# Patient Record
Sex: FEMALE | Race: WHITE | NOT HISPANIC OR LATINO | Employment: OTHER | URBAN - METROPOLITAN AREA
[De-identification: names, ages, dates, MRNs, and addresses within clinical notes are randomized per-mention and may not be internally consistent; named-entity substitution may affect disease eponyms.]

---

## 2017-02-15 ENCOUNTER — OFFICE VISIT - HEALTHEAST (OUTPATIENT)
Dept: FAMILY MEDICINE | Facility: CLINIC | Age: 62
End: 2017-02-15

## 2017-02-15 DIAGNOSIS — R05.9 COUGH: ICD-10-CM

## 2017-02-15 DIAGNOSIS — J01.90 ACUTE SINUSITIS: ICD-10-CM

## 2017-02-23 ENCOUNTER — COMMUNICATION - HEALTHEAST (OUTPATIENT)
Dept: FAMILY MEDICINE | Facility: CLINIC | Age: 62
End: 2017-02-23

## 2017-06-30 ENCOUNTER — OFFICE VISIT - HEALTHEAST (OUTPATIENT)
Dept: FAMILY MEDICINE | Facility: CLINIC | Age: 62
End: 2017-06-30

## 2017-06-30 DIAGNOSIS — H61.23 BILATERAL IMPACTED CERUMEN: ICD-10-CM

## 2017-06-30 DIAGNOSIS — E03.9 HYPOTHYROIDISM, UNSPECIFIED TYPE: ICD-10-CM

## 2017-06-30 DIAGNOSIS — E66.9 OBESITY, UNSPECIFIED: ICD-10-CM

## 2017-06-30 DIAGNOSIS — Z00.00 ROUTINE GENERAL MEDICAL EXAMINATION AT A HEALTH CARE FACILITY: ICD-10-CM

## 2017-06-30 DIAGNOSIS — E11.9 DIABETES MELLITUS (H): ICD-10-CM

## 2017-06-30 DIAGNOSIS — M67.40 GANGLION CYST: ICD-10-CM

## 2017-06-30 DIAGNOSIS — R60.9 EDEMA, UNSPECIFIED TYPE: ICD-10-CM

## 2017-06-30 DIAGNOSIS — Z79.899 MEDICATION MANAGEMENT: ICD-10-CM

## 2017-06-30 ASSESSMENT — MIFFLIN-ST. JEOR: SCORE: 1509.75

## 2017-07-12 ENCOUNTER — AMBULATORY - HEALTHEAST (OUTPATIENT)
Dept: LAB | Facility: CLINIC | Age: 62
End: 2017-07-12

## 2017-07-12 DIAGNOSIS — Z00.00 ROUTINE GENERAL MEDICAL EXAMINATION AT A HEALTH CARE FACILITY: ICD-10-CM

## 2017-07-12 DIAGNOSIS — E11.9 DIABETES MELLITUS (H): ICD-10-CM

## 2017-07-12 DIAGNOSIS — Z79.899 MEDICATION MANAGEMENT: ICD-10-CM

## 2017-07-12 DIAGNOSIS — E03.9 HYPOTHYROIDISM, UNSPECIFIED TYPE: ICD-10-CM

## 2017-07-12 LAB
CHOLEST SERPL-MCNC: 149 MG/DL
FASTING STATUS PATIENT QL REPORTED: YES
HBA1C MFR BLD: 8 % (ref 3.5–6)
HDLC SERPL-MCNC: 39 MG/DL
LDLC SERPL CALC-MCNC: 79 MG/DL
TRIGL SERPL-MCNC: 156 MG/DL

## 2017-07-13 ENCOUNTER — AMBULATORY - HEALTHEAST (OUTPATIENT)
Dept: FAMILY MEDICINE | Facility: CLINIC | Age: 62
End: 2017-07-13

## 2017-08-03 ENCOUNTER — COMMUNICATION - HEALTHEAST (OUTPATIENT)
Dept: FAMILY MEDICINE | Facility: CLINIC | Age: 62
End: 2017-08-03

## 2017-08-03 DIAGNOSIS — E11.9 TYPE 2 DIABETES MELLITUS WITHOUT COMPLICATION (H): ICD-10-CM

## 2017-08-16 ENCOUNTER — COMMUNICATION - HEALTHEAST (OUTPATIENT)
Dept: FAMILY MEDICINE | Facility: CLINIC | Age: 62
End: 2017-08-16

## 2017-08-16 DIAGNOSIS — E03.9 HYPOTHYROIDISM, UNSPECIFIED TYPE: ICD-10-CM

## 2017-09-01 ENCOUNTER — COMMUNICATION - HEALTHEAST (OUTPATIENT)
Dept: FAMILY MEDICINE | Facility: CLINIC | Age: 62
End: 2017-09-01

## 2017-09-01 DIAGNOSIS — R60.9 EDEMA, UNSPECIFIED TYPE: ICD-10-CM

## 2017-10-05 ENCOUNTER — COMMUNICATION - HEALTHEAST (OUTPATIENT)
Dept: FAMILY MEDICINE | Facility: CLINIC | Age: 62
End: 2017-10-05

## 2017-10-05 DIAGNOSIS — R60.9 EDEMA, UNSPECIFIED TYPE: ICD-10-CM

## 2018-01-28 ENCOUNTER — COMMUNICATION - HEALTHEAST (OUTPATIENT)
Dept: FAMILY MEDICINE | Facility: CLINIC | Age: 63
End: 2018-01-28

## 2018-01-28 DIAGNOSIS — R60.9 EDEMA, UNSPECIFIED TYPE: ICD-10-CM

## 2018-03-11 ENCOUNTER — OFFICE VISIT - HEALTHEAST (OUTPATIENT)
Dept: FAMILY MEDICINE | Facility: CLINIC | Age: 63
End: 2018-03-11

## 2018-03-11 DIAGNOSIS — J40 BRONCHITIS: ICD-10-CM

## 2018-03-20 ENCOUNTER — OFFICE VISIT - HEALTHEAST (OUTPATIENT)
Dept: FAMILY MEDICINE | Facility: CLINIC | Age: 63
End: 2018-03-20

## 2018-03-20 ENCOUNTER — COMMUNICATION - HEALTHEAST (OUTPATIENT)
Dept: FAMILY MEDICINE | Facility: CLINIC | Age: 63
End: 2018-03-20

## 2018-03-20 DIAGNOSIS — R05.9 COUGH: ICD-10-CM

## 2018-03-20 DIAGNOSIS — J40 BRONCHITIS: ICD-10-CM

## 2018-03-20 LAB
FLUAV AG SPEC QL IA: NORMAL
FLUBV AG SPEC QL IA: NORMAL

## 2018-04-09 ENCOUNTER — COMMUNICATION - HEALTHEAST (OUTPATIENT)
Dept: FAMILY MEDICINE | Facility: CLINIC | Age: 63
End: 2018-04-09

## 2018-06-28 ENCOUNTER — RECORDS - HEALTHEAST (OUTPATIENT)
Dept: ADMINISTRATIVE | Facility: OTHER | Age: 63
End: 2018-06-28

## 2018-07-10 ENCOUNTER — RECORDS - HEALTHEAST (OUTPATIENT)
Dept: ADMINISTRATIVE | Facility: OTHER | Age: 63
End: 2018-07-10

## 2018-08-01 ENCOUNTER — OFFICE VISIT - HEALTHEAST (OUTPATIENT)
Dept: PODIATRY | Facility: CLINIC | Age: 63
End: 2018-08-01

## 2018-08-01 DIAGNOSIS — E11.9 TYPE 2 DIABETES MELLITUS WITHOUT COMPLICATION, WITHOUT LONG-TERM CURRENT USE OF INSULIN (H): ICD-10-CM

## 2018-08-01 DIAGNOSIS — L60.2 ONYCHAUXIS: ICD-10-CM

## 2018-08-01 DIAGNOSIS — B35.1 NAIL FUNGUS: ICD-10-CM

## 2018-09-17 ENCOUNTER — OFFICE VISIT - HEALTHEAST (OUTPATIENT)
Dept: FAMILY MEDICINE | Facility: CLINIC | Age: 63
End: 2018-09-17

## 2018-09-17 DIAGNOSIS — Z79.899 MEDICATION MANAGEMENT: ICD-10-CM

## 2018-09-17 DIAGNOSIS — E11.9 DIABETES MELLITUS, TYPE 2 (H): ICD-10-CM

## 2018-09-17 DIAGNOSIS — E03.9 HYPOTHYROIDISM, UNSPECIFIED TYPE: ICD-10-CM

## 2018-09-17 DIAGNOSIS — E78.2 MIXED HYPERLIPIDEMIA: ICD-10-CM

## 2018-09-17 DIAGNOSIS — Z12.11 SCREEN FOR COLON CANCER: ICD-10-CM

## 2018-09-17 DIAGNOSIS — R60.9 EDEMA, UNSPECIFIED TYPE: ICD-10-CM

## 2018-09-17 DIAGNOSIS — M79.604 BILATERAL LEG PAIN: ICD-10-CM

## 2018-09-17 DIAGNOSIS — E66.01 MORBID OBESITY (H): ICD-10-CM

## 2018-09-17 DIAGNOSIS — M79.605 BILATERAL LEG PAIN: ICD-10-CM

## 2018-09-17 DIAGNOSIS — Z12.31 VISIT FOR SCREENING MAMMOGRAM: ICD-10-CM

## 2018-09-17 DIAGNOSIS — Z00.00 HEALTH CARE MAINTENANCE: ICD-10-CM

## 2018-09-17 DIAGNOSIS — I83.93 VARICOSE VEINS OF BOTH LOWER EXTREMITIES: ICD-10-CM

## 2018-09-17 LAB
ALBUMIN SERPL-MCNC: 3.7 G/DL (ref 3.5–5)
ALP SERPL-CCNC: 64 U/L (ref 45–120)
ALT SERPL W P-5'-P-CCNC: 36 U/L (ref 0–45)
ANION GAP SERPL CALCULATED.3IONS-SCNC: 7 MMOL/L (ref 5–18)
AST SERPL W P-5'-P-CCNC: 21 U/L (ref 0–40)
BILIRUB SERPL-MCNC: 0.5 MG/DL (ref 0–1)
BUN SERPL-MCNC: 16 MG/DL (ref 8–22)
CALCIUM SERPL-MCNC: 9.4 MG/DL (ref 8.5–10.5)
CHLORIDE BLD-SCNC: 104 MMOL/L (ref 98–107)
CHOLEST SERPL-MCNC: 129 MG/DL
CO2 SERPL-SCNC: 29 MMOL/L (ref 22–31)
CREAT SERPL-MCNC: 0.72 MG/DL (ref 0.6–1.1)
CREAT UR-MCNC: 159.8 MG/DL
FASTING STATUS PATIENT QL REPORTED: NO
GFR SERPL CREATININE-BSD FRML MDRD: >60 ML/MIN/1.73M2
GLUCOSE BLD-MCNC: 160 MG/DL (ref 70–125)
HBA1C MFR BLD: 8.4 % (ref 3.5–6)
HDLC SERPL-MCNC: 38 MG/DL
LDLC SERPL CALC-MCNC: 55 MG/DL
MAGNESIUM SERPL-MCNC: 2 MG/DL (ref 1.8–2.6)
MICROALBUMIN UR-MCNC: 1.58 MG/DL (ref 0–1.99)
MICROALBUMIN/CREAT UR: 9.9 MG/G
POTASSIUM BLD-SCNC: 4.5 MMOL/L (ref 3.5–5)
PROT SERPL-MCNC: 6.4 G/DL (ref 6–8)
SODIUM SERPL-SCNC: 140 MMOL/L (ref 136–145)
TRIGL SERPL-MCNC: 181 MG/DL
TSH SERPL DL<=0.005 MIU/L-ACNC: 0.42 UIU/ML (ref 0.3–5)

## 2018-09-17 ASSESSMENT — MIFFLIN-ST. JEOR: SCORE: 1590.95

## 2018-09-19 ENCOUNTER — COMMUNICATION - HEALTHEAST (OUTPATIENT)
Dept: FAMILY MEDICINE | Facility: CLINIC | Age: 63
End: 2018-09-19

## 2018-09-20 ENCOUNTER — COMMUNICATION - HEALTHEAST (OUTPATIENT)
Dept: FAMILY MEDICINE | Facility: CLINIC | Age: 63
End: 2018-09-20

## 2018-09-20 DIAGNOSIS — E11.9 TYPE 2 DIABETES MELLITUS WITHOUT COMPLICATION (H): ICD-10-CM

## 2018-09-21 ENCOUNTER — COMMUNICATION - HEALTHEAST (OUTPATIENT)
Dept: FAMILY MEDICINE | Facility: CLINIC | Age: 63
End: 2018-09-21

## 2018-09-21 DIAGNOSIS — E11.9 TYPE 2 DIABETES MELLITUS WITHOUT COMPLICATION (H): ICD-10-CM

## 2018-09-25 ENCOUNTER — COMMUNICATION - HEALTHEAST (OUTPATIENT)
Dept: FAMILY MEDICINE | Facility: CLINIC | Age: 63
End: 2018-09-25

## 2018-09-27 ENCOUNTER — COMMUNICATION - HEALTHEAST (OUTPATIENT)
Dept: FAMILY MEDICINE | Facility: CLINIC | Age: 63
End: 2018-09-27

## 2018-09-27 DIAGNOSIS — E11.9 TYPE 2 DIABETES MELLITUS WITHOUT COMPLICATION, WITHOUT LONG-TERM CURRENT USE OF INSULIN (H): ICD-10-CM

## 2018-10-01 ENCOUNTER — COMMUNICATION - HEALTHEAST (OUTPATIENT)
Dept: VASCULAR SURGERY | Facility: CLINIC | Age: 63
End: 2018-10-01

## 2018-10-09 ENCOUNTER — HOSPITAL ENCOUNTER (OUTPATIENT)
Dept: MAMMOGRAPHY | Facility: CLINIC | Age: 63
Discharge: HOME | End: 2018-10-09
Attending: NURSE PRACTITIONER

## 2018-10-09 DIAGNOSIS — Z12.31 VISIT FOR SCREENING MAMMOGRAM: ICD-10-CM

## 2018-10-16 ENCOUNTER — RECORDS - HEALTHEAST (OUTPATIENT)
Dept: ADMINISTRATIVE | Facility: OTHER | Age: 63
End: 2018-10-16

## 2018-10-30 ENCOUNTER — AMBULATORY - HEALTHEAST (OUTPATIENT)
Dept: NURSING | Facility: CLINIC | Age: 63
End: 2018-10-30

## 2018-10-30 DIAGNOSIS — Z00.00 HEALTHCARE MAINTENANCE: ICD-10-CM

## 2018-11-15 ENCOUNTER — OFFICE VISIT - HEALTHEAST (OUTPATIENT)
Dept: FAMILY MEDICINE | Facility: CLINIC | Age: 63
End: 2018-11-15

## 2018-11-15 DIAGNOSIS — E66.01 MORBID OBESITY (H): ICD-10-CM

## 2018-11-15 DIAGNOSIS — I10 BENIGN ESSENTIAL HYPERTENSION: ICD-10-CM

## 2018-11-15 DIAGNOSIS — E11.9 TYPE 2 DIABETES MELLITUS WITHOUT COMPLICATION, WITHOUT LONG-TERM CURRENT USE OF INSULIN (H): ICD-10-CM

## 2018-11-15 DIAGNOSIS — E03.9 HYPOTHYROIDISM, UNSPECIFIED TYPE: ICD-10-CM

## 2018-11-15 ASSESSMENT — MIFFLIN-ST. JEOR: SCORE: 1606.82

## 2018-12-30 ENCOUNTER — COMMUNICATION - HEALTHEAST (OUTPATIENT)
Dept: FAMILY MEDICINE | Facility: CLINIC | Age: 63
End: 2018-12-30

## 2018-12-30 DIAGNOSIS — E11.9 DIABETES MELLITUS, TYPE 2 (H): ICD-10-CM

## 2019-01-02 ENCOUNTER — RECORDS - HEALTHEAST (OUTPATIENT)
Dept: VASCULAR ULTRASOUND | Facility: CLINIC | Age: 64
End: 2019-01-02

## 2019-01-02 ENCOUNTER — OFFICE VISIT - HEALTHEAST (OUTPATIENT)
Dept: VASCULAR SURGERY | Facility: CLINIC | Age: 64
End: 2019-01-02

## 2019-01-02 DIAGNOSIS — I83.893 VARICOSE VEINS OF BILATERAL LOWER EXTREMITIES WITH OTHER COMPLICATIONS: ICD-10-CM

## 2019-01-02 DIAGNOSIS — I83.893 SYMPTOMATIC VARICOSE VEINS OF BOTH LOWER EXTREMITIES: ICD-10-CM

## 2019-01-02 DIAGNOSIS — I87.2 VENOUS INSUFFICIENCY (CHRONIC) (PERIPHERAL): ICD-10-CM

## 2019-01-02 DIAGNOSIS — I87.2 VENOUS INSUFFICIENCY OF BOTH LOWER EXTREMITIES: ICD-10-CM

## 2019-01-02 ASSESSMENT — MIFFLIN-ST. JEOR: SCORE: 1619.98

## 2019-01-10 ENCOUNTER — OFFICE VISIT - HEALTHEAST (OUTPATIENT)
Dept: FAMILY MEDICINE | Facility: CLINIC | Age: 64
End: 2019-01-10

## 2019-01-10 DIAGNOSIS — I10 BENIGN ESSENTIAL HYPERTENSION: ICD-10-CM

## 2019-01-10 DIAGNOSIS — B35.1 ONYCHOMYCOSIS: ICD-10-CM

## 2019-01-10 DIAGNOSIS — E66.01 MORBID OBESITY (H): ICD-10-CM

## 2019-01-10 DIAGNOSIS — E03.9 HYPOTHYROIDISM, UNSPECIFIED TYPE: ICD-10-CM

## 2019-01-10 DIAGNOSIS — E11.9 TYPE 2 DIABETES MELLITUS WITHOUT COMPLICATION, WITHOUT LONG-TERM CURRENT USE OF INSULIN (H): ICD-10-CM

## 2019-01-10 LAB — HBA1C MFR BLD: 7.7 % (ref 3.5–6)

## 2019-01-10 ASSESSMENT — MIFFLIN-ST. JEOR: SCORE: 1618.62

## 2019-01-14 ENCOUNTER — AMBULATORY - HEALTHEAST (OUTPATIENT)
Dept: FAMILY MEDICINE | Facility: CLINIC | Age: 64
End: 2019-01-14

## 2019-01-15 ENCOUNTER — COMMUNICATION - HEALTHEAST (OUTPATIENT)
Dept: FAMILY MEDICINE | Facility: CLINIC | Age: 64
End: 2019-01-15

## 2019-01-18 ENCOUNTER — COMMUNICATION - HEALTHEAST (OUTPATIENT)
Dept: FAMILY MEDICINE | Facility: CLINIC | Age: 64
End: 2019-01-18

## 2019-02-07 ENCOUNTER — COMMUNICATION - HEALTHEAST (OUTPATIENT)
Dept: FAMILY MEDICINE | Facility: CLINIC | Age: 64
End: 2019-02-07

## 2019-02-07 DIAGNOSIS — E03.9 HYPOTHYROIDISM, UNSPECIFIED TYPE: ICD-10-CM

## 2019-03-04 ENCOUNTER — COMMUNICATION - HEALTHEAST (OUTPATIENT)
Dept: FAMILY MEDICINE | Facility: CLINIC | Age: 64
End: 2019-03-04

## 2019-03-04 DIAGNOSIS — R60.9 EDEMA, UNSPECIFIED TYPE: ICD-10-CM

## 2019-03-04 DIAGNOSIS — E11.9 TYPE 2 DIABETES MELLITUS WITHOUT COMPLICATION, WITHOUT LONG-TERM CURRENT USE OF INSULIN (H): ICD-10-CM

## 2019-03-04 DIAGNOSIS — E78.2 MIXED HYPERLIPIDEMIA: ICD-10-CM

## 2019-03-04 DIAGNOSIS — B35.1 ONYCHOMYCOSIS: ICD-10-CM

## 2019-03-04 DIAGNOSIS — E03.9 HYPOTHYROIDISM, UNSPECIFIED TYPE: ICD-10-CM

## 2019-03-04 DIAGNOSIS — E11.9 DIABETES MELLITUS, TYPE 2 (H): ICD-10-CM

## 2019-03-04 DIAGNOSIS — E11.9 TYPE 2 DIABETES MELLITUS WITHOUT COMPLICATION (H): ICD-10-CM

## 2019-03-10 ENCOUNTER — COMMUNICATION - HEALTHEAST (OUTPATIENT)
Dept: FAMILY MEDICINE | Facility: CLINIC | Age: 64
End: 2019-03-10

## 2019-03-12 ENCOUNTER — COMMUNICATION - HEALTHEAST (OUTPATIENT)
Dept: FAMILY MEDICINE | Facility: CLINIC | Age: 64
End: 2019-03-12

## 2019-03-12 DIAGNOSIS — E11.65 TYPE 2 DIABETES MELLITUS WITH HYPERGLYCEMIA, WITHOUT LONG-TERM CURRENT USE OF INSULIN (H): ICD-10-CM

## 2019-04-11 ENCOUNTER — OFFICE VISIT - HEALTHEAST (OUTPATIENT)
Dept: FAMILY MEDICINE | Facility: CLINIC | Age: 64
End: 2019-04-11

## 2019-04-11 DIAGNOSIS — E03.9 HYPOTHYROIDISM, UNSPECIFIED TYPE: ICD-10-CM

## 2019-04-11 DIAGNOSIS — I10 BENIGN ESSENTIAL HYPERTENSION: ICD-10-CM

## 2019-04-11 DIAGNOSIS — E66.01 CLASS 2 SEVERE OBESITY DUE TO EXCESS CALORIES WITH SERIOUS COMORBIDITY AND BODY MASS INDEX (BMI) OF 39.0 TO 39.9 IN ADULT (H): ICD-10-CM

## 2019-04-11 DIAGNOSIS — E11.9 TYPE 2 DIABETES MELLITUS WITHOUT COMPLICATION (H): ICD-10-CM

## 2019-04-11 DIAGNOSIS — E78.1 HYPERTRIGLYCERIDEMIA: ICD-10-CM

## 2019-04-11 DIAGNOSIS — N84.1 CERVICAL POLYP: ICD-10-CM

## 2019-04-11 DIAGNOSIS — E66.812 CLASS 2 SEVERE OBESITY DUE TO EXCESS CALORIES WITH SERIOUS COMORBIDITY AND BODY MASS INDEX (BMI) OF 39.0 TO 39.9 IN ADULT (H): ICD-10-CM

## 2019-04-11 DIAGNOSIS — Z00.00 ROUTINE GENERAL MEDICAL EXAMINATION AT A HEALTH CARE FACILITY: ICD-10-CM

## 2019-04-11 LAB — HBA1C MFR BLD: 7.3 % (ref 3.5–6)

## 2019-04-12 LAB
HPV SOURCE: NORMAL
HUMAN PAPILLOMA VIRUS 16 DNA: NEGATIVE
HUMAN PAPILLOMA VIRUS 18 DNA: NEGATIVE
HUMAN PAPILLOMA VIRUS FINAL DIAGNOSIS: NORMAL
HUMAN PAPILLOMA VIRUS OTHER HR: NEGATIVE
SPECIMEN DESCRIPTION: NORMAL

## 2019-04-16 LAB
BKR LAB AP ABNORMAL BLEEDING: NO
BKR LAB AP BIRTH CONTROL/HORMONES: NORMAL
BKR LAB AP CERVICAL APPEARANCE: NORMAL
BKR LAB AP GYN ADEQUACY: NORMAL
BKR LAB AP GYN INTERPRETATION: NORMAL
BKR LAB AP GYN OTHER FINDINGS: NORMAL
BKR LAB AP HPV REFLEX: NORMAL
BKR LAB AP LMP: NORMAL
BKR LAB AP PATIENT STATUS: NORMAL
BKR LAB AP PREVIOUS ABNORMAL: NORMAL
BKR LAB AP PREVIOUS NORMAL: NORMAL
HIGH RISK?: NO
PATH REPORT.COMMENTS IMP SPEC: NORMAL
RESULT FLAG (HE HISTORICAL CONVERSION): NORMAL

## 2019-04-17 ENCOUNTER — COMMUNICATION - HEALTHEAST (OUTPATIENT)
Dept: FAMILY MEDICINE | Facility: CLINIC | Age: 64
End: 2019-04-17

## 2019-04-17 ENCOUNTER — OFFICE VISIT - HEALTHEAST (OUTPATIENT)
Dept: VASCULAR SURGERY | Facility: CLINIC | Age: 64
End: 2019-04-17

## 2019-04-17 DIAGNOSIS — I83.893 SYMPTOMATIC VARICOSE VEINS OF BOTH LOWER EXTREMITIES: ICD-10-CM

## 2019-04-17 DIAGNOSIS — I87.2 VENOUS INSUFFICIENCY OF BOTH LOWER EXTREMITIES: ICD-10-CM

## 2019-04-17 ASSESSMENT — MIFFLIN-ST. JEOR: SCORE: 1619.98

## 2019-04-23 ENCOUNTER — RECORDS - HEALTHEAST (OUTPATIENT)
Dept: ADMINISTRATIVE | Facility: OTHER | Age: 64
End: 2019-04-23

## 2019-04-27 ENCOUNTER — COMMUNICATION - HEALTHEAST (OUTPATIENT)
Dept: FAMILY MEDICINE | Facility: CLINIC | Age: 64
End: 2019-04-27

## 2019-04-27 DIAGNOSIS — E11.9 DIABETES MELLITUS, TYPE 2 (H): ICD-10-CM

## 2019-04-27 DIAGNOSIS — R60.9 EDEMA, UNSPECIFIED TYPE: ICD-10-CM

## 2019-04-27 DIAGNOSIS — E03.9 HYPOTHYROIDISM, UNSPECIFIED TYPE: ICD-10-CM

## 2019-04-27 DIAGNOSIS — E11.9 TYPE 2 DIABETES MELLITUS WITHOUT COMPLICATION (H): ICD-10-CM

## 2019-09-27 ENCOUNTER — COMMUNICATION - HEALTHEAST (OUTPATIENT)
Dept: FAMILY MEDICINE | Facility: CLINIC | Age: 64
End: 2019-09-27

## 2019-09-27 DIAGNOSIS — E11.9 TYPE 2 DIABETES MELLITUS WITHOUT COMPLICATION (H): ICD-10-CM

## 2019-09-28 ENCOUNTER — COMMUNICATION - HEALTHEAST (OUTPATIENT)
Dept: FAMILY MEDICINE | Facility: CLINIC | Age: 64
End: 2019-09-28

## 2019-09-28 DIAGNOSIS — E78.2 MIXED HYPERLIPIDEMIA: ICD-10-CM

## 2019-10-04 ENCOUNTER — COMMUNICATION - HEALTHEAST (OUTPATIENT)
Dept: FAMILY MEDICINE | Facility: CLINIC | Age: 64
End: 2019-10-04

## 2019-10-04 DIAGNOSIS — R60.9 EDEMA, UNSPECIFIED TYPE: ICD-10-CM

## 2019-10-04 DIAGNOSIS — E03.9 HYPOTHYROIDISM, UNSPECIFIED TYPE: ICD-10-CM

## 2019-10-04 DIAGNOSIS — E11.9 DIABETES MELLITUS, TYPE 2 (H): ICD-10-CM

## 2019-10-07 ENCOUNTER — COMMUNICATION - HEALTHEAST (OUTPATIENT)
Dept: FAMILY MEDICINE | Facility: CLINIC | Age: 64
End: 2019-10-07

## 2019-10-25 ENCOUNTER — OFFICE VISIT - HEALTHEAST (OUTPATIENT)
Dept: FAMILY MEDICINE | Facility: CLINIC | Age: 64
End: 2019-10-25

## 2019-10-25 DIAGNOSIS — E66.01 CLASS 2 SEVERE OBESITY DUE TO EXCESS CALORIES WITH SERIOUS COMORBIDITY AND BODY MASS INDEX (BMI) OF 37.0 TO 37.9 IN ADULT (H): ICD-10-CM

## 2019-10-25 DIAGNOSIS — E03.9 HYPOTHYROIDISM, UNSPECIFIED TYPE: ICD-10-CM

## 2019-10-25 DIAGNOSIS — Z79.899 MEDICATION MANAGEMENT: ICD-10-CM

## 2019-10-25 DIAGNOSIS — E78.1 HYPERTRIGLYCERIDEMIA: ICD-10-CM

## 2019-10-25 DIAGNOSIS — E11.9 TYPE 2 DIABETES MELLITUS WITHOUT COMPLICATION, WITHOUT LONG-TERM CURRENT USE OF INSULIN (H): ICD-10-CM

## 2019-10-25 DIAGNOSIS — I10 BENIGN ESSENTIAL HYPERTENSION: ICD-10-CM

## 2019-10-25 DIAGNOSIS — E66.812 CLASS 2 SEVERE OBESITY DUE TO EXCESS CALORIES WITH SERIOUS COMORBIDITY AND BODY MASS INDEX (BMI) OF 37.0 TO 37.9 IN ADULT (H): ICD-10-CM

## 2019-10-25 DIAGNOSIS — J06.9 VIRAL URI: ICD-10-CM

## 2019-10-25 DIAGNOSIS — Z13.820 SCREENING FOR OSTEOPOROSIS: ICD-10-CM

## 2019-10-25 DIAGNOSIS — H93.8X3 SENSATION OF FULLNESS IN BOTH EARS: ICD-10-CM

## 2019-10-25 LAB
ALBUMIN SERPL-MCNC: 3.9 G/DL (ref 3.5–5)
ALP SERPL-CCNC: 70 U/L (ref 45–120)
ALT SERPL W P-5'-P-CCNC: 37 U/L (ref 0–45)
ANION GAP SERPL CALCULATED.3IONS-SCNC: 10 MMOL/L (ref 5–18)
AST SERPL W P-5'-P-CCNC: 25 U/L (ref 0–40)
BILIRUB SERPL-MCNC: 0.5 MG/DL (ref 0–1)
BUN SERPL-MCNC: 12 MG/DL (ref 8–22)
CALCIUM SERPL-MCNC: 9.6 MG/DL (ref 8.5–10.5)
CHLORIDE BLD-SCNC: 102 MMOL/L (ref 98–107)
CO2 SERPL-SCNC: 30 MMOL/L (ref 22–31)
CREAT SERPL-MCNC: 0.82 MG/DL (ref 0.6–1.1)
CREAT UR-MCNC: 173.3 MG/DL
GFR SERPL CREATININE-BSD FRML MDRD: >60 ML/MIN/1.73M2
GLUCOSE BLD-MCNC: 176 MG/DL (ref 70–125)
HBA1C MFR BLD: 7.4 % (ref 3.5–6)
MICROALBUMIN UR-MCNC: 1.4 MG/DL (ref 0–1.99)
MICROALBUMIN/CREAT UR: 8.1 MG/G
POTASSIUM BLD-SCNC: 4.3 MMOL/L (ref 3.5–5)
PROT SERPL-MCNC: 6.6 G/DL (ref 6–8)
SODIUM SERPL-SCNC: 142 MMOL/L (ref 136–145)
TSH SERPL DL<=0.005 MIU/L-ACNC: 2.02 UIU/ML (ref 0.3–5)

## 2019-10-25 ASSESSMENT — MIFFLIN-ST. JEOR: SCORE: 1555.11

## 2019-12-03 ENCOUNTER — COMMUNICATION - HEALTHEAST (OUTPATIENT)
Dept: FAMILY MEDICINE | Facility: CLINIC | Age: 64
End: 2019-12-03

## 2019-12-03 DIAGNOSIS — R60.9 EDEMA, UNSPECIFIED TYPE: ICD-10-CM

## 2019-12-03 DIAGNOSIS — E11.9 DIABETES MELLITUS, TYPE 2 (H): ICD-10-CM

## 2019-12-03 DIAGNOSIS — E03.9 HYPOTHYROIDISM, UNSPECIFIED TYPE: ICD-10-CM

## 2020-05-08 ENCOUNTER — COMMUNICATION - HEALTHEAST (OUTPATIENT)
Dept: FAMILY MEDICINE | Facility: CLINIC | Age: 65
End: 2020-05-08

## 2020-05-08 DIAGNOSIS — E78.2 MIXED HYPERLIPIDEMIA: ICD-10-CM

## 2020-09-11 ENCOUNTER — COMMUNICATION - HEALTHEAST (OUTPATIENT)
Dept: FAMILY MEDICINE | Facility: CLINIC | Age: 65
End: 2020-09-11

## 2020-09-14 ENCOUNTER — AMBULATORY - HEALTHEAST (OUTPATIENT)
Dept: FAMILY MEDICINE | Facility: CLINIC | Age: 65
End: 2020-09-14

## 2020-09-14 DIAGNOSIS — E03.9 HYPOTHYROIDISM, UNSPECIFIED TYPE: ICD-10-CM

## 2020-09-14 DIAGNOSIS — E11.9 TYPE 2 DIABETES MELLITUS WITHOUT COMPLICATION, WITHOUT LONG-TERM CURRENT USE OF INSULIN (H): ICD-10-CM

## 2020-09-14 DIAGNOSIS — Z79.899 MEDICATION MANAGEMENT: ICD-10-CM

## 2020-10-05 ENCOUNTER — RECORDS - HEALTHEAST (OUTPATIENT)
Dept: ADMINISTRATIVE | Facility: OTHER | Age: 65
End: 2020-10-05

## 2020-10-21 ENCOUNTER — COMMUNICATION - HEALTHEAST (OUTPATIENT)
Dept: FAMILY MEDICINE | Facility: CLINIC | Age: 65
End: 2020-10-21

## 2020-10-21 DIAGNOSIS — E78.2 MIXED HYPERLIPIDEMIA: ICD-10-CM

## 2020-10-22 ENCOUNTER — COMMUNICATION - HEALTHEAST (OUTPATIENT)
Dept: FAMILY MEDICINE | Facility: CLINIC | Age: 65
End: 2020-10-22

## 2020-10-22 DIAGNOSIS — E03.9 HYPOTHYROIDISM, UNSPECIFIED TYPE: ICD-10-CM

## 2020-10-22 DIAGNOSIS — R60.9 EDEMA, UNSPECIFIED TYPE: ICD-10-CM

## 2020-10-22 DIAGNOSIS — E11.9 DIABETES MELLITUS, TYPE 2 (H): ICD-10-CM

## 2020-11-03 ENCOUNTER — AMBULATORY - HEALTHEAST (OUTPATIENT)
Dept: LAB | Facility: CLINIC | Age: 65
End: 2020-11-03

## 2020-11-03 DIAGNOSIS — E03.9 HYPOTHYROIDISM, UNSPECIFIED TYPE: ICD-10-CM

## 2020-11-03 DIAGNOSIS — E11.9 TYPE 2 DIABETES MELLITUS WITHOUT COMPLICATION, WITHOUT LONG-TERM CURRENT USE OF INSULIN (H): ICD-10-CM

## 2020-11-03 DIAGNOSIS — Z79.899 MEDICATION MANAGEMENT: ICD-10-CM

## 2020-11-03 LAB
ALBUMIN SERPL-MCNC: 3.9 G/DL (ref 3.5–5)
ALP SERPL-CCNC: 66 U/L (ref 45–120)
ALT SERPL W P-5'-P-CCNC: 35 U/L (ref 0–45)
ANION GAP SERPL CALCULATED.3IONS-SCNC: 9 MMOL/L (ref 5–18)
AST SERPL W P-5'-P-CCNC: 21 U/L (ref 0–40)
BILIRUB SERPL-MCNC: 0.5 MG/DL (ref 0–1)
BUN SERPL-MCNC: 16 MG/DL (ref 8–22)
CALCIUM SERPL-MCNC: 9.8 MG/DL (ref 8.5–10.5)
CHLORIDE BLD-SCNC: 103 MMOL/L (ref 98–107)
CO2 SERPL-SCNC: 29 MMOL/L (ref 22–31)
CREAT SERPL-MCNC: 0.79 MG/DL (ref 0.6–1.1)
CREAT UR-MCNC: 175.5 MG/DL
GFR SERPL CREATININE-BSD FRML MDRD: >60 ML/MIN/1.73M2
GLUCOSE BLD-MCNC: 152 MG/DL (ref 70–125)
HBA1C MFR BLD: 7.6 %
MICROALBUMIN UR-MCNC: 1.84 MG/DL (ref 0–1.99)
MICROALBUMIN/CREAT UR: 10.5 MG/G
POTASSIUM BLD-SCNC: 4.7 MMOL/L (ref 3.5–5)
PROT SERPL-MCNC: 6.5 G/DL (ref 6–8)
SODIUM SERPL-SCNC: 141 MMOL/L (ref 136–145)
T4 FREE SERPL-MCNC: 1.1 NG/DL (ref 0.7–1.8)
TSH SERPL DL<=0.005 MIU/L-ACNC: 0.15 UIU/ML (ref 0.3–5)

## 2020-11-06 ENCOUNTER — OFFICE VISIT - HEALTHEAST (OUTPATIENT)
Dept: FAMILY MEDICINE | Facility: CLINIC | Age: 65
End: 2020-11-06

## 2020-11-06 ENCOUNTER — COMMUNICATION - HEALTHEAST (OUTPATIENT)
Dept: FAMILY MEDICINE | Facility: CLINIC | Age: 65
End: 2020-11-06

## 2020-11-06 DIAGNOSIS — R60.9 EDEMA, UNSPECIFIED TYPE: ICD-10-CM

## 2020-11-06 DIAGNOSIS — H61.22 IMPACTED CERUMEN OF LEFT EAR: ICD-10-CM

## 2020-11-06 DIAGNOSIS — E78.1 HYPERTRIGLYCERIDEMIA: ICD-10-CM

## 2020-11-06 DIAGNOSIS — E03.9 HYPOTHYROIDISM, UNSPECIFIED TYPE: ICD-10-CM

## 2020-11-06 DIAGNOSIS — E66.01 CLASS 2 SEVERE OBESITY DUE TO EXCESS CALORIES WITH SERIOUS COMORBIDITY AND BODY MASS INDEX (BMI) OF 37.0 TO 37.9 IN ADULT (H): ICD-10-CM

## 2020-11-06 DIAGNOSIS — E55.9 VITAMIN D DEFICIENCY: ICD-10-CM

## 2020-11-06 DIAGNOSIS — E11.9 DIABETES MELLITUS, TYPE 2 (H): ICD-10-CM

## 2020-11-06 DIAGNOSIS — J32.9 CHRONIC CONGESTION OF PARANASAL SINUS: ICD-10-CM

## 2020-11-06 DIAGNOSIS — Z00.00 HEALTH CARE MAINTENANCE: ICD-10-CM

## 2020-11-06 DIAGNOSIS — E78.2 MIXED HYPERLIPIDEMIA: ICD-10-CM

## 2020-11-06 DIAGNOSIS — E11.9 TYPE 2 DIABETES MELLITUS WITHOUT COMPLICATION, WITHOUT LONG-TERM CURRENT USE OF INSULIN (H): ICD-10-CM

## 2020-11-06 DIAGNOSIS — E66.812 CLASS 2 SEVERE OBESITY DUE TO EXCESS CALORIES WITH SERIOUS COMORBIDITY AND BODY MASS INDEX (BMI) OF 37.0 TO 37.9 IN ADULT (H): ICD-10-CM

## 2020-11-06 DIAGNOSIS — H93.12 TINNITUS, LEFT: ICD-10-CM

## 2020-11-06 ASSESSMENT — MIFFLIN-ST. JEOR: SCORE: 1568.34

## 2020-11-10 ENCOUNTER — COMMUNICATION - HEALTHEAST (OUTPATIENT)
Dept: PHARMACY | Facility: CLINIC | Age: 65
End: 2020-11-10

## 2020-11-18 ENCOUNTER — OFFICE VISIT - HEALTHEAST (OUTPATIENT)
Dept: PHARMACY | Facility: CLINIC | Age: 65
End: 2020-11-18

## 2020-11-18 ENCOUNTER — COMMUNICATION - HEALTHEAST (OUTPATIENT)
Dept: PHARMACY | Facility: CLINIC | Age: 65
End: 2020-11-18

## 2020-11-18 DIAGNOSIS — E11.9 TYPE 2 DIABETES MELLITUS WITHOUT COMPLICATION, WITHOUT LONG-TERM CURRENT USE OF INSULIN (H): ICD-10-CM

## 2020-11-18 DIAGNOSIS — E03.9 HYPOTHYROIDISM, UNSPECIFIED TYPE: ICD-10-CM

## 2020-11-18 DIAGNOSIS — E55.9 VITAMIN D DEFICIENCY: ICD-10-CM

## 2020-11-18 DIAGNOSIS — I10 BENIGN ESSENTIAL HYPERTENSION: ICD-10-CM

## 2020-11-18 PROCEDURE — 99607 MTMS BY PHARM ADDL 15 MIN: CPT | Performed by: PHARMACIST

## 2020-11-18 PROCEDURE — 99605 MTMS BY PHARM NP 15 MIN: CPT | Performed by: PHARMACIST

## 2020-12-08 ENCOUNTER — OFFICE VISIT - HEALTHEAST (OUTPATIENT)
Dept: OTOLARYNGOLOGY | Facility: CLINIC | Age: 65
End: 2020-12-08

## 2020-12-08 ENCOUNTER — OFFICE VISIT - HEALTHEAST (OUTPATIENT)
Dept: AUDIOLOGY | Facility: CLINIC | Age: 65
End: 2020-12-08

## 2020-12-08 DIAGNOSIS — Z92.89 HISTORY OF USE OF HEARING AID IN LEFT EAR: ICD-10-CM

## 2020-12-08 DIAGNOSIS — H90.A21 SENSORINEURAL HEARING LOSS (SNHL) OF RIGHT EAR WITH RESTRICTED HEARING OF LEFT EAR: ICD-10-CM

## 2020-12-08 DIAGNOSIS — H90.3 SNHL (SENSORY-NEURAL HEARING LOSS), ASYMMETRICAL: ICD-10-CM

## 2020-12-08 DIAGNOSIS — R09.82 POST-NASAL DRIP: ICD-10-CM

## 2020-12-08 DIAGNOSIS — J34.89 NASAL OBSTRUCTION: ICD-10-CM

## 2020-12-08 DIAGNOSIS — H93.12 TINNITUS, LEFT: ICD-10-CM

## 2020-12-08 DIAGNOSIS — H90.A32 MIXED CONDUCTIVE AND SENSORINEURAL HEARING LOSS OF LEFT EAR WITH RESTRICTED HEARING OF RIGHT EAR: ICD-10-CM

## 2020-12-08 RX ORDER — AZELASTINE 1 MG/ML
SPRAY, METERED NASAL
Qty: 30 ML | Refills: 12 | Status: SHIPPED | OUTPATIENT
Start: 2020-12-08 | End: 2022-06-23

## 2020-12-12 ENCOUNTER — COMMUNICATION - HEALTHEAST (OUTPATIENT)
Dept: NURSING | Facility: CLINIC | Age: 65
End: 2020-12-12

## 2020-12-24 ENCOUNTER — COMMUNICATION - HEALTHEAST (OUTPATIENT)
Dept: PHARMACY | Facility: CLINIC | Age: 65
End: 2020-12-24

## 2020-12-24 DIAGNOSIS — E11.9 TYPE 2 DIABETES MELLITUS WITHOUT COMPLICATION, WITHOUT LONG-TERM CURRENT USE OF INSULIN (H): ICD-10-CM

## 2020-12-28 ENCOUNTER — AMBULATORY - HEALTHEAST (OUTPATIENT)
Dept: LAB | Facility: CLINIC | Age: 65
End: 2020-12-28

## 2020-12-28 DIAGNOSIS — E78.1 HYPERTRIGLYCERIDEMIA: ICD-10-CM

## 2020-12-28 DIAGNOSIS — E55.9 VITAMIN D DEFICIENCY: ICD-10-CM

## 2020-12-28 DIAGNOSIS — E03.9 HYPOTHYROIDISM, UNSPECIFIED TYPE: ICD-10-CM

## 2020-12-28 LAB
CHOLEST SERPL-MCNC: 121 MG/DL
FASTING STATUS PATIENT QL REPORTED: YES
HDLC SERPL-MCNC: 41 MG/DL
LDLC SERPL CALC-MCNC: 48 MG/DL
TRIGL SERPL-MCNC: 160 MG/DL
TSH SERPL DL<=0.005 MIU/L-ACNC: 3.35 UIU/ML (ref 0.3–5)

## 2020-12-29 ENCOUNTER — COMMUNICATION - HEALTHEAST (OUTPATIENT)
Dept: ADMINISTRATIVE | Facility: CLINIC | Age: 65
End: 2020-12-29

## 2020-12-29 LAB
25(OH)D3 SERPL-MCNC: 49.2 NG/ML (ref 30–80)
25(OH)D3 SERPL-MCNC: 49.2 NG/ML (ref 30–80)

## 2021-02-08 ENCOUNTER — COMMUNICATION - HEALTHEAST (OUTPATIENT)
Dept: FAMILY MEDICINE | Facility: CLINIC | Age: 66
End: 2021-02-08

## 2021-02-08 ENCOUNTER — COMMUNICATION - HEALTHEAST (OUTPATIENT)
Dept: PHARMACY | Facility: CLINIC | Age: 66
End: 2021-02-08

## 2021-02-08 DIAGNOSIS — E11.9 TYPE 2 DIABETES MELLITUS WITHOUT COMPLICATION, WITHOUT LONG-TERM CURRENT USE OF INSULIN (H): ICD-10-CM

## 2021-02-26 ENCOUNTER — COMMUNICATION - HEALTHEAST (OUTPATIENT)
Dept: FAMILY MEDICINE | Facility: CLINIC | Age: 66
End: 2021-02-26

## 2021-02-26 DIAGNOSIS — E03.9 HYPOTHYROIDISM, UNSPECIFIED TYPE: ICD-10-CM

## 2021-03-04 ENCOUNTER — COMMUNICATION - HEALTHEAST (OUTPATIENT)
Dept: PHARMACY | Facility: CLINIC | Age: 66
End: 2021-03-04

## 2021-03-09 ENCOUNTER — AMBULATORY - HEALTHEAST (OUTPATIENT)
Dept: FAMILY MEDICINE | Facility: CLINIC | Age: 66
End: 2021-03-09

## 2021-03-09 ENCOUNTER — COMMUNICATION - HEALTHEAST (OUTPATIENT)
Dept: FAMILY MEDICINE | Facility: CLINIC | Age: 66
End: 2021-03-09

## 2021-03-09 DIAGNOSIS — E03.9 HYPOTHYROIDISM, UNSPECIFIED TYPE: ICD-10-CM

## 2021-03-29 ENCOUNTER — RECORDS - HEALTHEAST (OUTPATIENT)
Dept: ADMINISTRATIVE | Facility: OTHER | Age: 66
End: 2021-03-29

## 2021-04-01 ENCOUNTER — COMMUNICATION - HEALTHEAST (OUTPATIENT)
Dept: FAMILY MEDICINE | Facility: CLINIC | Age: 66
End: 2021-04-01

## 2021-04-24 ENCOUNTER — COMMUNICATION - HEALTHEAST (OUTPATIENT)
Dept: PHARMACY | Facility: CLINIC | Age: 66
End: 2021-04-24

## 2021-05-07 ENCOUNTER — COMMUNICATION - HEALTHEAST (OUTPATIENT)
Dept: FAMILY MEDICINE | Facility: CLINIC | Age: 66
End: 2021-05-07

## 2021-05-12 ENCOUNTER — COMMUNICATION - HEALTHEAST (OUTPATIENT)
Dept: FAMILY MEDICINE | Facility: CLINIC | Age: 66
End: 2021-05-12

## 2021-05-12 DIAGNOSIS — R60.9 EDEMA, UNSPECIFIED TYPE: ICD-10-CM

## 2021-05-12 DIAGNOSIS — E11.9 DIABETES MELLITUS, TYPE 2 (H): ICD-10-CM

## 2021-05-12 RX ORDER — LISINOPRIL/HYDROCHLOROTHIAZIDE 10-12.5 MG
1 TABLET ORAL DAILY
Qty: 90 TABLET | Refills: 0 | Status: SHIPPED | OUTPATIENT
Start: 2021-05-12 | End: 2021-08-18

## 2021-05-13 ENCOUNTER — COMMUNICATION - HEALTHEAST (OUTPATIENT)
Dept: FAMILY MEDICINE | Facility: CLINIC | Age: 66
End: 2021-05-13

## 2021-05-13 DIAGNOSIS — E11.9 TYPE 2 DIABETES MELLITUS WITHOUT COMPLICATION, WITHOUT LONG-TERM CURRENT USE OF INSULIN (H): ICD-10-CM

## 2021-05-14 RX ORDER — DULAGLUTIDE 0.75 MG/.5ML
0.75 INJECTION, SOLUTION SUBCUTANEOUS WEEKLY
Qty: 10 ML | Refills: 2 | Status: SHIPPED | OUTPATIENT
Start: 2021-05-14 | End: 2022-06-23

## 2021-05-20 ENCOUNTER — RECORDS - HEALTHEAST (OUTPATIENT)
Dept: ADMINISTRATIVE | Facility: OTHER | Age: 66
End: 2021-05-20

## 2021-05-20 ENCOUNTER — OFFICE VISIT - HEALTHEAST (OUTPATIENT)
Dept: FAMILY MEDICINE | Facility: CLINIC | Age: 66
End: 2021-05-20

## 2021-05-20 DIAGNOSIS — Z12.31 VISIT FOR SCREENING MAMMOGRAM: ICD-10-CM

## 2021-05-20 DIAGNOSIS — E66.812 CLASS 2 SEVERE OBESITY DUE TO EXCESS CALORIES WITH SERIOUS COMORBIDITY AND BODY MASS INDEX (BMI) OF 37.0 TO 37.9 IN ADULT (H): ICD-10-CM

## 2021-05-20 DIAGNOSIS — I10 BENIGN ESSENTIAL HYPERTENSION: ICD-10-CM

## 2021-05-20 DIAGNOSIS — R25.2 LEG CRAMPS: ICD-10-CM

## 2021-05-20 DIAGNOSIS — E66.01 CLASS 2 SEVERE OBESITY DUE TO EXCESS CALORIES WITH SERIOUS COMORBIDITY AND BODY MASS INDEX (BMI) OF 37.0 TO 37.9 IN ADULT (H): ICD-10-CM

## 2021-05-20 DIAGNOSIS — E11.9 TYPE 2 DIABETES MELLITUS WITHOUT COMPLICATION, WITHOUT LONG-TERM CURRENT USE OF INSULIN (H): ICD-10-CM

## 2021-05-20 LAB
ERYTHROCYTE [DISTWIDTH] IN BLOOD BY AUTOMATED COUNT: 12.8 % (ref 11–14.5)
FERRITIN SERPL-MCNC: 52 NG/ML (ref 10–130)
HBA1C MFR BLD: 6.9 %
HCT VFR BLD AUTO: 39.5 % (ref 35–47)
HGB BLD-MCNC: 13.2 G/DL (ref 12–16)
MAGNESIUM SERPL-MCNC: 1.9 MG/DL (ref 1.8–2.6)
MCH RBC QN AUTO: 29.5 PG (ref 27–34)
MCHC RBC AUTO-ENTMCNC: 33.4 G/DL (ref 32–36)
MCV RBC AUTO: 88 FL (ref 80–100)
PLATELET # BLD AUTO: 342 THOU/UL (ref 140–440)
PMV BLD AUTO: 10.1 FL (ref 7–10)
RBC # BLD AUTO: 4.48 MILL/UL (ref 3.8–5.4)
WBC: 8.3 THOU/UL (ref 4–11)

## 2021-05-24 ENCOUNTER — RECORDS - HEALTHEAST (OUTPATIENT)
Dept: ADMINISTRATIVE | Facility: CLINIC | Age: 66
End: 2021-05-24

## 2021-05-25 ENCOUNTER — COMMUNICATION - HEALTHEAST (OUTPATIENT)
Dept: FAMILY MEDICINE | Facility: CLINIC | Age: 66
End: 2021-05-25

## 2021-05-25 ENCOUNTER — RECORDS - HEALTHEAST (OUTPATIENT)
Dept: ADMINISTRATIVE | Facility: CLINIC | Age: 66
End: 2021-05-25

## 2021-05-25 DIAGNOSIS — E11.9 TYPE 2 DIABETES MELLITUS WITHOUT COMPLICATION (H): ICD-10-CM

## 2021-05-26 ENCOUNTER — RECORDS - HEALTHEAST (OUTPATIENT)
Dept: ADMINISTRATIVE | Facility: CLINIC | Age: 66
End: 2021-05-26

## 2021-05-26 DIAGNOSIS — E11.9 TYPE 2 DIABETES MELLITUS WITHOUT COMPLICATION (H): ICD-10-CM

## 2021-05-26 RX ORDER — BLOOD SUGAR DIAGNOSTIC
STRIP MISCELLANEOUS
Qty: 100 STRIP | Refills: 1 | Status: SHIPPED | OUTPATIENT
Start: 2021-05-26 | End: 2023-01-31

## 2021-05-27 ENCOUNTER — RECORDS - HEALTHEAST (OUTPATIENT)
Dept: ADMINISTRATIVE | Facility: CLINIC | Age: 66
End: 2021-05-27

## 2021-05-27 ENCOUNTER — COMMUNICATION - HEALTHEAST (OUTPATIENT)
Dept: FAMILY MEDICINE | Facility: CLINIC | Age: 66
End: 2021-05-27

## 2021-05-27 VITALS — SYSTOLIC BLOOD PRESSURE: 108 MMHG | DIASTOLIC BLOOD PRESSURE: 60 MMHG | HEART RATE: 80 BPM

## 2021-05-27 VITALS — WEIGHT: 220 LBS

## 2021-05-27 NOTE — PROGRESS NOTES
Assessment and Plan:    1. Routine general medical examination at a health care facility  Discussed consuming a healthy diet and exercising.  Provided pneumococcal vaccine.  She plans on following up for the shingles vaccine.  - Gynecologic Cytology (PAP Smear)  - Pneumococcal conjugate vaccine 13-valent 6wks-17yrs; >50yrs    2. Type 2 diabetes mellitus without complication (H)  We will check A1c and notify patient of results.  She continues metformin and glipizide as prescribed.  - blood glucose test strips; Use 1 each As Directed 2 (two) times a day. Dispense brand per patient's insurance at pharmacy discretion. (E11.9)  Dispense: 100 strip; Refill: 5  - Glycosylated Hemoglobin A1c    3. Benign essential hypertension  Blood pressure is controlled.  She continues lisinopril-hydrochlorothiazide.    4. Hypothyroidism, unspecified type  This is controlled.  She continues levothyroxine.    5. Hypertriglyceridemia  She is taking simvastatin 10 mg daily.    6. Class 2 severe obesity due to excess calories with serious comorbidity and body mass index (BMI) of 39.0 to 39.9 in adult (H)  The following high BMI interventions were performed this visit: encouragement to exercise and lifestyle education regarding diet    7. Cervical polyp  Will refer to OB/GYN for further evaluation.  She is content with the plan.  - Ambulatory referral to Obstetrics / Gynecology      Subjective:     Korin is a 64 y.o. female presenting to the clinic for a female physical.     LMP: 4 years ago   Hx of abnormal pap smear: none  Last pap smear: 4/23/13   Perform self-breast exams: yes  Vaginal discharge or irritation: none  Sexually active: in relationship with partner for over 20 years   Contraception: none  Concerns for STDs: none  Previous pregnancies:none  Adopted two boys ages 15 and 12.     She is taking Lisinopril-HCTZ for hypertension which is controlled.  She takes levothyroxine 200 mcg for hypothyroidism.  Last TSH was 0.42 9/17/18.   She takes simvastatin 10 mg daily for hyperlipidemia.  Last cholesterol check was on 9/17/18 with a total cholesterol 129, triglycerides 181, HDL 30, LDL 55.  She has type 2 diabetes.  She checks her blood sugars once daily fasting.  They have been ranging around 140.  She is taking metformin 1000 mg twice daily and glipizide XL 5 mg daily.  Last hemoglobin A1c was 7.7% on 1/10/19.  She is feeling well today and has no other concerns.    Review of systems:  I performed a 10 point review of systems.  All pertinent positives and negatives are noted in the HPI. All others are negative.     No Known Allergies    Current Outpatient Medications on File Prior to Visit   Medication Sig Dispense Refill     aspirin 81 MG EC tablet Take 81 mg by mouth daily.       blood-glucose meter Misc Use 1 Device As Directed daily. 1 each 0     ciclopirox (PENLAC) 8 % solution Apply over nail and surrounding skin. Apply daily over previous coat. After seven (7) days, may remove with alcohol and continue cycle. 6.6 mL 3     generic lancets (FINGERSTIX LANCETS) Dispense brand per patient's insurance at pharmacy discretion. 100 each 3     glipiZIDE (GLUCOTROL XL) 5 MG 24 hr tablet Take 1 tablet (5 mg total) by mouth daily with breakfast. 90 tablet 0     levothyroxine (SYNTHROID, LEVOTHROID) 200 MCG tablet TAKE 1 TABLET(200 MCG) BY MOUTH DAILY 90 tablet 0     lisinopril-hydrochlorothiazide (PRINZIDE,ZESTORETIC) 10-12.5 mg per tablet Take 1 tablet by mouth daily. 90 tablet 0     metFORMIN (GLUCOPHAGE) 500 MG tablet TAKE 2 TABLETS(1000 MG) BY MOUTH TWICE DAILY WITH MEALS 360 tablet 0     simvastatin (ZOCOR) 10 MG tablet TAKE 1 TABLET(10 MG) BY MOUTH BEDTIME 90 tablet 2     [DISCONTINUED] blood glucose test strips Use 1 each As Directed daily. Dispense brand per patient's insurance at pharmacy discretion. (E11.9) (Patient taking differently: Use 1 each As Directed 2 (two) times a day. Dispense brand per patient's insurance at pharmacy  discretion. (W08.9      ) 100 strip 3     No current facility-administered medications on file prior to visit.        Social History     Socioeconomic History     Marital status: Single     Spouse name: Not on file     Number of children: Not on file     Years of education: Not on file     Highest education level: Not on file   Occupational History     Not on file   Social Needs     Financial resource strain: Not on file     Food insecurity:     Worry: Not on file     Inability: Not on file     Transportation needs:     Medical: Not on file     Non-medical: Not on file   Tobacco Use     Smoking status: Former Smoker     Smokeless tobacco: Never Used     Tobacco comment: quit in 1983   Substance and Sexual Activity     Alcohol use: Yes     Comment: couple per week     Drug use: No     Sexual activity: Not on file   Lifestyle     Physical activity:     Days per week: Not on file     Minutes per session: Not on file     Stress: Not on file   Relationships     Social connections:     Talks on phone: Not on file     Gets together: Not on file     Attends Jainism service: Not on file     Active member of club or organization: Not on file     Attends meetings of clubs or organizations: Not on file     Relationship status: Not on file     Intimate partner violence:     Fear of current or ex partner: Not on file     Emotionally abused: Not on file     Physically abused: Not on file     Forced sexual activity: Not on file   Other Topics Concern     Not on file   Social History Narrative     Not on file       No past medical history on file.    Family History   Problem Relation Age of Onset     Heart disease Mother      Diabetes Mother      Hypertension Father      Atrial fibrillation Father        Past Surgical History:   Procedure Laterality Date     CHOLECYSTECTOMY       TX APPENDECTOMY,RUPT APPENDX+ABSCESS      Description: Appendectomy For Ruptured Appendix;  Proc Date: 10/01/2005;     [place] KNEE SCOPE,DIAGNOSTIC       Description: Arthroscopy Knee Left;  Recorded: 08/15/2007;     [place] KNEE SCOPE,DIAGNOSTIC      Description: Arthroscopy Knee Left;  Proc Date: 08/01/2007;     [place] REDUCTION OF LARGE BREAST      Description: Breast Surgery Reduction Procedure Bilateral;  Recorded: 08/15/2007;     [place] SHLDR ARTHROSCOP,DIAGNOSTIC      Description: Arthroscopy Shoulder Right;  Recorded: 08/15/2007;  Comments: w/decompression     [place] STAPEDECTOMY      Description: Stapedectomy;  Recorded: 08/15/2007;  Comments: assoc hearing loss---uses aid     REDUCTION MAMMAPLASTY Bilateral Around 2008       Objective:     Vitals:    04/11/19 1126   BP: 120/66   Pulse: 69   SpO2: 97%       Patient is alert, no obvious distress.   Skin: Warm, dry.  No rashes or lesions. Skin turgor rapid return.   HEENT:  Eyes normal.  Ears normal.  Nose patent, mucosa pink.  Oropharynx mucosa pink, no lesions or tonsil enlargement.   Neck:  Supple, without lymphadenopathy, bruits, JVD. Thyroid normal texture and size.    Lungs:  Clear to auscultation.  No wheezing, rales noted.  Respirations even and unlabored.   Heart:  Regular rate and rhythm.  No murmurs.   Breasts:  Normal.  No surrounding adenopathy.   Abdomen: Soft, nontender.  No organomegaly.  Bowel sounds normoactive.  No guarding or masses noted.   :  External genitalia normal.  Normal vaginal mucosa.  Large cervical polyp is protruding from the cervical os. Uterus normal size, no masses.  Adnexa no masses palpated bilaterally.   Musculoskeletal:  Full ROM of extremities.  Muscle strength equal +5/5.   Neurological:  Cranial nerves 2-12 intact.

## 2021-05-28 NOTE — TELEPHONE ENCOUNTER
Refill Approved    Rx renewed per Medication Renewal Policy. Medication was last renewed on 3/4/19.    Tonya Chatman, [facility] Triage/Med Refill 4/29/2019     Requested Prescriptions   Pending Prescriptions Disp Refills     lisinopril-hydrochlorothiazide (PRINZIDE,ZESTORETIC) 10-12.5 mg per tablet [Pharmacy Med Name: LISINOPRIL-HCTZ 10-12.5MG TABLET] 90 tablet 0     Sig: TAKE 1 TABLET BY MOUTH  DAILY       Diuretics/Combination Diuretics Refill Protocol  Passed - 4/27/2019  4:41 AM        Passed - Visit with PCP or prescribing provider visit in past 12 months     Last office visit with prescriber/PCP: 1/10/2019 Racquel Dunn CNP OR same dept: 1/10/2019 Racquel Dunn CNP OR same specialty: 1/10/2019 Racquel Dunn CNP  Last physical: 4/11/2019 Last MTM visit: Visit date not found   Next visit within 3 mo: Visit date not found  Next physical within 3 mo: Visit date not found  Prescriber OR PCP: Racquel Dunn CNP  Last diagnosis associated with med order: 1. Edema, unspecified type  - lisinopril-hydrochlorothiazide (PRINZIDE,ZESTORETIC) 10-12.5 mg per tablet [Pharmacy Med Name: LISINOPRIL-HCTZ 10-12.5MG TABLET]; Take 1 tablet by mouth daily.  Dispense: 90 tablet; Refill: 0    2. Diabetes mellitus, type 2 (H)  - metFORMIN (GLUCOPHAGE) 500 MG tablet [Pharmacy Med Name: MetFORMIN 500MG TABLET]; TAKE 2 TABLETS BY MOUTH  TWICE A DAY WITH MEALS  Dispense: 360 tablet; Refill: 0    3. Type 2 diabetes mellitus without complication (H)  - glipiZIDE (GLUCOTROL XL) 5 MG 24 hr tablet [Pharmacy Med Name: GLIPIZIDE  5MG  TAB  XL]; TAKE 1 TABLET BY MOUTH  DAILY WITH BREAKFAST  Dispense: 90 tablet; Refill: 0    4. Hypothyroidism, unspecified type  - levothyroxine (SYNTHROID, LEVOTHROID) 200 MCG tablet [Pharmacy Med Name: LEVOTHYROXINE  0.2MG  TAB]; TAKE 1 TABLET BY MOUTH  DAILY  Dispense: 90 tablet; Refill: 0    If protocol passes may refill for 12 months if within 3 months of last provider visit (or a total of 15 months).              Passed - Serum Potassium in past 12 months      Lab Results   Component Value Date    Potassium 4.5 09/17/2018             Passed - Serum Sodium in past 12 months      Lab Results   Component Value Date    Sodium 140 09/17/2018             Passed - Blood pressure on file in past 12 months     BP Readings from Last 1 Encounters:   04/17/19 108/68             Passed - Serum Creatinine in past 12 months      Creatinine   Date Value Ref Range Status   09/17/2018 0.72 0.60 - 1.10 mg/dL Final             metFORMIN (GLUCOPHAGE) 500 MG tablet [Pharmacy Med Name: MetFORMIN 500MG TABLET] 360 tablet 0     Sig: TAKE 2 TABLETS BY MOUTH  TWICE A DAY WITH MEALS       Metformin Refill Protocol Passed - 4/27/2019  4:41 AM        Passed - Blood pressure in last 12 months     BP Readings from Last 1 Encounters:   04/17/19 108/68             Passed - LFT or AST or ALT in last 12 months     Albumin   Date Value Ref Range Status   09/17/2018 3.7 3.5 - 5.0 g/dL Final     Bilirubin, Total   Date Value Ref Range Status   09/17/2018 0.5 0.0 - 1.0 mg/dL Final     Bilirubin, Direct   Date Value Ref Range Status   08/16/2013 <0.1 <0.6 mg/dL Final     Alkaline Phosphatase   Date Value Ref Range Status   09/17/2018 64 45 - 120 U/L Final     AST   Date Value Ref Range Status   09/17/2018 21 0 - 40 U/L Final     ALT   Date Value Ref Range Status   09/17/2018 36 0 - 45 U/L Final     Protein, Total   Date Value Ref Range Status   09/17/2018 6.4 6.0 - 8.0 g/dL Final                Passed - GFR or Serum Creatinine in last 6 months     GFR MDRD Non Af Amer   Date Value Ref Range Status   09/17/2018 >60 >60 mL/min/1.73m2 Final     GFR MDRD Af Amer   Date Value Ref Range Status   09/17/2018 >60 >60 mL/min/1.73m2 Final             Passed - Visit with PCP or prescribing provider visit in last 6 months or next 3 months     Last office visit with prescriber/PCP: 1/10/2019 OR same dept: 1/10/2019 Racquel Dunn CNP OR same specialty: 1/10/2019  Racquel Dunn CNP Last physical: 4/11/2019 Last MTM visit: Visit date not found         Next appt within 3 mo: Visit date not found  Next physical within 3 mo: Visit date not found  Prescriber OR PCP: Racquel Dunn CNP  Last diagnosis associated with med order: 1. Edema, unspecified type  - lisinopril-hydrochlorothiazide (PRINZIDE,ZESTORETIC) 10-12.5 mg per tablet [Pharmacy Med Name: LISINOPRIL-HCTZ 10-12.5MG TABLET]; Take 1 tablet by mouth daily.  Dispense: 90 tablet; Refill: 0    2. Diabetes mellitus, type 2 (H)  - metFORMIN (GLUCOPHAGE) 500 MG tablet [Pharmacy Med Name: MetFORMIN 500MG TABLET]; TAKE 2 TABLETS BY MOUTH  TWICE A DAY WITH MEALS  Dispense: 360 tablet; Refill: 0    3. Type 2 diabetes mellitus without complication (H)  - glipiZIDE (GLUCOTROL XL) 5 MG 24 hr tablet [Pharmacy Med Name: GLIPIZIDE  5MG  TAB  XL]; TAKE 1 TABLET BY MOUTH  DAILY WITH BREAKFAST  Dispense: 90 tablet; Refill: 0    4. Hypothyroidism, unspecified type  - levothyroxine (SYNTHROID, LEVOTHROID) 200 MCG tablet [Pharmacy Med Name: LEVOTHYROXINE  0.2MG  TAB]; TAKE 1 TABLET BY MOUTH  DAILY  Dispense: 90 tablet; Refill: 0     If protocol passes may refill for 12 months if within 3 months of last provider visit (or a total of 15 months).           Passed - A1C in last 6 months     Hemoglobin A1c   Date Value Ref Range Status   04/11/2019 7.3 (H) 3.5 - 6.0 % Final               Passed - Microalbumin in last year      Microalbumin, Random Urine   Date Value Ref Range Status   09/17/2018 1.58 0.00 - 1.99 mg/dL Final                  glipiZIDE (GLUCOTROL XL) 5 MG 24 hr tablet [Pharmacy Med Name: GLIPIZIDE  5MG  TAB  XL] 90 tablet 0     Sig: TAKE 1 TABLET BY MOUTH  DAILY WITH BREAKFAST       Oral Hypoglycemics Refill Protocol Passed - 4/27/2019  4:41 AM        Passed - Visit with PCP or prescribing provider visit in last 6 months       Last office visit with prescriber/PCP: 1/10/2019 OR same dept: 1/10/2019 Racquel Dunn CNP OR same  specialty: 1/10/2019 Racquel Dunn CNP Last physical: 4/11/2019 Last MTM visit: Visit date not found         Next appt within 3 mo: Visit date not found  Next physical within 3 mo: Visit date not found  Prescriber OR PCP: Racquel Dunn CNP  Last diagnosis associated with med order: 1. Edema, unspecified type  - lisinopril-hydrochlorothiazide (PRINZIDE,ZESTORETIC) 10-12.5 mg per tablet [Pharmacy Med Name: LISINOPRIL-HCTZ 10-12.5MG TABLET]; Take 1 tablet by mouth daily.  Dispense: 90 tablet; Refill: 0    2. Diabetes mellitus, type 2 (H)  - metFORMIN (GLUCOPHAGE) 500 MG tablet [Pharmacy Med Name: MetFORMIN 500MG TABLET]; TAKE 2 TABLETS BY MOUTH  TWICE A DAY WITH MEALS  Dispense: 360 tablet; Refill: 0    3. Type 2 diabetes mellitus without complication (H)  - glipiZIDE (GLUCOTROL XL) 5 MG 24 hr tablet [Pharmacy Med Name: GLIPIZIDE  5MG  TAB  XL]; TAKE 1 TABLET BY MOUTH  DAILY WITH BREAKFAST  Dispense: 90 tablet; Refill: 0    4. Hypothyroidism, unspecified type  - levothyroxine (SYNTHROID, LEVOTHROID) 200 MCG tablet [Pharmacy Med Name: LEVOTHYROXINE  0.2MG  TAB]; TAKE 1 TABLET BY MOUTH  DAILY  Dispense: 90 tablet; Refill: 0     If protocol passes may refill for 12 months if within 3 months of last provider visit (or a total of 15 months).           Passed - A1C in last 6 months     Hemoglobin A1c   Date Value Ref Range Status   04/11/2019 7.3 (H) 3.5 - 6.0 % Final               Passed - Microalbumin in last year      Microalbumin, Random Urine   Date Value Ref Range Status   09/17/2018 1.58 0.00 - 1.99 mg/dL Final                  Passed - Blood pressure in last year     BP Readings from Last 1 Encounters:   04/17/19 108/68             Passed - Serum creatinine in last year     Creatinine   Date Value Ref Range Status   09/17/2018 0.72 0.60 - 1.10 mg/dL Final             levothyroxine (SYNTHROID, LEVOTHROID) 200 MCG tablet [Pharmacy Med Name: LEVOTHYROXINE  0.2MG  TAB] 90 tablet 0     Sig: TAKE 1 TABLET BY MOUTH   DAILY       Thyroid Hormones Protocol Passed - 4/27/2019  4:41 AM        Passed - Provider visit in past 12 months or next 3 months     Last office visit with prescriber/PCP: 1/10/2019 Racquel Dunn CNP OR same dept: 1/10/2019 Racquel Dunn CNP OR same specialty: 1/10/2019 Racquel Dunn CNP  Last physical: 4/11/2019 Last MTM visit: Visit date not found   Next visit within 3 mo: Visit date not found  Next physical within 3 mo: Visit date not found  Prescriber OR PCP: Racquel Dunn CNP  Last diagnosis associated with med order: 1. Edema, unspecified type  - lisinopril-hydrochlorothiazide (PRINZIDE,ZESTORETIC) 10-12.5 mg per tablet [Pharmacy Med Name: LISINOPRIL-HCTZ 10-12.5MG TABLET]; Take 1 tablet by mouth daily.  Dispense: 90 tablet; Refill: 0    2. Diabetes mellitus, type 2 (H)  - metFORMIN (GLUCOPHAGE) 500 MG tablet [Pharmacy Med Name: MetFORMIN 500MG TABLET]; TAKE 2 TABLETS BY MOUTH  TWICE A DAY WITH MEALS  Dispense: 360 tablet; Refill: 0    3. Type 2 diabetes mellitus without complication (H)  - glipiZIDE (GLUCOTROL XL) 5 MG 24 hr tablet [Pharmacy Med Name: GLIPIZIDE  5MG  TAB  XL]; TAKE 1 TABLET BY MOUTH  DAILY WITH BREAKFAST  Dispense: 90 tablet; Refill: 0    4. Hypothyroidism, unspecified type  - levothyroxine (SYNTHROID, LEVOTHROID) 200 MCG tablet [Pharmacy Med Name: LEVOTHYROXINE  0.2MG  TAB]; TAKE 1 TABLET BY MOUTH  DAILY  Dispense: 90 tablet; Refill: 0    If protocol passes may refill for 12 months if within 3 months of last provider visit (or a total of 15 months).             Passed - TSH on file in past 12 months for patient age 12 & older     TSH   Date Value Ref Range Status   09/17/2018 0.42 0.30 - 5.00 uIU/mL Final

## 2021-05-29 ENCOUNTER — RECORDS - HEALTHEAST (OUTPATIENT)
Dept: ADMINISTRATIVE | Facility: CLINIC | Age: 66
End: 2021-05-29

## 2021-05-30 ENCOUNTER — RECORDS - HEALTHEAST (OUTPATIENT)
Dept: ADMINISTRATIVE | Facility: CLINIC | Age: 66
End: 2021-05-30

## 2021-05-30 VITALS — WEIGHT: 238.4 LBS | BODY MASS INDEX: 39.07 KG/M2

## 2021-05-31 ENCOUNTER — RECORDS - HEALTHEAST (OUTPATIENT)
Dept: ADMINISTRATIVE | Facility: CLINIC | Age: 66
End: 2021-05-31

## 2021-05-31 VITALS — WEIGHT: 234 LBS | BODY MASS INDEX: 38.94 KG/M2

## 2021-05-31 VITALS — BODY MASS INDEX: 35.77 KG/M2 | WEIGHT: 214.7 LBS | HEIGHT: 65 IN

## 2021-06-01 VITALS — WEIGHT: 238.9 LBS | BODY MASS INDEX: 39.76 KG/M2

## 2021-06-01 VITALS — BODY MASS INDEX: 38.75 KG/M2 | WEIGHT: 232.6 LBS | HEIGHT: 65 IN

## 2021-06-01 NOTE — TELEPHONE ENCOUNTER
Refill Approved    Rx renewed per Medication Renewal Policy. Medication was last renewed on 4/11/19.    Gema Velarde, [facility] Triage/Med Refill 9/27/2019     Requested Prescriptions   Pending Prescriptions Disp Refills     ACCU-CHEK RENATE PLUS TEST STRP strips [Pharmacy Med Name: ACCU-CHEK RENATE PLUS TEST STRIP 50S] 100 strip 0     Sig: USE TO TEST DAILY AS DIRECTED.       Diabetic Supplies Refill Protocol Passed - 9/27/2019 10:21 AM        Passed - Visit with PCP or prescribing provider visit in last 6 months     Last office visit with prescriber/PCP: 1/10/2019 Racquel Dunn CNP OR same dept: 1/10/2019 Racquel Dunn CNP OR same specialty: 1/10/2019 Racquel Dunn CNP  Last physical: 4/11/2019 Last MTM visit: Visit date not found   Next visit within 3 mo: Visit date not found  Next physical within 3 mo: Visit date not found  Prescriber OR PCP: Racquel Dunn CNP  Last diagnosis associated with med order: 1. Type 2 diabetes mellitus without complication (H)  - ACCU-CHEK RENATE PLUS TEST STRP strips [Pharmacy Med Name: ACCU-CHEK RENATE PLUS TEST STRIP 50S]; USE TO TEST DAILY AS DIRECTED.  Dispense: 100 strip; Refill: 0    If protocol passes may refill for 12 months if within 3 months of last provider visit (or a total of 15 months).             Passed - A1C in last 6 months     Hemoglobin A1c   Date Value Ref Range Status   04/11/2019 7.3 (H) 3.5 - 6.0 % Final

## 2021-06-01 NOTE — TELEPHONE ENCOUNTER
Refill Approved    Rx renewed per Medication Renewal Policy. Medication was last renewed on 3/4/19, last OV 4/17/19.    Romy Velasquez, [facility] Triage/Med Refill 9/29/2019     Requested Prescriptions   Pending Prescriptions Disp Refills     simvastatin (ZOCOR) 10 MG tablet [Pharmacy Med Name: SIMVASTATIN  10MG  TAB] 90 tablet 2     Sig: TAKE 1 TABLET BY MOUTH AT  BEDTIME       Statins Refill Protocol (Hmg CoA Reductase Inhibitors) Passed - 9/28/2019  8:31 PM        Passed - PCP or prescribing provider visit in past 12 months      Last office visit with prescriber/PCP: 1/10/2019 Racquel Dunn CNP OR same dept: 1/10/2019 Racquel Dunn CNP OR same specialty: 1/10/2019 Racquel Dunn CNP  Last physical: 4/11/2019 Last MTM visit: Visit date not found   Next visit within 3 mo: Visit date not found  Next physical within 3 mo: Visit date not found  Prescriber OR PCP: Racquel Dunn CNP  Last diagnosis associated with med order: 1. Mixed hyperlipidemia  - simvastatin (ZOCOR) 10 MG tablet [Pharmacy Med Name: SIMVASTATIN  10MG  TAB]; TAKE 1 TABLET BY MOUTH AT  BEDTIME  Dispense: 90 tablet; Refill: 2    If protocol passes may refill for 12 months if within 3 months of last provider visit (or a total of 15 months).

## 2021-06-02 VITALS — HEIGHT: 65 IN | BODY MASS INDEX: 39.82 KG/M2 | WEIGHT: 239 LBS

## 2021-06-02 VITALS — WEIGHT: 239 LBS | BODY MASS INDEX: 39.82 KG/M2 | HEIGHT: 65 IN

## 2021-06-02 VITALS — BODY MASS INDEX: 39.34 KG/M2 | HEIGHT: 65 IN | WEIGHT: 236.1 LBS

## 2021-06-02 VITALS — HEIGHT: 65 IN | WEIGHT: 238.7 LBS | BODY MASS INDEX: 39.77 KG/M2

## 2021-06-02 VITALS — BODY MASS INDEX: 39.85 KG/M2 | WEIGHT: 239.5 LBS

## 2021-06-02 NOTE — TELEPHONE ENCOUNTER
RN cannot approve Refill Request    RN can NOT refill this medication --> PCP messaged that patient is overdue for Labs.   Last office visit:  4/11/2019.    Marisa Day, [facility] Triage/Med Refill 10/4/2019    Requested Prescriptions   Pending Prescriptions Disp Refills     metFORMIN (GLUCOPHAGE) 500 MG tablet 360 tablet 2     Sig: TAKE 2 TABLETS BY MOUTH  TWICE A DAY WITH MEALS       Metformin Refill Protocol Failed - 10/4/2019  2:25 PM        Failed - LFT or AST or ALT in last 12 months     Albumin   Date Value Ref Range Status   09/17/2018 3.7 3.5 - 5.0 g/dL Final     Bilirubin, Total   Date Value Ref Range Status   09/17/2018 0.5 0.0 - 1.0 mg/dL Final     Bilirubin, Direct   Date Value Ref Range Status   08/16/2013 <0.1 <0.6 mg/dL Final     Alkaline Phosphatase   Date Value Ref Range Status   09/17/2018 64 45 - 120 U/L Final     AST   Date Value Ref Range Status   09/17/2018 21 0 - 40 U/L Final     ALT   Date Value Ref Range Status   09/17/2018 36 0 - 45 U/L Final     Protein, Total   Date Value Ref Range Status   09/17/2018 6.4 6.0 - 8.0 g/dL Final                Failed - GFR or Serum Creatinine in last 6 months     GFR MDRD Non Af Amer   Date Value Ref Range Status   09/17/2018 >60 >60 mL/min/1.73m2 Final     GFR MDRD Af Amer   Date Value Ref Range Status   09/17/2018 >60 >60 mL/min/1.73m2 Final             Failed - Microalbumin in last year      Microalbumin, Random Urine   Date Value Ref Range Status   09/17/2018 1.58 0.00 - 1.99 mg/dL Final                  Passed - Blood pressure in last 12 months     BP Readings from Last 1 Encounters:   04/17/19 108/68             Passed - Visit with PCP or prescribing provider visit in last 6 months or next 3 months     Last office visit with prescriber/PCP: Visit date not found OR same dept: 1/10/2019 Racquel Dunn CNP OR same specialty: 1/10/2019 Racquel Dunn CNP Last physical: 4/11/2019 Last MTM visit: Visit date not found         Next appt within  3 mo: Visit date not found  Next physical within 3 mo: Visit date not found  Prescriber OR PCP: Racquel Dunn CNP  Last diagnosis associated with med order: 1. Diabetes mellitus, type 2 (H)  - metFORMIN (GLUCOPHAGE) 500 MG tablet; TAKE 2 TABLETS BY MOUTH  TWICE A DAY WITH MEALS  Dispense: 360 tablet; Refill: 2    2. Hypothyroidism, unspecified type  - levothyroxine (SYNTHROID, LEVOTHROID) 200 MCG tablet; TAKE 1 TABLET BY MOUTH  DAILY  Dispense: 90 tablet; Refill: 2    3. Edema, unspecified type  - lisinopril-hydrochlorothiazide (PRINZIDE,ZESTORETIC) 10-12.5 mg per tablet; Take 1 tablet by mouth daily.  Dispense: 90 tablet; Refill: 2     If protocol passes may refill for 12 months if within 3 months of last provider visit (or a total of 15 months).           Passed - A1C in last 6 months     Hemoglobin A1c   Date Value Ref Range Status   04/11/2019 7.3 (H) 3.5 - 6.0 % Final               levothyroxine (SYNTHROID, LEVOTHROID) 200 MCG tablet 90 tablet 2     Sig: TAKE 1 TABLET BY MOUTH  DAILY       Thyroid Hormones Protocol Failed - 10/4/2019  2:25 PM        Failed - TSH on file in past 12 months for patient age 12 & older     TSH   Date Value Ref Range Status   09/17/2018 0.42 0.30 - 5.00 uIU/mL Final                   Passed - Provider visit in past 12 months or next 3 months     Last office visit with prescriber/PCP: 1/10/2019 Racquel Dunn CNP OR same dept: 1/10/2019 Racquel Dunn CNP OR same specialty: 1/10/2019 Racquel Dunn CNP  Last physical: 4/11/2019 Last MTM visit: Visit date not found   Next visit within 3 mo: Visit date not found  Next physical within 3 mo: Visit date not found  Prescriber OR PCP: Racquel Dunn CNP  Last diagnosis associated with med order: 1. Diabetes mellitus, type 2 (H)  - metFORMIN (GLUCOPHAGE) 500 MG tablet; TAKE 2 TABLETS BY MOUTH  TWICE A DAY WITH MEALS  Dispense: 360 tablet; Refill: 2    2. Hypothyroidism, unspecified type  - levothyroxine (SYNTHROID, LEVOTHROID) 200 MCG  tablet; TAKE 1 TABLET BY MOUTH  DAILY  Dispense: 90 tablet; Refill: 2    3. Edema, unspecified type  - lisinopril-hydrochlorothiazide (PRINZIDE,ZESTORETIC) 10-12.5 mg per tablet; Take 1 tablet by mouth daily.  Dispense: 90 tablet; Refill: 2    If protocol passes may refill for 12 months if within 3 months of last provider visit (or a total of 15 months).             lisinopril-hydrochlorothiazide (PRINZIDE,ZESTORETIC) 10-12.5 mg per tablet 90 tablet 2     Sig: Take 1 tablet by mouth daily.       Diuretics/Combination Diuretics Refill Protocol  Failed - 10/4/2019  2:25 PM        Failed - Serum Potassium in past 12 months      No results found for: LN-POTASSIUM          Failed - Serum Sodium in past 12 months      No results found for: LN-SODIUM          Failed - Serum Creatinine in past 12 months      Creatinine   Date Value Ref Range Status   09/17/2018 0.72 0.60 - 1.10 mg/dL Final             Passed - Visit with PCP or prescribing provider visit in past 12 months     Last office visit with prescriber/PCP: 1/10/2019 Racquel Dunn CNP OR same dept: 1/10/2019 Racquel Dunn CNP OR same specialty: 1/10/2019 Racquel Dunn CNP  Last physical: 4/11/2019 Last MTM visit: Visit date not found   Next visit within 3 mo: Visit date not found  Next physical within 3 mo: Visit date not found  Prescriber OR PCP: Racquel Dunn CNP  Last diagnosis associated with med order: 1. Diabetes mellitus, type 2 (H)  - metFORMIN (GLUCOPHAGE) 500 MG tablet; TAKE 2 TABLETS BY MOUTH  TWICE A DAY WITH MEALS  Dispense: 360 tablet; Refill: 2    2. Hypothyroidism, unspecified type  - levothyroxine (SYNTHROID, LEVOTHROID) 200 MCG tablet; TAKE 1 TABLET BY MOUTH  DAILY  Dispense: 90 tablet; Refill: 2    3. Edema, unspecified type  - lisinopril-hydrochlorothiazide (PRINZIDE,ZESTORETIC) 10-12.5 mg per tablet; Take 1 tablet by mouth daily.  Dispense: 90 tablet; Refill: 2    If protocol passes may refill for 12 months if within 3 months of last  provider visit (or a total of 15 months).             Passed - Blood pressure on file in past 12 months     BP Readings from Last 1 Encounters:   04/17/19 108/68

## 2021-06-02 NOTE — PROGRESS NOTES
Assessment and Plan:     1. Type 2 diabetes mellitus without complication, without long-term current use of insulin (H)  She is taking metformin 1000 mg twice daily.  She is prescribed glipizide XL 5 mg but states she only takes it if her blood sugars are elevated in the morning.  Will check A1c notify patient of results.  - Glycosylated Hemoglobin A1c  - Microalbumin, Random Urine    2. Hypothyroidism, unspecified type  We will check thyroid cascade and adjust levothyroxine accordingly.  She is taking 200 mcg daily.  - Thyroid Power  - Thyroid Cascade    3. Hypertriglyceridemia  She continues simvastatin 10 mg daily.  She is not fasting today.  She will follow-up for fasting labs.  - Lipid Cascade    4. Benign essential hypertension  This is controlled with lisinopril-hydrochlorothiazide 10-12.5 mg daily.    5. Screening for osteoporosis  Discussed adequate calcium and vitamin D intake.  - DXA Bone Density Scan; Future    6. Medication management  - Comprehensive Metabolic Panel    7. Class 2 severe obesity due to excess calories with serious comorbidity and body mass index (BMI) of 37.0 to 37.9 in adult (H)  The following high BMI interventions were performed this visit: encouragement to exercise and lifestyle education regarding diet    8. Viral URI  9. Sensation of fullness in both ears  Discussed symptomatic treatment of viral symptoms.  Recommend using over-the-counter nasal saline sprays.  No ear infection is present today.  Provided reassurance.  She is to follow-up if symptoms persist or worsen.    Subjective:     Korin is a 64 y.o. female presenting to the clinic for medication management.  Patient has multiple comorbidities including type 2 diabetes, hypertriglyceridemia, hypertension, hypothyroidism, obesity.  Last hemoglobin A1c was 7.3% on 4/11/2019.  Patient is taking metformin 1000 mg twice daily for diabetes.  She was prescribed glipizide, but states when she takes the medication, her blood  sugars drop into the 70s.  She will only take the glipizide if her blood sugar is elevated in the morning.  Typically her blood sugars run 150 or less fasting.  She is consuming a healthy diet.  She is walking for exercise.  Her last eye exam was 1 month ago.  She is taking simvastatin 10 mg daily for hyperlipidemia.  She is not fasting today.  Blood pressure is controlled with the lisinopril-hydrochlorothiazide.  She is taking levothyroxine 200 mcg daily for hypothyroidism.  Last TSH was 0.42 on 9/17/2018.  She is concerned of bilateral ear fullness.  She has been experiencing cold symptoms for 1 week including sinus congestion, postnasal drainage, productive cough.  She denies headache, stomachache, nausea, vomiting, fever.  She has not had any shortness of breath, chest tightness, wheezing.  She has not tried any over-the-counter products for her symptoms.    Review of Systems: A complete 14 point review of systems was obtained and is negative or as stated in the history of present illness.    Social History     Socioeconomic History     Marital status: Single     Spouse name: Not on file     Number of children: Not on file     Years of education: Not on file     Highest education level: Not on file   Occupational History     Not on file   Social Needs     Financial resource strain: Not on file     Food insecurity:     Worry: Not on file     Inability: Not on file     Transportation needs:     Medical: Not on file     Non-medical: Not on file   Tobacco Use     Smoking status: Former Smoker     Smokeless tobacco: Never Used     Tobacco comment: quit in 1983   Substance and Sexual Activity     Alcohol use: Yes     Comment: couple per week     Drug use: No     Sexual activity: Not on file   Lifestyle     Physical activity:     Days per week: Not on file     Minutes per session: Not on file     Stress: Not on file   Relationships     Social connections:     Talks on phone: Not on file     Gets together: Not on file  "    Attends [employer] service: Not on file     Active member of club or organization: Not on file     Attends meetings of clubs or organizations: Not on file     Relationship status: Not on file     Intimate partner violence:     Fear of current or ex partner: Not on file     Emotionally abused: Not on file     Physically abused: Not on file     Forced sexual activity: Not on file   Other Topics Concern     Not on file   Social History Narrative     Not on file       Active Ambulatory Problems     Diagnosis Date Noted     Obesity      Hypothyroidism      Cholelithiasis      Acute Pancreatitis      Menopause Has Occurred      Vitamin D Deficiency      Diabetes mellitus (H) 06/29/2015     Obesity (BMI 35.0-39.9) with comorbidity (H) 09/17/2018     Benign essential hypertension 11/15/2018     Hypertriglyceridemia 04/11/2019     Resolved Ambulatory Problems     Diagnosis Date Noted     Atypical Chest Pain      Prediabetes      Joint Pain Fingers      Arthropathy Of The Ankle / Foot      Midback Pain      No Additional Past Medical History       Family History   Problem Relation Age of Onset     Heart disease Mother      Diabetes Mother      Hypertension Father      Atrial fibrillation Father        Objective:     /66   Pulse 74   Ht 5' 5\" (1.651 m)   Wt (!) 224 lb 11.2 oz (101.9 kg)   SpO2 96%   BMI 37.39 kg/m      Patient is alert, in no obvious distress.   Skin: Warm, dry.  No lesions or rashes.  Skin turgor rapid return.   HEENT:  Head normocephalic, atraumatic.  Eyes normal. Ears normal.  Nose patent, mucosa pink.  Oropharynx mucosa pink.  No lesions or tonsillar enlargement.   Neck: Supple, no lymphadenopathy.  Lungs:  Clear to auscultation. Respirations even and unlabored.  No wheezing or rales noted.   Heart:  Regular rate and rhythm.  No murmurs.  Musculoskeletal: Monofilament testing is normal bilaterally.              "

## 2021-06-03 VITALS
HEIGHT: 65 IN | DIASTOLIC BLOOD PRESSURE: 66 MMHG | OXYGEN SATURATION: 96 % | WEIGHT: 224.7 LBS | HEART RATE: 74 BPM | SYSTOLIC BLOOD PRESSURE: 108 MMHG | BODY MASS INDEX: 37.44 KG/M2

## 2021-06-03 NOTE — TELEPHONE ENCOUNTER
Refills Approved x 3     Rx renewed per Medication Renewal Policy. Medications were last renewed on: 10/7/2019.  Last office visit: 10/25/2019 with YASMANY Dunn CNP.      Rama Almendarez, [facility] Triage/Med Refill 12/3/2019     Requested Prescriptions   Pending Prescriptions Disp Refills     metFORMIN (GLUCOPHAGE) 500 MG tablet [Pharmacy Med Name: MetFORMIN 500MG TABLET] 360 tablet 0     Sig: TAKE 2 TABLETS BY MOUTH  TWICE A DAY WITH MEALS       Metformin Refill Protocol Passed - 12/3/2019  4:34 AM        Passed - Blood pressure in last 12 months     BP Readings from Last 1 Encounters:   10/25/19 108/66             Passed - LFT or AST or ALT in last 12 months     Albumin   Date Value Ref Range Status   10/25/2019 3.9 3.5 - 5.0 g/dL Final     Bilirubin, Total   Date Value Ref Range Status   10/25/2019 0.5 0.0 - 1.0 mg/dL Final     Bilirubin, Direct   Date Value Ref Range Status   08/16/2013 <0.1 <0.6 mg/dL Final     Alkaline Phosphatase   Date Value Ref Range Status   10/25/2019 70 45 - 120 U/L Final     AST   Date Value Ref Range Status   10/25/2019 25 0 - 40 U/L Final     ALT   Date Value Ref Range Status   10/25/2019 37 0 - 45 U/L Final     Protein, Total   Date Value Ref Range Status   10/25/2019 6.6 6.0 - 8.0 g/dL Final                Passed - GFR or Serum Creatinine in last 6 months     GFR MDRD Non Af Amer   Date Value Ref Range Status   10/25/2019 >60 >60 mL/min/1.73m2 Final     GFR MDRD Af Amer   Date Value Ref Range Status   10/25/2019 >60 >60 mL/min/1.73m2 Final             Passed - Visit with PCP or prescribing provider visit in last 6 months or next 3 months     Last office visit with prescriber/PCP: 10/25/2019 OR same dept: 10/25/2019 Racquel Dunn CNP OR same specialty: 10/25/2019 Racquel Dunn CNP Last physical: Visit date not found Last MTM visit: Visit date not found         Next appt within 3 mo: Visit date not found  Next physical within 3 mo: Visit date not found  Prescriber OR PCP: Racquel  KATARINA Dunn CNP  Last diagnosis associated with med order: 1. Diabetes mellitus, type 2 (H)  - metFORMIN (GLUCOPHAGE) 500 MG tablet [Pharmacy Med Name: MetFORMIN 500MG TABLET]; TAKE 2 TABLETS BY MOUTH  TWICE A DAY WITH MEALS  Dispense: 360 tablet; Refill: 0    2. Edema, unspecified type  - lisinopril-hydrochlorothiazide (PRINZIDE,ZESTORETIC) 10-12.5 mg per tablet [Pharmacy Med Name: LISINOPRIL-HCTZ 10-12.5MG TABLET]; Take 1 tablet by mouth daily.  Dispense: 90 tablet; Refill: 0    3. Hypothyroidism, unspecified type  - levothyroxine (SYNTHROID, LEVOTHROID) 200 MCG tablet [Pharmacy Med Name: LEVOTHYROXINE  0.2MG  TAB]; TAKE 1 TABLET BY MOUTH  DAILY  Dispense: 90 tablet; Refill: 0     If protocol passes may refill for 12 months if within 3 months of last provider visit (or a total of 15 months).           Passed - A1C in last 6 months     Hemoglobin A1c   Date Value Ref Range Status   10/25/2019 7.4 (H) 3.5 - 6.0 % Final               Passed - Microalbumin in last year      Microalbumin, Random Urine   Date Value Ref Range Status   10/25/2019 1.40 0.00 - 1.99 mg/dL Final                  lisinopril-hydrochlorothiazide (PRINZIDE,ZESTORETIC) 10-12.5 mg per tablet [Pharmacy Med Name: LISINOPRIL-HCTZ 10-12.5MG TABLET] 90 tablet 0     Sig: TAKE 1 TABLET BY MOUTH  DAILY       Diuretics/Combination Diuretics Refill Protocol  Passed - 12/3/2019  4:34 AM        Passed - Visit with PCP or prescribing provider visit in past 12 months     Last office visit with prescriber/PCP: 10/25/2019 Racquel Dunn CNP OR same dept: 10/25/2019 Racquel Dnun CNP OR same specialty: 10/25/2019 Racquel Dunn CNP  Last physical: 4/11/2019 Last MTM visit: Visit date not found   Next visit within 3 mo: Visit date not found  Next physical within 3 mo: Visit date not found  Prescriber OR PCP: Racquel Dunn CNP  Last diagnosis associated with med order: 1. Diabetes mellitus, type 2 (H)  - metFORMIN (GLUCOPHAGE) 500 MG tablet [Pharmacy Med Name:  MetFORMIN 500MG TABLET]; TAKE 2 TABLETS BY MOUTH  TWICE A DAY WITH MEALS  Dispense: 360 tablet; Refill: 0    2. Edema, unspecified type  - lisinopril-hydrochlorothiazide (PRINZIDE,ZESTORETIC) 10-12.5 mg per tablet [Pharmacy Med Name: LISINOPRIL-HCTZ 10-12.5MG TABLET]; Take 1 tablet by mouth daily.  Dispense: 90 tablet; Refill: 0    3. Hypothyroidism, unspecified type  - levothyroxine (SYNTHROID, LEVOTHROID) 200 MCG tablet [Pharmacy Med Name: LEVOTHYROXINE  0.2MG  TAB]; TAKE 1 TABLET BY MOUTH  DAILY  Dispense: 90 tablet; Refill: 0    If protocol passes may refill for 12 months if within 3 months of last provider visit (or a total of 15 months).             Passed - Serum Potassium in past 12 months      Lab Results   Component Value Date    Potassium 4.3 10/25/2019             Passed - Serum Sodium in past 12 months      Lab Results   Component Value Date    Sodium 142 10/25/2019             Passed - Blood pressure on file in past 12 months     BP Readings from Last 1 Encounters:   10/25/19 108/66             Passed - Serum Creatinine in past 12 months      Creatinine   Date Value Ref Range Status   10/25/2019 0.82 0.60 - 1.10 mg/dL Final             levothyroxine (SYNTHROID, LEVOTHROID) 200 MCG tablet [Pharmacy Med Name: LEVOTHYROXINE  0.2MG  TAB] 90 tablet 0     Sig: TAKE 1 TABLET BY MOUTH  DAILY       Thyroid Hormones Protocol Passed - 12/3/2019  4:34 AM        Passed - Provider visit in past 12 months or next 3 months     Last office visit with prescriber/PCP: 10/25/2019 Racquel Dunn CNP OR same dept: 10/25/2019 Racquel Dunn CNP OR same specialty: 10/25/2019 Racquel Dunn CNP  Last physical: 4/11/2019 Last MTM visit: Visit date not found   Next visit within 3 mo: Visit date not found  Next physical within 3 mo: Visit date not found  Prescriber OR PCP: Racquel Dunn CNP  Last diagnosis associated with med order: 1. Diabetes mellitus, type 2 (H)  - metFORMIN (GLUCOPHAGE) 500 MG tablet [Pharmacy Med Name:  MetFORMIN 500MG TABLET]; TAKE 2 TABLETS BY MOUTH  TWICE A DAY WITH MEALS  Dispense: 360 tablet; Refill: 0    2. Edema, unspecified type  - lisinopril-hydrochlorothiazide (PRINZIDE,ZESTORETIC) 10-12.5 mg per tablet [Pharmacy Med Name: LISINOPRIL-HCTZ 10-12.5MG TABLET]; Take 1 tablet by mouth daily.  Dispense: 90 tablet; Refill: 0    3. Hypothyroidism, unspecified type  - levothyroxine (SYNTHROID, LEVOTHROID) 200 MCG tablet [Pharmacy Med Name: LEVOTHYROXINE  0.2MG  TAB]; TAKE 1 TABLET BY MOUTH  DAILY  Dispense: 90 tablet; Refill: 0    If protocol passes may refill for 12 months if within 3 months of last provider visit (or a total of 15 months).             Passed - TSH on file in past 12 months for patient age 12 & older     TSH   Date Value Ref Range Status   10/25/2019 2.02 0.30 - 5.00 uIU/mL Final

## 2021-06-04 ENCOUNTER — MEDICAL CORRESPONDENCE (OUTPATIENT)
Dept: HEALTH INFORMATION MANAGEMENT | Facility: CLINIC | Age: 66
End: 2021-06-04

## 2021-06-07 ENCOUNTER — AMBULATORY - HEALTHEAST (OUTPATIENT)
Dept: FAMILY MEDICINE | Facility: CLINIC | Age: 66
End: 2021-06-07

## 2021-06-07 ENCOUNTER — COMMUNICATION - HEALTHEAST (OUTPATIENT)
Dept: FAMILY MEDICINE | Facility: CLINIC | Age: 66
End: 2021-06-07

## 2021-06-08 NOTE — PROGRESS NOTES
ASSESSMENT:   1. Acute sinusitis     2. Cough  albuterol nebulizer solution 3 mL (PROVENTIL)        PLAN:  Augmentin was prescribed for sinusitis.  Advised probiotic while on antibiotic.  Suspect cough is secondary to sinus drainage and bronchospasm. Ventolin inhaler: 2 puffs every 4-6 hours as needed for cough. Use with spacer.  Gradually taper as cough improves.     She was swabbed for pertussis due to her episode of posttussive emesis this evening, though my suspicion is low and I did not treat with antibiotics specifically for pertussis.  Patient is aware she will need to be treated with azithromycin if her pertussis returns positive.      Follow up with your primary care provider if not improving in 3-5 days, sooner if any worsening or new symptoms.      SUBJECTIVE:   Korin Sharma is a 61 y.o. female presents today with 2 weeks complaint of cough. Was mild initially, though 2-3 days ago, cough worsened and became more persistent. Cough is now more productive.  She also complains of nasal congestion, thick green rhinorrhea, postnasal drainage, sinus pressure, ears feel full for 2 weeks. Felt hot and cold for the first few days of the illness, but it has come back for the past several days. Appetite has been low for the past 2-3 days.  Energy low.  Body aches started 2-3 days ago. Has not checked her temperature. Has felt tight in her chest for the past 2-3 days.  If she takes a deep breath, triggers her cough. This evening, she had a coughing episode where she coughed so hard she vomited. Sick contacts: partner was ill initially though she is better now. Has tried Mucinex which was helpful initially, though is no longer helping.  Nonsmoker.  Denies history of asthma. No flu shot this year.     Denies shortness of breath.     Provider was masked throughout visit.     Patient Active Problem List   Diagnosis     Obesity     Hypothyroidism     Cholelithiasis     Acute Pancreatitis     Menopause Has Occurred      Vitamin D Deficiency     Diabetes mellitus       History   Smoking Status     Former Smoker   Smokeless Tobacco     Never Used     Comment: quit in 1983       Current Medications:  Current Outpatient Prescriptions on File Prior to Visit   Medication Sig Dispense Refill     levothyroxine (SYNTHROID, LEVOTHROID) 175 MCG tablet TAKE 1 TABLET BY MOUTH EVERY DAY 90 tablet 2     simvastatin (ZOCOR) 10 MG tablet Take 1 tablet (10 mg total) by mouth bedtime. 90 tablet 2     blood sugar diagnostic Strp Use 1 strip As Directed daily. 50 strip 1     levothyroxine (SYNTHROID) 200 MCG tablet Take 1 tablet (200 mcg total) by mouth daily. 90 tablet 0     No current facility-administered medications on file prior to visit.        Allergies:   No Known Allergies    OBJECTIVE:   Vitals:    02/15/17 1813   BP: 126/78   Pulse: 80   Resp: 16   Temp: 98.6  F (37  C)   TempSrc: Oral   SpO2: 97%   Weight: (!) 238 lb 6.4 oz (108.1 kg)     Physical exam reveals a pleasant 61 y.o. female.   Appears healthy, alert, cooperative and in NAD.  Eyes:  No conjunctivitis, lids normal.   Ears:  normal TMs bilaterally  Nose:    Mucosa normal. Thick yellow rhinorrhea. Bilateral maxillary sinus tenderness.   Mouth:  Mucosa pink and moist.  no pharyngitis, no exudate and thick postnasal drainage visualized in posterior pharynx. Uvula is midline.    Lymph: no cervical LAD  Lungs: normal respiratory effort.  Lungs with fine expiratory wheezes at bilateral lung bases.  No rales or rhonchi.  No use of accessory muscles. Bronchospastic cough with deep inspiration on exam.  Heart: regular rate and rhythm, no murmur, rub or gallop  Skin: pink, warm, dry, and without lesions on limited skin exam.       Pt given albuterol nebulizer in clinic.  Reassessment after nebulizer given reveals better air entry, wheezing has resolved. Patient reports marked improvement in symptoms. Able to take deep breath without coughing.

## 2021-06-08 NOTE — TELEPHONE ENCOUNTER
Refill Approved    Rx renewed per Medication Renewal Policy. Medication was last renewed on 9/29/19.    Tonya Chatman, [facility] Triage/Med Refill 5/11/2020     Requested Prescriptions   Pending Prescriptions Disp Refills     simvastatin (ZOCOR) 10 MG tablet [Pharmacy Med Name: SIMVASTATIN  10MG  TAB] 90 tablet 1     Sig: TAKE 1 TABLET BY MOUTH AT  BEDTIME       Statins Refill Protocol (Hmg CoA Reductase Inhibitors) Passed - 5/8/2020  9:11 PM        Passed - PCP or prescribing provider visit in past 12 months      Last office visit with prescriber/PCP: 10/25/2019 Racquel Dunn CNP OR same dept: 10/25/2019 Racquel Dunn CNP OR same specialty: 10/25/2019 Racquel Dunn CNP  Last physical: 4/11/2019 Last MTM visit: Visit date not found   Next visit within 3 mo: Visit date not found  Next physical within 3 mo: Visit date not found  Prescriber OR PCP: Racquel Dunn CNP  Last diagnosis associated with med order: 1. Mixed hyperlipidemia  - simvastatin (ZOCOR) 10 MG tablet [Pharmacy Med Name: SIMVASTATIN  10MG  TAB]; TAKE 1 TABLET BY MOUTH AT  BEDTIME  Dispense: 90 tablet; Refill: 1    If protocol passes may refill for 12 months if within 3 months of last provider visit (or a total of 15 months).

## 2021-06-09 ENCOUNTER — COMMUNICATION - HEALTHEAST (OUTPATIENT)
Dept: FAMILY MEDICINE | Facility: CLINIC | Age: 66
End: 2021-06-09

## 2021-06-09 ENCOUNTER — RECORDS - HEALTHEAST (OUTPATIENT)
Dept: ADMINISTRATIVE | Facility: CLINIC | Age: 66
End: 2021-06-09

## 2021-06-09 DIAGNOSIS — E03.9 HYPOTHYROIDISM, UNSPECIFIED TYPE: ICD-10-CM

## 2021-06-09 DIAGNOSIS — E78.2 MIXED HYPERLIPIDEMIA: ICD-10-CM

## 2021-06-09 RX ORDER — SIMVASTATIN 10 MG
10 TABLET ORAL AT BEDTIME
Qty: 90 TABLET | Refills: 3 | Status: SHIPPED | OUTPATIENT
Start: 2021-06-09 | End: 2021-08-18

## 2021-06-09 RX ORDER — LEVOTHYROXINE SODIUM 175 UG/1
175 TABLET ORAL DAILY
Qty: 90 TABLET | Refills: 2 | Status: SHIPPED | OUTPATIENT
Start: 2021-06-09 | End: 2021-08-18

## 2021-06-11 NOTE — TELEPHONE ENCOUNTER
Who is calling:  Patient   Reason for Call:  Patient states she need to follow up with provider for her thyroid labs and A1C labs which is due . Patient is asking whether she need to come in for in person appointment or video appointment. . Please advise .   Date of last appointment with primary care: 10/25/19  Okay to leave a detailed message: No

## 2021-06-11 NOTE — TELEPHONE ENCOUNTER
Left message to call back for: appointment question  Information to relay to patient:  Below message. Please help arrange a virtual visit or office visit per patient preference. .

## 2021-06-11 NOTE — PROGRESS NOTES
Assessment and Plan:    1. Routine general medical examination at a health care facility  Discussed consuming a healthy diet and exercising.  Discussed importance of routine sunscreen.  Discussed adequate calcium and vitamin D intake.  She is due for mammogram and colonoscopy.  She is following up for fasting labs next week.  - Mammo Screening Bilateral; Future  - Lipid Cascade; Future  - Hemoglobin; Future  - Ambulatory referral for Colonoscopy    2. Diabetes mellitus  This is diet controlled.  She is following up for labs in 2 weeks. She is to continue consuming a healthy diet and exercising.  I encouraged weight loss.   - Glycosylated Hemoglobin A1c; Future  - Microalbumin, Random Urine; Future    3. Hypothyroidism, unspecified type  This is uncontrolled.  She did not follow-up for repeat lab.  She continues Levothyroxine. Will notify patient of results.  - Thyroid Cascade; Future    4. Obesity  The following high BMI interventions were performed this visit: exercise promotion: strength training, exercise promotion: stretching and nutrition therapy    5. Medication management  - Comprehensive Metabolic Panel; Future    6. Edema, unspecified type  Will discontinue lisinopril and start lisinopril-hydrochlorothiazide 10-12 0.5 mg 1 tablet daily.  Patient will follow-up for blood pressure and electrolyte recheck in 2 weeks.  - lisinopril-hydrochlorothiazide (PRINZIDE,ZESTORETIC) 10-12.5 mg per tablet; Take 1 tablet by mouth daily.  Dispense: 30 tablet; Refill: 0    7. Bilateral impacted cerumen  Discussed symptomatic treatment with Debrox drops in the future.    8. Ganglion cyst  Discussed symptomatic treatment.  If it increases in size or becomes more painful, will refer to Ortho specialist.  She is content with the plan.    Subjective:     Korin is a 62 y.o. female presenting to the clinic for a female physical.     LMP: 5 years ago   Hx of abnormal pap smear: none  Last pap smear: 4/23/13   Perform self-breast  exams: yes  Vaginal discharge or irritation: none  Sexually active: in relationship with partner for over 20 years   Contraception: none  Concerns for STDs: none  Previous pregnancies:none  Adopted two boys ages 15 and 13.     Patient has hypothyroidism and takes levothyroxine 175 mcg daily.  Last TSH was 12.27 on 6/29/16.  She has diabetes with her last hemoglobin A1c being 6.6% on 6/29/16.  She does not check her blood sugars.  Her last eye exam was 1 year ago.  She denies any sores in her feet.  She is consuming a healthy diet and walks for exercise.  She is taking simvastatin for hyperlipidemia.  She presents today for [facility] [place] physical.  She has other concerns.  She is concerned of bilateral ear fullness for 1 month.  She denies ear pain or pressure.  She has noticed some drainage from the ear.  She denies any recent swimming.  She denies recent cold symptoms.  Patient is also concerned of lower extremity edema.  She has a new job where she is sitting for most of the day.  She has noticed some swelling in her lower extremities over the past 6 weeks.  She feels as though she is retaining fluid.  Complains of a tight sensation within her shins.  She denies any recent travel.  She denies personal or family history of blood clots.  Patient is also concerned of right hand third finger swelling.  She noticed a lump along the joint 2 weeks ago after hitting her hand against the wall.  States it is still mildly painful.  She has full range of motion of her hand.    Review of systems:  I performed a 10 point review of systems.  All pertinent positives and negatives are noted in the HPI. All others are negative.     No Known Allergies    Current Outpatient Prescriptions on File Prior to Visit   Medication Sig Dispense Refill     levothyroxine (SYNTHROID, LEVOTHROID) 175 MCG tablet TAKE 1 TABLET BY MOUTH EVERY DAY 90 tablet 2     lisinopril (PRINIVIL,ZESTRIL) 10 MG tablet   2     albuterol (PROVENTIL HFA;VENTOLIN  HFA) 90 mcg/actuation inhaler Inhale 2 puffs every 4 (four) hours as needed for wheezing. 1 Inhaler 0     blood sugar diagnostic Strp Use 1 strip As Directed daily. 50 strip 1     [] simvastatin (ZOCOR) 10 MG tablet Take 1 tablet (10 mg total) by mouth bedtime. 90 tablet 2     No current facility-administered medications on file prior to visit.        Social History     Social History     Marital status: Single     Spouse name: N/A     Number of children: N/A     Years of education: N/A     Occupational History     Not on file.     Social History Main Topics     Smoking status: Former Smoker     Smokeless tobacco: Never Used      Comment: quit in      Alcohol use Yes      Comment: couple per week     Drug use: No     Sexual activity: Not on file     Other Topics Concern     Not on file     Social History Narrative       No past medical history on file.    Family History   Problem Relation Age of Onset     Heart disease Mother      Diabetes Mother      Hypertension Father      Atrial fibrillation Father        Past Surgical History:   Procedure Laterality Date     CHOLECYSTECTOMY       MT APPENDECTOMY,RUPT APPENDX+ABSCESS      Description: Appendectomy For Ruptured Appendix;  Proc Date: 10/01/2005;     MT KNEE SCOPE,DIAGNOSTIC      Description: Arthroscopy Knee Left;  Recorded: 08/15/2007;     MT KNEE SCOPE,DIAGNOSTIC      Description: Arthroscopy Knee Left;  Proc Date: 2007;     MT REDUCTION OF LARGE BREAST      Description: Breast Surgery Reduction Procedure Bilateral;  Recorded: 08/15/2007;     MT SHLDR ARTHROSCOP,DIAGNOSTIC      Description: Arthroscopy Shoulder Right;  Recorded: 08/15/2007;  Comments: w/decompression     MT STAPEDECTOMY      Description: Stapedectomy;  Recorded: 08/15/2007;  Comments: assoc hearing loss---uses aid       Objective:     Vitals:    17 1332   BP: 120/68   Pulse: 75   SpO2: 94%       Patient is alert, no obvious distress.   Skin: Warm, dry.  No rashes or  lesions. Skin turgor rapid return.   HEENT:  Eyes normal. Both ears have mild cerumen which I removed with a curet.  TM is normal.  No erythema or effusions present.  Nose patent, mucosa pink.  Oropharynx mucosa pink, no lesions or tonsil enlargement.   Neck:  Supple, without lymphadenopathy, bruits, JVD. Thyroid normal texture and size.    Lungs:  Clear to auscultation.  No wheezing, rales noted.  Respirations even and unlabored.   Heart:  Regular rate and rhythm.  No murmurs.   Breasts:  Normal.  No surrounding adenopathy.   Abdomen: Soft, nontender.  No organomegaly.  Bowel sounds normoactive.  No guarding or masses noted.   :  deferred   Musculoskeletal:  Full ROM of extremities.  Muscle strength equal +5/5. She has +1 pitting edema present in bilateral lower extremities.  There is no erythema, ecchymosis, Homans sign is negative.  She is nontender to palpation.  She has full range of motion of her right hand third finger.  There is a small pea size ganglion cyst palpated near her third MCP.  Neurological:  Cranial nerves 2-12 intact.

## 2021-06-11 NOTE — TELEPHONE ENCOUNTER
Patient was due for a follow-up office visit for DM check on 4/25/2020.  Given she will need lab work done do you want to see her in clinic or complete a virtual visit?

## 2021-06-11 NOTE — TELEPHONE ENCOUNTER
Left message to call back for: appointment   Information to relay to patient:  Please help arrange a fasting lab only appointment and either a virtual visit or an office visit.

## 2021-06-11 NOTE — TELEPHONE ENCOUNTER
I am fine with whatever she is comfortable with.  If she chooses the virtual option, I will still need her to come in for labs, but she won't be in the clinic for as long.  Thanks.

## 2021-06-12 NOTE — TELEPHONE ENCOUNTER
Refill Approved    Rx renewed per Medication Renewal Policy. Medication was last renewed on 12/3/19.5/11/20.    Tonya Chatman, [facility] Triage/Med Refill 11/9/2020     Requested Prescriptions   Pending Prescriptions Disp Refills     simvastatin (ZOCOR) 10 MG tablet [Pharmacy Med Name: SIMVASTATIN  10MG  TAB] 90 tablet 3     Sig: TAKE 1 TABLET BY MOUTH AT  BEDTIME       Statins Refill Protocol (Hmg CoA Reductase Inhibitors) Passed - 11/6/2020  9:52 PM        Passed - PCP or prescribing provider visit in past 12 months      Last office visit with prescriber/PCP: 11/6/2020 Racquel Dunn CNP OR same dept: 11/6/2020 Racquel Dunn CNP OR same specialty: 11/6/2020 Racquel Dunn CNP  Last physical: 4/11/2019 Last MTM visit: Visit date not found   Next visit within 3 mo: Visit date not found  Next physical within 3 mo: Visit date not found  Prescriber OR PCP: Racquel Dunn CNP  Last diagnosis associated with med order: 1. Mixed hyperlipidemia  - simvastatin (ZOCOR) 10 MG tablet [Pharmacy Med Name: SIMVASTATIN  10MG  TAB]; TAKE 1 TABLET BY MOUTH AT  BEDTIME  Dispense: 90 tablet; Refill: 3    2. Diabetes mellitus, type 2 (H)  - metFORMIN (GLUCOPHAGE) 500 MG tablet [Pharmacy Med Name: MetFORMIN 500MG TABLET]; TAKE 2 TABLETS BY MOUTH TWO TIMES DAILY WITH MEALS  Dispense: 360 tablet; Refill: 3    3. Edema, unspecified type  - lisinopriL-hydrochlorothiazide (PRINZIDE,ZESTORETIC) 10-12.5 mg per tablet [Pharmacy Med Name: LISINOPRIL/HCTZ 10-12.5MG TABLET]; Take 1 tablet by mouth daily.  Dispense: 90 tablet; Refill: 3    4. Hypothyroidism, unspecified type  - levothyroxine (SYNTHROID, LEVOTHROID) 200 MCG tablet [Pharmacy Med Name: LEVOTHYROXINE  200MCG  TAB]; TAKE 1 TABLET BY MOUTH  DAILY  Dispense: 90 tablet; Refill: 3    If protocol passes may refill for 12 months if within 3 months of last provider visit (or a total of 15 months).                metFORMIN (GLUCOPHAGE) 500 MG tablet [Pharmacy Med Name: MetFORMIN 500MG  TABLET] 360 tablet 3     Sig: TAKE 2 TABLETS BY MOUTH TWO TIMES DAILY WITH MEALS       Metformin Refill Protocol Passed - 11/6/2020  9:52 PM        Passed - Blood pressure in last 12 months     BP Readings from Last 1 Encounters:   11/06/20 110/62             Passed - LFT or AST or ALT in last 12 months     Albumin   Date Value Ref Range Status   11/03/2020 3.9 3.5 - 5.0 g/dL Final     Bilirubin, Total   Date Value Ref Range Status   11/03/2020 0.5 0.0 - 1.0 mg/dL Final     Bilirubin, Direct   Date Value Ref Range Status   08/16/2013 <0.1 <0.6 mg/dL Final     Alkaline Phosphatase   Date Value Ref Range Status   11/03/2020 66 45 - 120 U/L Final     AST   Date Value Ref Range Status   11/03/2020 21 0 - 40 U/L Final     ALT   Date Value Ref Range Status   11/03/2020 35 0 - 45 U/L Final     Protein, Total   Date Value Ref Range Status   11/03/2020 6.5 6.0 - 8.0 g/dL Final                Passed - GFR or Serum Creatinine in last 6 months     GFR MDRD Non Af Amer   Date Value Ref Range Status   11/03/2020 >60 >60 mL/min/1.73m2 Final     GFR MDRD Af Amer   Date Value Ref Range Status   11/03/2020 >60 >60 mL/min/1.73m2 Final             Passed - Visit with PCP or prescribing provider visit in last 6 months or next 3 months     Last office visit with prescriber/PCP: 11/6/2020 OR same dept: 11/6/2020 Racquel Dunn CNP OR same specialty: 11/6/2020 Racquel Dunn CNP Last physical: Visit date not found Last MTM visit: Visit date not found         Next appt within 3 mo: Visit date not found  Next physical within 3 mo: Visit date not found  Prescriber OR PCP: Racuqel Dunn CNP  Last diagnosis associated with med order: 1. Mixed hyperlipidemia  - simvastatin (ZOCOR) 10 MG tablet [Pharmacy Med Name: SIMVASTATIN  10MG  TAB]; TAKE 1 TABLET BY MOUTH AT  BEDTIME  Dispense: 90 tablet; Refill: 3    2. Diabetes mellitus, type 2 (H)  - metFORMIN (GLUCOPHAGE) 500 MG tablet [Pharmacy Med Name: MetFORMIN 500MG TABLET]; TAKE 2 TABLETS BY  MOUTH TWO TIMES DAILY WITH MEALS  Dispense: 360 tablet; Refill: 3    3. Edema, unspecified type  - lisinopriL-hydrochlorothiazide (PRINZIDE,ZESTORETIC) 10-12.5 mg per tablet [Pharmacy Med Name: LISINOPRIL/HCTZ 10-12.5MG TABLET]; Take 1 tablet by mouth daily.  Dispense: 90 tablet; Refill: 3    4. Hypothyroidism, unspecified type  - levothyroxine (SYNTHROID, LEVOTHROID) 200 MCG tablet [Pharmacy Med Name: LEVOTHYROXINE  200MCG  TAB]; TAKE 1 TABLET BY MOUTH  DAILY  Dispense: 90 tablet; Refill: 3     If protocol passes may refill for 12 months if within 3 months of last provider visit (or a total of 15 months).           Passed - A1C in last 6 months     Hemoglobin A1c   Date Value Ref Range Status   11/03/2020 7.6 (H) <=5.6 % Final     Comment:     Normal <5.7% Prediabete 5.7-6.4% Diabletes 6.5% or higher - adopted from ADA consensus guidelines               Passed - Microalbumin in last year      Microalbumin, Random Urine   Date Value Ref Range Status   11/03/2020 1.84 0.00 - 1.99 mg/dL Final                     lisinopriL-hydrochlorothiazide (PRINZIDE,ZESTORETIC) 10-12.5 mg per tablet [Pharmacy Med Name: LISINOPRIL/HCTZ 10-12.5MG TABLET] 90 tablet 3     Sig: TAKE 1 TABLET BY MOUTH  DAILY       Diuretics/Combination Diuretics Refill Protocol  Passed - 11/6/2020  9:52 PM        Passed - Visit with PCP or prescribing provider visit in past 12 months     Last office visit with prescriber/PCP: 11/6/2020 Racquel Dunn CNP OR same dept: 11/6/2020 Racquel Dunn CNP OR same specialty: 11/6/2020 Racquel Dunn CNP  Last physical: 4/11/2019 Last MTM visit: Visit date not found   Next visit within 3 mo: Visit date not found  Next physical within 3 mo: Visit date not found  Prescriber OR PCP: Racquel Dunn CNP  Last diagnosis associated with med order: 1. Mixed hyperlipidemia  - simvastatin (ZOCOR) 10 MG tablet [Pharmacy Med Name: SIMVASTATIN  10MG  TAB]; TAKE 1 TABLET BY MOUTH AT  BEDTIME  Dispense: 90 tablet; Refill:  3    2. Diabetes mellitus, type 2 (H)  - metFORMIN (GLUCOPHAGE) 500 MG tablet [Pharmacy Med Name: MetFORMIN 500MG TABLET]; TAKE 2 TABLETS BY MOUTH TWO TIMES DAILY WITH MEALS  Dispense: 360 tablet; Refill: 3    3. Edema, unspecified type  - lisinopriL-hydrochlorothiazide (PRINZIDE,ZESTORETIC) 10-12.5 mg per tablet [Pharmacy Med Name: LISINOPRIL/HCTZ 10-12.5MG TABLET]; Take 1 tablet by mouth daily.  Dispense: 90 tablet; Refill: 3    4. Hypothyroidism, unspecified type  - levothyroxine (SYNTHROID, LEVOTHROID) 200 MCG tablet [Pharmacy Med Name: LEVOTHYROXINE  200MCG  TAB]; TAKE 1 TABLET BY MOUTH  DAILY  Dispense: 90 tablet; Refill: 3    If protocol passes may refill for 12 months if within 3 months of last provider visit (or a total of 15 months).             Passed - Serum Potassium in past 12 months      Lab Results   Component Value Date    Potassium 4.7 11/03/2020             Passed - Serum Sodium in past 12 months      Lab Results   Component Value Date    Sodium 141 11/03/2020             Passed - Blood pressure on file in past 12 months     BP Readings from Last 1 Encounters:   11/06/20 110/62             Passed - Serum Creatinine in past 12 months      Creatinine   Date Value Ref Range Status   11/03/2020 0.79 0.60 - 1.10 mg/dL Final                levothyroxine (SYNTHROID, LEVOTHROID) 200 MCG tablet [Pharmacy Med Name: LEVOTHYROXINE  200MCG  TAB] 90 tablet 3     Sig: TAKE 1 TABLET BY MOUTH  DAILY       Thyroid Hormones Protocol Passed - 11/6/2020  9:52 PM        Passed - Provider visit in past 12 months or next 3 months     Last office visit with prescriber/PCP: 11/6/2020 Racquel Dunn CNP OR same dept: 11/6/2020 Racquel Dunn CNP OR same specialty: 11/6/2020 Racquel Dunn CNP  Last physical: 4/11/2019 Last MTM visit: Visit date not found   Next visit within 3 mo: Visit date not found  Next physical within 3 mo: Visit date not found  Prescriber OR PCP: Racquel Dunn CNP  Last diagnosis associated with  med order: 1. Mixed hyperlipidemia  - simvastatin (ZOCOR) 10 MG tablet [Pharmacy Med Name: SIMVASTATIN  10MG  TAB]; TAKE 1 TABLET BY MOUTH AT  BEDTIME  Dispense: 90 tablet; Refill: 3    2. Diabetes mellitus, type 2 (H)  - metFORMIN (GLUCOPHAGE) 500 MG tablet [Pharmacy Med Name: MetFORMIN 500MG TABLET]; TAKE 2 TABLETS BY MOUTH TWO TIMES DAILY WITH MEALS  Dispense: 360 tablet; Refill: 3    3. Edema, unspecified type  - lisinopriL-hydrochlorothiazide (PRINZIDE,ZESTORETIC) 10-12.5 mg per tablet [Pharmacy Med Name: LISINOPRIL/HCTZ 10-12.5MG TABLET]; Take 1 tablet by mouth daily.  Dispense: 90 tablet; Refill: 3    4. Hypothyroidism, unspecified type  - levothyroxine (SYNTHROID, LEVOTHROID) 200 MCG tablet [Pharmacy Med Name: LEVOTHYROXINE  200MCG  TAB]; TAKE 1 TABLET BY MOUTH  DAILY  Dispense: 90 tablet; Refill: 3    If protocol passes may refill for 12 months if within 3 months of last provider visit (or a total of 15 months).             Passed - TSH on file in past 12 months for patient age 12 & older     TSH   Date Value Ref Range Status   11/03/2020 0.15 (L) 0.30 - 5.00 uIU/mL Final

## 2021-06-12 NOTE — TELEPHONE ENCOUNTER
Received MTM referral from provider     Patient was not reachable after several attempts, will route to MTM Pharmacist/Provider as an FYI. Left MTM scheduling information on patients voicemail.    Thank you for the referral,    Jailene Sethi, MTM Coordinator

## 2021-06-12 NOTE — TELEPHONE ENCOUNTER
Due to be seen    Rx renewed per Medication Renewal Policy. Medication was last renewed on 5/11/20.    Tonya Chatman, [facility] Triage/Med Refill 10/23/2020     Requested Prescriptions   Pending Prescriptions Disp Refills     simvastatin (ZOCOR) 10 MG tablet [Pharmacy Med Name: SIMVASTATIN  10MG  TAB] 90 tablet 3     Sig: TAKE 1 TABLET BY MOUTH AT  BEDTIME       Statins Refill Protocol (Hmg CoA Reductase Inhibitors) Passed - 10/21/2020 10:10 PM        Passed - PCP or prescribing provider visit in past 12 months      Last office visit with prescriber/PCP: 10/25/2019 Racquel Dunn CNP OR same dept: 10/25/2019 Racquel Dunn CNP OR same specialty: 10/25/2019 Racquel Dunn CNP  Last physical: 4/11/2019 Last MTM visit: Visit date not found   Next visit within 3 mo: Visit date not found  Next physical within 3 mo: Visit date not found  Prescriber OR PCP: Racquel Dunn CNP  Last diagnosis associated with med order: 1. Mixed hyperlipidemia  - simvastatin (ZOCOR) 10 MG tablet [Pharmacy Med Name: SIMVASTATIN  10MG  TAB]; TAKE 1 TABLET BY MOUTH AT  BEDTIME  Dispense: 90 tablet; Refill: 3    If protocol passes may refill for 12 months if within 3 months of last provider visit (or a total of 15 months).

## 2021-06-12 NOTE — PROGRESS NOTES
Assessment and Plan:     1. Type 2 diabetes mellitus without complication, without long-term current use of insulin (H)  This is uncontrolled.  Patient feels as though glipizide causes her to experience hypoglycemia.  Will refer to pharmacist to discuss possible Victoza initiation.  She continues metformin 1000 mg twice daily.  - Ambulatory referral to Medication Management    2. Vitamin D deficiency  Patient has a history of vitamin D deficiency and would like this checked.  She is to follow-up for labs in 8 to 9 weeks, so we will have this checked then.  - Vitamin D, Total (25-Hydroxy); Future    3. Hypothyroidism, unspecified type  TSH is low.  Will decrease levothyroxine to 175 mcg daily.  Recommend lab recheck in 8 to 9 weeks.  - Thyroid Cascade; Future  - levothyroxine (SYNTHROID, LEVOTHROID) 175 MCG tablet; Take 1 tablet (175 mcg total) by mouth daily.  Dispense: 90 tablet; Refill: 0    4. Hypertriglyceridemia  Patient continues simvastatin.  Goal LDL less than 100.  - Lipid Cascade; Future    5. Tinnitus, left  Will refer to ENT for further evaluation.  - Ambulatory referral to ENT    6. Impacted cerumen of left ear  Recommend using over-the-counter Debrox drops as needed in the future.    7. Chronic congestion of paranasal sinus  Will refer to ENT per patient request.    8. Health care maintenance  - Pneumococcal polysaccharide vaccine 23-valent greater than or equal to 1yo subcutaneous/IM    9. Class 2 severe obesity due to excess calories with serious comorbidity and body mass index (BMI) of 37.0 to 37.9 in adult (H)  The following high BMI interventions were performed this visit: encouragement to exercise and lifestyle education regarding diet    The patient is content with the plan and will follow-up in 6 months for medication management or sooner with any further concerns.       Subjective:     Korin is a 65 y.o. female presenting to the clinic for medication management.  Patient has multiple  "comorbidities including type 2 diabetes, hypertension, hypertriglyceridemia, obesity, hypothyroidism.  Patient presented for labs on 11/3/2020 where her hemoglobin A1c was 7.6%, TSH was 1.15.  Patient is taking metformin 1000 mg twice daily.  She is prescribed glipizide 5 mg XL, but only takes it when her blood sugars are elevated.  She is concerned as she has noticed a fluctuation in blood sugars.  Her last eye exam was 1 to 2 years ago.  She denies any sores on her feet.  She is consuming a healthy diet.  She walks for exercise.  Patient continues simvastatin 10 mg daily.  She has hypertension which is controlled with lisinopril-hydrochlorothiazide 10-12.5 mg daily.  She is currently taking levothyroxine 200 mcg daily for hypothyroidism.  Patient is concerned of left ear fullness and pressure for 1 year.  She hears a \"white noise\" which is constant.  She has a history of stapes surgery.  Patient is concerned sinus congestion within her right nostril.  She has a history of facial trauma.  Sinus congestion is constant with warmer weather.  She is interested in seeing an ear nose and throat specialist.  Patient has had a rash present on the dorsal aspect of her left wrist for 2 to 3 months.  She complains of a dryness.  She has not tried any over-the-counter products for symptoms.  No itching has been present. She has a history of Vitamin D deficiency and is taking supplementation.  She is unsure of the dose.     Review of Systems: A complete 14 point review of systems was obtained and is negative or as stated in the history of present illness.    Social History     Socioeconomic History     Marital status: Single     Spouse name: Not on file     Number of children: Not on file     Years of education: Not on file     Highest education level: Not on file   Occupational History     Not on file   Social Needs     Financial resource strain: Not on file     Food insecurity     Worry: Not on file     Inability: Not on file " "    Transportation needs     Medical: Not on file     Non-medical: Not on file   Tobacco Use     Smoking status: Former Smoker     Smokeless tobacco: Never Used     Tobacco comment: quit in 1983   Substance and Sexual Activity     Alcohol use: Yes     Comment: couple per week     Drug use: No     Sexual activity: Not on file   Lifestyle     Physical activity     Days per week: Not on file     Minutes per session: Not on file     Stress: Not on file   Relationships     Social connections     Talks on phone: Not on file     Gets together: Not on file     Attends Protestant service: Not on file     Active member of club or organization: Not on file     Attends meetings of clubs or organizations: Not on file     Relationship status: Not on file     Intimate partner violence     Fear of current or ex partner: Not on file     Emotionally abused: Not on file     Physically abused: Not on file     Forced sexual activity: Not on file   Other Topics Concern     Not on file   Social History Narrative     Not on file       Active Ambulatory Problems     Diagnosis Date Noted     Obesity      Hypothyroidism      Cholelithiasis      Acute Pancreatitis      Menopause Has Occurred      Vitamin D Deficiency      Diabetes mellitus (H) 06/29/2015     Obesity (BMI 35.0-39.9) with comorbidity (H) 09/17/2018     Benign essential hypertension 11/15/2018     Hypertriglyceridemia 04/11/2019     Resolved Ambulatory Problems     Diagnosis Date Noted     Atypical Chest Pain      Prediabetes      Joint Pain Fingers      Arthropathy Of The Ankle / Foot      Midback Pain      No Additional Past Medical History       Family History   Problem Relation Age of Onset     Heart disease Mother      Diabetes Mother      Hypertension Father      Atrial fibrillation Father        Objective:     /62 (Patient Site: Left Arm, Patient Position: Sitting, Cuff Size: Adult Large)   Pulse 78   Ht 5' 5.75\" (1.67 m)   Wt (!) 225 lb (102.1 kg)   SpO2 97%   " BMI 36.59 kg/m      Patient is alert, in no obvious distress.   Skin: Warm, dry.  No lesions or rashes.  Skin turgor rapid return.   HEENT:  Head normocephalic, atraumatic.  Eyes normal.  Right ear is normal.  Left ear is impacted with cerumen.  I removed a small amount with a curette.  Ear lavage was performed.  After ear lavage, ear appears normal.  Nose patent, mucosa pink.  Oropharynx mucosa pink.  No lesions or tonsillar enlargement.   Neck: Supple, no lymphadenopathy.   No thyromegaly.  Lungs:  Clear to auscultation. Respirations even and unlabored.  No wheezing or rales noted.   Heart:  Regular rate and rhythm.  No murmurs.     Musculoskeletal: No edema is present in bilateral lower extremities.

## 2021-06-12 NOTE — TELEPHONE ENCOUNTER
RN cannot approve Refill Request    RN can NOT refill this medication Protocol failed and NO refill given. Last office visit: 10/25/2019 Racquel Dunn CNP Last Physical: 4/11/2019 Last MTM visit: Visit date not found Last visit same specialty: 10/25/2019 Racquel Dunn CNP.  Next visit within 3 mo: Visit date not found  Next physical within 3 mo: Visit date not found      Tonya Chatman, [facility] Triage/Med Refill 10/23/2020    Requested Prescriptions   Pending Prescriptions Disp Refills     metFORMIN (GLUCOPHAGE) 500 MG tablet [Pharmacy Med Name: MetFORMIN 500MG TABLET] 360 tablet 3     Sig: TAKE 2 TABLETS BY MOUTH TWO TIMES DAILY WITH MEALS       Metformin Refill Protocol Failed - 10/22/2020  3:25 AM        Failed - Visit with PCP or prescribing provider visit in last 6 months or next 3 months     Last office visit with prescriber/PCP: Visit date not found OR same dept: 10/25/2019 Racquel Dunn CNP OR same specialty: 10/25/2019 Racquel Dunn CNP Last physical: Visit date not found Last MTM visit: Visit date not found         Next appt within 3 mo: Visit date not found  Next physical within 3 mo: Visit date not found  Prescriber OR PCP: Racquel Dunn CNP  Last diagnosis associated with med order: 1. Diabetes mellitus, type 2 (H)  - metFORMIN (GLUCOPHAGE) 500 MG tablet [Pharmacy Med Name: MetFORMIN 500MG TABLET]; TAKE 2 TABLETS BY MOUTH TWO TIMES DAILY WITH MEALS  Dispense: 360 tablet; Refill: 3    2. Hypothyroidism, unspecified type  - levothyroxine (SYNTHROID, LEVOTHROID) 200 MCG tablet [Pharmacy Med Name: LEVOTHYROXINE  200MCG  TAB]; TAKE 1 TABLET BY MOUTH  DAILY  Dispense: 90 tablet; Refill: 3    3. Edema, unspecified type  - lisinopriL-hydrochlorothiazide (PRINZIDE,ZESTORETIC) 10-12.5 mg per tablet [Pharmacy Med Name: LISINOPRIL/HCTZ 10-12.5MG TABLET]; Take 1 tablet by mouth daily.  Dispense: 90 tablet; Refill: 3     If protocol passes may refill for 12 months if within 3 months of last provider  visit (or a total of 15 months).           Failed - A1C in last 6 months     Hemoglobin A1c   Date Value Ref Range Status   10/25/2019 7.4 (H) 3.5 - 6.0 % Final               Passed - Blood pressure in last 12 months     BP Readings from Last 1 Encounters:   10/25/19 108/66             Passed - LFT or AST or ALT in last 12 months     Albumin   Date Value Ref Range Status   10/25/2019 3.9 3.5 - 5.0 g/dL Final     Bilirubin, Total   Date Value Ref Range Status   10/25/2019 0.5 0.0 - 1.0 mg/dL Final     Bilirubin, Direct   Date Value Ref Range Status   08/16/2013 <0.1 <0.6 mg/dL Final     Alkaline Phosphatase   Date Value Ref Range Status   10/25/2019 70 45 - 120 U/L Final     AST   Date Value Ref Range Status   10/25/2019 25 0 - 40 U/L Final     ALT   Date Value Ref Range Status   10/25/2019 37 0 - 45 U/L Final     Protein, Total   Date Value Ref Range Status   10/25/2019 6.6 6.0 - 8.0 g/dL Final                Passed - GFR or Serum Creatinine in last 6 months     GFR MDRD Non Af Amer   Date Value Ref Range Status   10/25/2019 >60 >60 mL/min/1.73m2 Final     GFR MDRD Af Amer   Date Value Ref Range Status   10/25/2019 >60 >60 mL/min/1.73m2 Final             Passed - Microalbumin in last year      Microalbumin, Random Urine   Date Value Ref Range Status   10/25/2019 1.40 0.00 - 1.99 mg/dL Final                     levothyroxine (SYNTHROID, LEVOTHROID) 200 MCG tablet [Pharmacy Med Name: LEVOTHYROXINE  200MCG  TAB] 90 tablet 3     Sig: TAKE 1 TABLET BY MOUTH  DAILY       Thyroid Hormones Protocol Passed - 10/22/2020  3:25 AM        Passed - Provider visit in past 12 months or next 3 months     Last office visit with prescriber/PCP: 10/25/2019 Racquel Dunn CNP OR same dept: 10/25/2019 Racquel Dunn CNP OR same specialty: 10/25/2019 Racquel Dunn CNP  Last physical: 4/11/2019 Last MTM visit: Visit date not found   Next visit within 3 mo: Visit date not found  Next physical within 3 mo: Visit date not  found  Prescriber OR PCP: Racquel Dunn CNP  Last diagnosis associated with med order: 1. Diabetes mellitus, type 2 (H)  - metFORMIN (GLUCOPHAGE) 500 MG tablet [Pharmacy Med Name: MetFORMIN 500MG TABLET]; TAKE 2 TABLETS BY MOUTH TWO TIMES DAILY WITH MEALS  Dispense: 360 tablet; Refill: 3    2. Hypothyroidism, unspecified type  - levothyroxine (SYNTHROID, LEVOTHROID) 200 MCG tablet [Pharmacy Med Name: LEVOTHYROXINE  200MCG  TAB]; TAKE 1 TABLET BY MOUTH  DAILY  Dispense: 90 tablet; Refill: 3    3. Edema, unspecified type  - lisinopriL-hydrochlorothiazide (PRINZIDE,ZESTORETIC) 10-12.5 mg per tablet [Pharmacy Med Name: LISINOPRIL/HCTZ 10-12.5MG TABLET]; Take 1 tablet by mouth daily.  Dispense: 90 tablet; Refill: 3    If protocol passes may refill for 12 months if within 3 months of last provider visit (or a total of 15 months).             Passed - TSH on file in past 12 months for patient age 12 & older     TSH   Date Value Ref Range Status   10/25/2019 2.02 0.30 - 5.00 uIU/mL Final                      lisinopriL-hydrochlorothiazide (PRINZIDE,ZESTORETIC) 10-12.5 mg per tablet [Pharmacy Med Name: LISINOPRIL/HCTZ 10-12.5MG TABLET] 90 tablet 3     Sig: TAKE 1 TABLET BY MOUTH  DAILY       Diuretics/Combination Diuretics Refill Protocol  Passed - 10/22/2020  3:25 AM        Passed - Visit with PCP or prescribing provider visit in past 12 months     Last office visit with prescriber/PCP: 10/25/2019 Racquel Dunn CNP OR same dept: 10/25/2019 Racquel Dunn CNP OR same specialty: 10/25/2019 Racquel Dunn CNP  Last physical: 4/11/2019 Last MTM visit: Visit date not found   Next visit within 3 mo: Visit date not found  Next physical within 3 mo: Visit date not found  Prescriber OR PCP: Racquel Dunn CNP  Last diagnosis associated with med order: 1. Diabetes mellitus, type 2 (H)  - metFORMIN (GLUCOPHAGE) 500 MG tablet [Pharmacy Med Name: MetFORMIN 500MG TABLET]; TAKE 2 TABLETS BY MOUTH TWO TIMES DAILY WITH MEALS  Dispense:  360 tablet; Refill: 3    2. Hypothyroidism, unspecified type  - levothyroxine (SYNTHROID, LEVOTHROID) 200 MCG tablet [Pharmacy Med Name: LEVOTHYROXINE  200MCG  TAB]; TAKE 1 TABLET BY MOUTH  DAILY  Dispense: 90 tablet; Refill: 3    3. Edema, unspecified type  - lisinopriL-hydrochlorothiazide (PRINZIDE,ZESTORETIC) 10-12.5 mg per tablet [Pharmacy Med Name: LISINOPRIL/HCTZ 10-12.5MG TABLET]; Take 1 tablet by mouth daily.  Dispense: 90 tablet; Refill: 3    If protocol passes may refill for 12 months if within 3 months of last provider visit (or a total of 15 months).             Passed - Serum Potassium in past 12 months      Lab Results   Component Value Date    Potassium 4.3 10/25/2019             Passed - Serum Sodium in past 12 months      Lab Results   Component Value Date    Sodium 142 10/25/2019             Passed - Blood pressure on file in past 12 months     BP Readings from Last 1 Encounters:   10/25/19 108/66             Passed - Serum Creatinine in past 12 months      Creatinine   Date Value Ref Range Status   10/25/2019 0.82 0.60 - 1.10 mg/dL Final

## 2021-06-13 NOTE — PROGRESS NOTES
"CHIEF COMPLAINT:   Chief Complaint   Patient presents with     Tinnitus     Constant static, ringing comes and goes, L side only for years     Ear Fullness     L ear, comes and goes for years         HISTORY OF PRESENT ILLNESS    Korin was seen at the behest of Racquel Dunn for tinnitus and hearing loss. She was seen 3 years ago at [facility] 3 years ago for \"static\" in the left ear.  It is constant.   It can make it difficult to sleep at night. Currently it is a 7/10.   Some difficulty hearing in the left ear.   She wears a hearing in the left ear which has not helped with the statis.   She is good about wearing the hearing aid.  No dizziness or vertigo.   History of stapedectomy on the left ear 30  years ago (done at [facility].   The surgery did improve the hearing in the ear.  She gets fullness and pressure in the left ear that will last 5 minutes at a time.  Prior audiogram from [facility] dated 6/28/2018 shows a downsloping sensorineural hearing loss in both ears that is moderate to severe to profound worse in the left than the right.  Hearing loss started around 2000 Hz.  There is also low-frequency hearing loss at around 45 to 50 dB in both ears at 252 tp 500 Hz      Chief Complaint   Patient presents with     Tinnitus     Constant static, ringing comes and goes, L side only for years     Ear Fullness     L ear, comes and goes for years            REVIEW OF SYSTEMS    Review of Systems: a 10-system review is reviewed at this encounter.  See scanned document.     Patient has no known allergies.       There were no vitals filed for this visit.      PHYSICAL EXAM:        HEAD: Normal appearance and symmetry:  No cutaneous lesions.      EARS:    Normal TM's bilaterally. Normal auditory canals and external ears. Non-tender.         NOSE:    Dorsum:   straight  Septum: normal  Mucosa:  moist  Inferior turbinates:  normal       ORAL CAVITY/OROPHARYNX:    Lips:  Normal.  Tongue: normal, midline  Mucosa:   no " lesions  Tonsils:  1+      NECK:  Trachea:  midline.   Thyroid:  normal   Adenopathy:  none       NEURO:   Alert and Oriented    GAIT AND STATION:  normal     RESPIRATORY:   Symmetry and Respiratory effort    PSYCH:   normal mood and affect    SKIN:  warm and dry         IMPRESSION:    Encounter Diagnoses   Name Primary?     Tinnitus, left Yes     SNHL (sensory-neural hearing loss), asymmetrical      History of use of hearing aid in left ear      Post-nasal drip           RECOMMENDATIONS:      Orders Placed This Encounter   Procedures     Ambulatory referral to Audiology     Referral Priority:   Routine     Referral Type:   Audiology     Referral Reason:   Evaluation and Treatment     Requested Specialty:   Audiology     Number of Visits Requested:   1      Medications Ordered   Medications     azelastine (ASTELIN) 137 mcg (0.1 %) nasal spray     Sig: Use in each nostril as directed     Dispense:  30 mL     Refill:  12     2 sprays each nostril 1-2x daily as needed for post nasal drip (use daily for the first 14 days)       Sinus CT ordered today  (you will be called for appointment)  Astelin nasal spray as directed for PND  Return visit 1 month to review CT images  Referral for tinnitus therapy/hearing aid evaluation placed

## 2021-06-13 NOTE — TELEPHONE ENCOUNTER
Called her pharmacy and confirmed that Trulicity was covered for $0. Needs to order for Monday.     Called Korin to let her know -- she would prefer hands on learning and would like me to go over the administration with her. I will see her Monday at [place].

## 2021-06-13 NOTE — TELEPHONE ENCOUNTER
Ozempic needs a PA. Naomi did not know if there is a preferred GLP1 agonist. Will start the PA and switch if not covered.     Please keep me posted on the status of the PA -- thanks!     Prior Authorization Request  Who s requesting:  Pharmacy  Pharmacy Name and Location: [facility]  Medication Name: Ozempic  Insurance Plan: BCBS ([place] of [place]  Insurance Member ID Number:  [id number]   CoverMyMeds Key: N/A  Informed patient that prior authorizations can take up to 10 business days for response:   Yes  Okay to leave a detailed message: Yes    Carine Landis, Pharm.D., BCACP  Medication Therapy Management Pharmacist  [facility] and [facility]

## 2021-06-13 NOTE — TELEPHONE ENCOUNTER
Will send in order for Trulicity instead.   I will check with pharmacy on coverage later in the day

## 2021-06-13 NOTE — PROGRESS NOTES
MTM Initial Encounter  Assessment & Plan                                                     Type 2 Diabetes: Needs improvement. Last A1c was not at goal <7%. Now that she is off glipizide, her BG are worse. Two hour post prandial values not at goal <180 mg/dL. Agree with starting a GLP1 agonist, especially before she hits Medicare next year. Reviewed options and she was interested in a once-weekly option therefore we elevated Ozempic. I will check insurance coverage then we will set up a time to review administration. Reviewed mechanism of action, side effects, warnings, storage, dosing. She does have a hx of pancreatitis, but it was gallbladder-related and not idiopathic. Discussed that Ozempic will be a great option to keep her BG consistent and help with weight loss.   PLAN:   1. Start Ozempic 0.25 mg weekly    Hypothyroidism: Last TSH was low and levothyroxine was adjusted - she recently restarted and plans on having level rechecked before end of the year. No symptoms of hyperthyroidism. Future order placed by Racquel.     Hypertension: BP at goal and well controlled.     Vitamin D deficiency: Last Vitamin D level was low, but old value. She is planning on getting checked with future TSH, order placed by Racquel already and will adjust supplement accordingly.     Follow Up  After Ozempic coverage     Subjective & Objective                                                     Korin Sharma is a 65 y.o. female called for an initial visit for Medication Therapy Management. She was referred to me from Racquel Dunn CNP    Patient consented to a telehealth visit: Yes    Reason for visit: Referred from Racquel to discuss diabetes, specifically stopping glipizide and starting Victoza instead.    Medication Adherence/Access: No adherence issues. Patient was familiar with her medications. Will be switching to medicare [employer] in 2021     Type 2 Diabetes: Currently taking metformin 1000 mg twice daily.  She is no longer on  glipizide XL 5 mg, was discontinued on 11/6/2020 due to hypoglycemia 80-90 and BG being up and down.  She was taking it as needed before.  Not noticed anything different since stopping, but more consistently high readings. Was referred to discuss possibly initiating Victoza. She is open and has never injected herself, but helped her mother with insulin vials.   Occasional loose stools  Normal renal function  Tests BG 1-4 times daily. Reports blood sugars up and down which is frustrating. Usually checks before bed in the evening. 150-2800. Pumpkin pie was 280. At least 2 hours have passed since eating.   Last A1c checked 11/3/2020 =7.6%.   Hypoglycemia none <70  Hyperglycemia symptoms: gets hot and knows things are off.   Microalbumin checked 11/3/2020  Continues simvastatin 10 mg HS.  Last lipids checked 9/17/2018, but a future order was placed by Racquel.  Is taking aspirin 81 mg for primary prevention.    Walks for exercise. Partner cant have sugar due to bree en y.   History of acute pancreatitis on file due to gallbladder issue which was removed. No hx thyroid cancer     Hypothyroidism: Currently taking levothyroxine 175 mcg --dose decreased from 200 mcg on 11/6/2020. just started lower dose today.  Last TSH checked 11/3/2020 = 0.15. Current or previous symptoms: none .     Hypertension: Currently taking lisinopril hydrochlorothiazide 10-12.5 mg daily. Patient does not monitor BP at home. Denies lightheadedness/dizziness.   BP Readings from Last 3 Encounters:   11/06/20 110/62   10/25/19 108/66   04/17/19 108/68       Vitamin D deficiency: Continues vitamin D 2000 units daily.  Future vitamin D level placed.  Vitamin D, Total (25-Hydroxy)   Date Value Ref Range Status   06/29/2016 23.1 (L) 30.0 - 80.0 ng/mL Final         PMH: reviewed in EPIC   Allergies/ADRs: reviewed in EPIC   Alcohol: reviewed in EPIC  Tobacco:   Social History     Tobacco Use   Smoking Status Former Smoker   Smokeless Tobacco Never Used    Tobacco Comment    quit in 1983     Today's Vitals: There were no vitals filed for this visit.  ----------------    The patient declined an after visit summary    I spent 42 minutes with this patient today.   All changes were made via collaborative practice agreement with Racquel Dunn CNP. A copy of the visit note was provided to the patient's provider.     Carine Landis, Pharm.D., [facility]  Medication Therapy Management Pharmacist  [facility]    Telemedicine Visit Details    Type of service:  Telephone     Start Time: 9:10 AM  End Time: 9:52 AM    Originating Location (pt. Location): Home    Distant Location (provider location):  [place] [facility]ICATION [facility]ERAPY MANAGEMENT [facility]    Mode of Communication: Telephone    Current Outpatient Medications   Medication Sig Dispense Refill     ACCU-CHEK RENATE PLUS TEST STRP strips USE TO TEST DAILY AS DIRECTED. 100 strip 1     aspirin 81 MG EC tablet Take 81 mg by mouth daily.       blood-glucose meter Misc Use 1 Device As Directed daily. 1 each 0     generic lancets (FINGERSTIX LANCETS) Dispense brand per patient's insurance at pharmacy discretion. 100 each 3     levothyroxine (SYNTHROID, LEVOTHROID) 175 MCG tablet Take 1 tablet (175 mcg total) by mouth daily. 90 tablet 0     levothyroxine (SYNTHROID, LEVOTHROID) 200 MCG tablet TAKE 1 TABLET BY MOUTH  DAILY 90 tablet 3     lisinopriL-hydrochlorothiazide (PRINZIDE,ZESTORETIC) 10-12.5 mg per tablet TAKE 1 TABLET BY MOUTH  DAILY 90 tablet 3     metFORMIN (GLUCOPHAGE) 500 MG tablet TAKE 2 TABLETS BY MOUTH TWO TIMES DAILY WITH MEALS 360 tablet 3     simvastatin (ZOCOR) 10 MG tablet TAKE 1 TABLET BY MOUTH AT  BEDTIME 90 tablet 3     No current facility-administered medications for this visit.

## 2021-06-14 NOTE — TELEPHONE ENCOUNTER
Spoke to Korin.  She was able to start Trulicity at the beginning of December.  Reports that it took a while for the blood sugars to stabilize, but now she is between 1 12-1 26.  One time she was 160, but she thinks it was after eating something with high carbs.  She has noticed that her appetite has decreased and she is not as hungry.  Has noticed a little weight loss.  The first few weeks she had a little bit of nausea, but it went away and she has not noticed that the last few weeks.    Her insurance will be changing at the beginning of 2021, therefore I suggested that we send a 3-month supply since she currently has no co-pay.  I recommended that she reach out to me before the end of the year if there is an issue (we will have covering [facility] pharmacist respond).  Otherwise, discussed following up at the beginning of February to set up a future A1c.  She was agreeable with this plan.    Carine Landis, Pharm.D., BCACP  Medication Therapy Management Pharmacist  [facility]

## 2021-06-14 NOTE — TELEPHONE ENCOUNTER
----- Message from Ly Kaur sent at 12/8/2020 11:08 AM CST -----  Regarding: Schedule Hearing Aid Evaluation  Hello,    Please contact this patient and schedule a HEARING AID EVALUATION with Chika Huff.    Thank you,    Paul Fisher, [facility]  Clinical Audiologist  [place] #[id number]

## 2021-06-15 NOTE — TELEPHONE ENCOUNTER
Called Korin. She is still working on her insurance stuff and has not heard anything yet. She does not need extra assistance at this time. She has 2 injections left of Trulicity at home. Unfortunately she may run out before she figures out her insurance issues, but will have her continue metformin. Provided her with Graciela's phone number again and she plans on calling to apply for free Trulicity. I will follow up with her in about a month but I encouraged her to reach out if she needs anything before then.

## 2021-06-15 NOTE — TELEPHONE ENCOUNTER
Refill Approved    Rx renewed per Medication Renewal Policy. Medication was last renewed on 12/24/20.    Dileep Camacho, [facility] Triage/Med Refill 2/8/2021     Requested Prescriptions   Pending Prescriptions Disp Refills     dulaglutide (TRULICITY) 0.75 mg/0.5 mL PnIj 6 mL 0     Sig: Inject 0.75 mg under the skin once a week.       Insulin/GLP-1 Refill Protocol Passed - 2/8/2021 10:28 AM        Passed - Visit with PCP or prescribing provider visit in last 6 months     Last office visit with prescriber/PCP: 11/6/2020 OR same dept: 11/6/2020 Racquel Dunn CNP OR same specialty: 11/6/2020 Racquel Dunn CNP Last physical: Visit date not found Last MTM visit: Visit date not found     Next appt within 3 mo: Visit date not found  Next physical within 3 mo: Visit date not found  Prescriber OR PCP: Racquel Dunn CNP  Last diagnosis associated with med order: 1. Type 2 diabetes mellitus without complication, without long-term current use of insulin (H)  - dulaglutide (TRULICITY) 0.75 mg/0.5 mL PnIj; Inject 0.75 mg under the skin once a week.  Dispense: 6 mL; Refill: 0    If protocol passes may refill for 6 months if within 3 months of last provider visit (or a total of 9 months).              Passed - A1C in last 6 months     Hemoglobin A1c   Date Value Ref Range Status   11/03/2020 7.6 (H) <=5.6 % Final     Comment:     Normal <5.7% Prediabete 5.7-6.4% Diabletes 6.5% or higher - adopted from ADA consensus guidelines               Passed - Microalbumin in last year     Microalbumin, Random Urine   Date Value Ref Range Status   11/03/2020 1.84 0.00 - 1.99 mg/dL Final                  Passed - Blood pressure in last year     BP Readings from Last 1 Encounters:   11/06/20 110/62             Passed - Creatinine done in last year     Creatinine   Date Value Ref Range Status   11/03/2020 0.79 0.60 - 1.10 mg/dL Final

## 2021-06-15 NOTE — TELEPHONE ENCOUNTER
Refill Approved    Rx renewed per Medication Renewal Policy. Medication was last renewed on 11/09/2020 for 1 year supply. [facility].    Bertha Mcdaniels, [facility] Triage/Med Refill 2/26/2021     Requested Prescriptions   Pending Prescriptions Disp Refills     levothyroxine (SYNTHROID, LEVOTHROID) 200 MCG tablet 90 tablet 3     Sig: Take 1 tablet (200 mcg total) by mouth daily.       Thyroid Hormones Protocol Passed - 2/26/2021  3:29 PM        Passed - Provider visit in past 12 months or next 3 months     Last office visit with prescriber/PCP: 11/6/2020 Racquel Dunn CNP OR same dept: 11/6/2020 Racquel Dunn CNP OR same specialty: 11/6/2020 Racquel Dunn CNP  Last physical: 4/11/2019 Last MTM visit: Visit date not found   Next visit within 3 mo: Visit date not found  Next physical within 3 mo: Visit date not found  Prescriber OR PCP: Racquel Dunn CNP  Last diagnosis associated with med order: 1. Hypothyroidism, unspecified type  - levothyroxine (SYNTHROID, LEVOTHROID) 200 MCG tablet; Take 1 tablet (200 mcg total) by mouth daily.  Dispense: 90 tablet; Refill: 3    If protocol passes may refill for 12 months if within 3 months of last provider visit (or a total of 15 months).             Passed - TSH on file in past 12 months for patient age 12 & older     TSH   Date Value Ref Range Status   12/28/2020 3.35 0.30 - 5.00 uIU/mL Final

## 2021-06-16 PROBLEM — E78.1 HYPERTRIGLYCERIDEMIA: Status: ACTIVE | Noted: 2019-04-11

## 2021-06-16 PROBLEM — I10 BENIGN ESSENTIAL HYPERTENSION: Status: ACTIVE | Noted: 2018-11-15

## 2021-06-16 PROBLEM — E66.01 MORBID OBESITY (H): Status: ACTIVE | Noted: 2018-09-17

## 2021-06-16 NOTE — PROGRESS NOTES
Assessment:     1. Cough  Influenza A/B Rapid Test    albuterol nebulizer solution 3 mL (PROVENTIL)   2. Bronchitis  azithromycin (ZITHROMAX Z-FILEMON) 250 MG tablet    albuterol (PROAIR HFA;PROVENTIL HFA;VENTOLIN HFA) 90 mcg/actuation inhaler    benzonatate (TESSALON PERLES) 100 MG capsule    albuterol nebulizer solution 3 mL (PROVENTIL)    DISCONTINUED: albuterol nebulizer solution 2.5 mg (PROVENTIL)          Plan:     Patient was given a treatment during the visit.  On reassessment she reported that she is feeling better that she is able to getting more air even though she had increased coughing.  Discussed the negative results with the patient.  We will treat patient with antibiotics given the symptoms has been going on for more than 3 weeks.  Advised patient to take medications as prescribed.  May use ibuprofen for pain or fever.  Increase fluid intake.  May drink hot tea with lemon radha and honey.  May follow-up with PCP if symptoms does not resolve after the treatment.    Subjective:       63 y.o. female presents for evaluation of a cough that has been persisting for about 3 weeks now.  Patient reports that she was seen about 9 days ago where she was given some cough medicine and she also has been using decongestant OTC without significant relief.  She reports that her chest feels full, no difficulty with breathing but she has chest heaviness.  She denies a fever, she reports a decreased appetite, and increased fatigue.  She denies vomiting, diarrhea, or fever.    The following portions of the patient's history were reviewed and updated as appropriate: allergies, current medications, past family history, past medical history, past social history, past surgical history and problem list.    Review of Systems  A 12 point comprehensive review of systems was negative except as noted.     Objective:      /80 (Patient Site: Right Arm, Patient Position: Sitting, Cuff Size: Adult Large)  Pulse 73  Temp 98.2  F  (36.8  C) (Oral)   Resp 18  Wt (!) 238 lb 14.4 oz (108.4 kg)  SpO2 97%  BMI 39.76 kg/m2  General appearance: alert, appears stated age, cooperative and moderate distress  Head: Normocephalic, without obvious abnormality, atraumatic, sinuses nontender to percussion  Eyes: conjunctivae/corneas clear. PERRL, EOM's intact. Fundi benign.  Ears: normal TM's and external ear canals both ears  Nose: Nares normal. Septum midline. Mucosa normal. No drainage or sinus tenderness., no congestion, no sinus tenderness  Throat: lips, mucosa, and tongue normal; teeth and gums normal and postnasal drainage.  Lungs: wheezes posterior - bilateral  Heart: regular rate and rhythm, S1, S2 normal, no murmur, click, rub or gallop  Skin: Skin color, texture, turgor normal. No rashes or lesions  Lymph nodes: Cervical, supraclavicular, and axillary nodes normal.  Neurologic: Grossly normal     This note has been dictated using voice recognition software. Any grammatical or context distortions are unintentional and inherent to the software

## 2021-06-16 NOTE — PROGRESS NOTES
Assessment/Plan:     Patient presents with symptoms consistent with an ongoing bronchitis.  Did not feel the need to swab for strep or influenza.  Encourage symptomatic treatment with fluids, rest, treatments for the sore throat, and cough suppressant during the day.  Prescribed Percocet Phenazine with codeine to help with cough suppressant at night so the patient may get adequate rest.  Encouraged follow-up if symptoms are worsening.    Problem List Items Addressed This Visit     None      Visit Diagnoses     Bronchitis    -  Primary    Relevant Medications    codeine-guaiFENesin (GUAIFENESIN AC)  mg/5 mL liquid          Return to clinic PRN.    Total time spent with patient was 15 minutes with greater than 50% spent in face-to-face counseling regarding the above plan.    Subjective:      Korin Sharma is a 62 y.o. female who presents to clinic for possible influenza.      Patient has been experiencing symptoms for 6 days. It is not improving or worsening.  Patient endorses:  congestion, post-nasal drip, sore throat, sinus pressure located diffusely, fatigue with increased sleep, muscle aches, and cough is both dry and productive of sputum, low grade headache which worsens with coughing  Patient denies: SOB, chest pain, fever, ear pain, ear discharge, rash, rhinorrhea,  Treatment thus far has consisted of dayquil-esque, ibuprofen, decongestant, Tessalon perles.  Sick contacts include daughter.  Recent travel denied.      Past Medical History, Family History, and Social History reviewed.     Current Outpatient Prescriptions on File Prior to Visit   Medication Sig Dispense Refill     albuterol (PROVENTIL HFA;VENTOLIN HFA) 90 mcg/actuation inhaler Inhale 2 puffs every 4 (four) hours as needed for wheezing. 1 Inhaler 0     aspirin 81 mg chewable tablet Chew 81 mg daily.       blood sugar diagnostic Strp Use 1 strip As Directed daily. 50 strip 1     levothyroxine (SYNTHROID, LEVOTHROID) 200 MCG tablet TAKE 1  TABLET(200 MCG) BY MOUTH DAILY 90 tablet 3     lisinopril-hydrochlorothiazide (PRINZIDE,ZESTORETIC) 10-12.5 mg per tablet TAKE 1 TABLET BY MOUTH DAILY 90 tablet 1     simvastatin (ZOCOR) 10 MG tablet TAKE 1 TABLET(10 MG) BY MOUTH BEDTIME 90 tablet 3     No current facility-administered medications on file prior to visit.        Review of systems is as stated in HPI.  The remainder of the 10 system review is otherwise negative.    Objective:     /70 (Patient Site: Right Arm, Patient Position: Sitting, Cuff Size: Adult Large)  Pulse 92  Temp 99.5  F (37.5  C) (Oral)   Resp 18  Wt (!) 234 lb (106.1 kg)  SpO2 92%  Breastfeeding? No  BMI 38.94 kg/m2 Body mass index is 38.94 kg/(m^2).    Gen: Alert, NAD, appears stated age, normal hygiene   Eyes: conjunctivae without injection, sclera clear, EOMI  ENT/mouth: nares clear, septum midline, absent rhinorrhea, mild pharyngeal injection, neck is supple, no thyroid enlargement  CV: RRR, no murmur appreciated, pedal edema absent bilaterally  Resp: CTAB, no wheezes, rales or ronchi, frequent harsh dry cough  ABD: non-tender to palpation, nondistended  MSK: grossly full range of motion in all joints, no obvious deformity  Neuro: CN II-XII grossly intact, no deficits in coordination  Psych: no apparent hallucinations or delusions, no pressured speech; alert, oriented x3  SKIN: dry and without lesions  Heme/lymph: no pallor, no active bleeding/bruising, no adenopathy appreciated    This note has been dictated using voice recognition software. Any grammatical or context distortions are unintentional and inherent to the the software.     Aurelia Alfaro MD

## 2021-06-16 NOTE — TELEPHONE ENCOUNTER
FYI- April 1, 2021 I spoke with Yady, she is in need of assistance for medications.    We reviewed the Prescription Assistance Program, income, insurance and Rx list.    The [facility] assistance application will be completed for Trulicity 0.75mg.    When approved, Yady will receive Trulicity at no cost for 1 year.    Graciela Oconnell  [facility]   Pharmacy Assistance  [phone]

## 2021-06-17 NOTE — TELEPHONE ENCOUNTER
I am in process of applying to the Trulicity assistance program.  [facility] requries a hand signed, hard copy, brand name script be submitted with their application.    Please hand sign a hard copy, brand name script for:    TRULICITY 0.75mg. inj 0.75mg q7d, #5 boxes/10mL, 2 refills    Please send the HARD COPY script to me:    Via Interoffice mail:  Graciela Cerrato    Via US mail:  [facility]  Graciela Oconnell  [address] [place]  [address]    Thanks so much for your help!    Graciela Oconnell  [facility]   Pharmacy Assistance  [phone]

## 2021-06-17 NOTE — PATIENT INSTRUCTIONS - HE
"Patient Instructions by Tani Bar RN at 1/2/2019 11:20 AM     Author: Tani Bar RN Service: -- Author Type: Registered Nurse    Filed: 1/2/2019 11:35 AM Encounter Date: 1/2/2019 Status: Signed    : Tani Bar RN (Registered Nurse)       We are prescribing some compression stockings for you. I have included different suppliers that should help you get measured and fitting to ensure proper fitting socks. You should wear this socks as much as you can. It is especially important to wear them with long periods of sitting/standing, long car rides or if you will be flying. Compression socks should get refilled every 4-6 months. They do not need to be worn at night while in bed.    If you do a lot of standing it is good to do calf raises to help keep the blood pumping. If you sit a lot at work it is good to get up periodically to walk around. Elevation of the foot of your bed 4-6\" helps the blood return back to where it is needed.    We would like you to follow up in 3 months after wearing socks to see how you are doing.    Varicose Veins      Varicose veins are swollen, enlarged veins most often found in the legs. They are usually blue or purple in color and may bulge, twist, and stand out under the skin.  Normally, veins return blood from the body to the heart. The leg veins have one-way valves that prevent blood from flowing backward in the vein. When the valves are weak or damaged, blood backs up in the veins. This may cause some of the veins to swell and bulge and become varicose veins.  Symptoms  Varicose veins may or may not cause symptoms. If symptoms do occur, they can include:    Legs that feel tired, achy, heavy, or itchy    Leg muscle cramps    Skin changes, such as discoloration, dryness, redness, or rash (in more severe cases, you may also have sores on the skin called venous leg ulcers)  Risk Factors  There are a number of factors that increase the risk for varicose veins. These can " include:    Being a woman    Being older    Sitting or standing for long periods    Being overweight    Being pregnant    Having a family history of varicose veins  Treatment begins with simple self-help measures (see below). If these dont help, there are many procedures that can be done to shrink or remove varicose veins. Your healthcare provider can tell you more about these options, if needed.  Home care    Support or compression stockings will likely be prescribed. If so, be sure to wear them as directed. They may help improve blood flow.    Exercising helps strengthen your leg muscles and improve blood flow. To get the most benefit, choose exercises such as walking, swimming, or cycling. Also try to exercise for at least 30 minutes on most days.    Raising (elevating) your legs lets gravity help blood flow back to the heart. Sit or lie with your feet above heart level a few times throughout the day, or as directed.    Avoid long periods of sitting or standing. Change positions often. Also, move your ankles, toes and knees often. This may also help improve blood flow.    If you are overweight, talk with your healthcare provider about setting up a weight-loss plan. Maintaining a healthy weight can help reduce the strain on your veins. It may also improve symptoms, such as swelling and aching.    If you have dryness and itching, ask your provider about special lotions that can be applied to the skin to help improve symptoms.  Follow-up care  Follow up with your healthcare provider, or as directed. If imaging tests were done, youll be told the results and if there are any new findings that affect your care.  When to seek medical advice  Call your healthcare provider right away if any of these occur:    Sudden, severe leg swelling, pain, or redness    Symptoms worsen, or they dont improve with self-care    Bleeding from any affected veins    Ulcers form on the legs, ankles, or feet    Fever of 100.4 F (38 C) or  higher, or as advised by your provider      Understanding Spider and Varicose Veins  Do you often hide your legs because of the way they look? You may have noticed tiny red or blue bursts (spider veins). Or maybe you have veins that bulge or look twisted (varicose veins). If so, there are treatments that can help  What are the symptoms?  Spider veins or varicose veins may never be a problem. But sometimes they can cause legs to ache or swell. Your legs may also feel heavy and tired, or like theyre burning. These symptoms may be more severe at the end of the day. Prolonged sitting or standing can also make your symptoms worse.  Who gets spider and varicose veins?  Anyone can get spider or varicose veins. But vein problems tend to be hereditary (run in families). Other factors that can affect veins include:    Pregnancy, hormones, and birth control pills    A job where you stand or sit a lot    Extra weight or lack of exercise    Age         Spider veins look like tiny webs on the ankles, legs, and upper thighs.       Ropy, dark blue, red, or flesh-colored varicose veins are most common on the thighs, calves, and feet.    What can be done?  Spider and varicose veins can affect the way you feel about yourself. Talk to your healthcare provider about your concerns. There are treatments that can ease symptoms and make your legs look better.  Your treatment choices  Treatment may include self-care, sclerotherapy (injecting veins with a chemical), surgery, or newer nonsurgical minimally invasive therapies. Spider veins and some varicose veins can be treated with sclerotherapy. Large varicose veins can often be treated with newer minimally invasive procedures and, in rare cases, surgery may be needed.     Please call [facility] to schedule an appointment. If you received a prescription please bring it with you to your appointment. You may call one of the locations below, although some locations are  limited to what they carry.    Office Locations    [place]  [address]/Orthotics and Prosthetics [address]  [place] [address]   Phone: [phone]  Fax [phone]    [facility]  [address]  [place] [address]  Phone: [phone]  Fax: [phone]    [facility]   [address]  [place] [address]  Phone: [phone]  Fax: [phone]    [facility]  [address]  [place] [address]  Phone: [phone]  Fax: [phone]    [facility]  [address]  [place] [address]  Phone: [phone]  Fax: [phone]    [facility]  [address]   [place] [address]   Phone: [phone]  Fax: [phone]    [place]   [address] [place] [address]   Phone: [phone]  Fax: [phone]    [facility]  [address]  [place] [address]    [place] [phone]  [phone]  [phone]  [place] [phone]  [email or web address]      [facility]   [address]             [address]                 [address]     [place] [address]      [place] [address]         [place] [address]  [phone]  [phone]     [phone]               Fax: [phone]  [email or web address]                                                     [facility]  [address] [place] [address]  [phone]  [phone]  [email or web address]    Daniel Welch  [phone]  [email or web address]    [facility] Medical supply   [phone]    [place] [address] [place] [address]    [phone]

## 2021-06-17 NOTE — TELEPHONE ENCOUNTER
Telephone Encounter by Marian Rowland at 11/19/2020  3:07 PM     Author: Marian Rowland Service: -- Author Type: --    Filed: 11/19/2020  3:08 PM Encounter Date: 11/18/2020 Status: Signed    : Marian Rowland       PRIOR AUTHORIZATION DENIED    Denial Rational: Must try/fail the following preferred medications in the last 12 months:  Trulicity   Victoza        Appeal Information: This medication was denied. If physician would like to appeal because patient has contraindication or allergy to covered medication please write letter of medical necessity and route back to PA team to initiate.  If no further action is needed please close encounter thank you.

## 2021-06-17 NOTE — TELEPHONE ENCOUNTER
I signed this recently.  Otherwise, can you please enter and I can sign a printed script?  Thanks.

## 2021-06-17 NOTE — PROGRESS NOTES
Assessment and Plan:     1. Benign essential hypertension  Patient's blood pressure is low.  Will discontinue lisinopril/hydrochlorothiazide for 1 week to see if leg cramps improve.  She will monitor her blood pressure in the meantime.  We did discuss initiating lisinopril 10 mg on its own, but she would like to avoid antihypertensives at this time.  She will message me via [employer].  If leg cramps do improve, will initiate lisinopril 10 mg daily for renal protection.    2. Type 2 diabetes mellitus without complication, without long-term current use of insulin (H)  We will check A1c today.  She continues Metformin and Trulicity as prescribed.  - Glycosylated Hemoglobin A1c  - Ferritin    3. Leg cramps  Will rule out hypomagnesemia and iron deficiency anemia.  Discussed the importance of good hydration.  I will discontinue diuretic to see if this improves the leg cramps.  - Magnesium  - HM2(CBC w/o Differential)  - Ferritin    4. Class 2 severe obesity due to excess calories with serious comorbidity and body mass index (BMI) of 37.0 to 37.9 in adult (H)  Discussed weight loss goals.    5. Visit for screening mammogram  - Mammo Screening Bilateral; Future    The patient is content with the plan and will follow-up in 6 months for medication management or sooner with any further concerns.       Subjective:     Korin is a 66 y.o. female presenting to the clinic for her diabetes management.  Patient is currently taking Metformin 1000 mg twice daily and injecting Trulicity 0.75 mg weekly.  She is tolerating the medication well without any side effects.  Last A1c was 7.6% on 11/3/2020.  She is consuming a healthy diet and has been walking for exercise.  Her significant other recently had gastric bypass surgery, so this has changed her diet as well.  Patient randomly checks her blood sugars and they have been ranging 110-1 30.  She is due for an eye exam.  She denies any sores on her feet.  She has been experiencing lower  "extremity cramping for multiple years.  Patient states she will wake up with a \"charley horse\" and needs to walk around the house to improve her symptoms.  She denies recent travel. She has seen the vascular specialist in the past.  She has a history of hypertension which is controlled with lisinopril-hydrochlorothiazide.  She continues simvastatin for hyperlipidemia.    Review of Systems: A complete 14 point review of systems was obtained and is negative or as stated in the history of present illness.    Social History     Socioeconomic History     Marital status: Single     Spouse name: Not on file     Number of children: Not on file     Years of education: Not on file     Highest education level: Not on file   Occupational History     Not on file   Social Needs     Financial resource strain: Not on file     Food insecurity     Worry: Not on file     Inability: Not on file     Transportation needs     Medical: Not on file     Non-medical: Not on file   Tobacco Use     Smoking status: Former Smoker     Smokeless tobacco: Never Used     Tobacco comment: quit in 1983   Substance and Sexual Activity     Alcohol use: Yes     Comment: couple per week     Drug use: No     Sexual activity: Not on file   Lifestyle     Physical activity     Days per week: Not on file     Minutes per session: Not on file     Stress: Not on file   Relationships     Social connections     Talks on phone: Not on file     Gets together: Not on file     Attends Restoration service: Not on file     Active member of club or organization: Not on file     Attends meetings of clubs or organizations: Not on file     Relationship status: Not on file     Intimate partner violence     Fear of current or ex partner: Not on file     Emotionally abused: Not on file     Physically abused: Not on file     Forced sexual activity: Not on file   Other Topics Concern     Not on file   Social History Narrative     Not on file       Active Ambulatory Problems     " Diagnosis Date Noted     Obesity      Hypothyroidism      Cholelithiasis      Acute Pancreatitis      Menopause Has Occurred      Vitamin D Deficiency      Diabetes mellitus (H) 06/29/2015     Obesity (BMI 35.0-39.9) with comorbidity (H) 09/17/2018     Benign essential hypertension 11/15/2018     Hypertriglyceridemia 04/11/2019     Resolved Ambulatory Problems     Diagnosis Date Noted     Atypical Chest Pain      Prediabetes      Joint Pain Fingers      Arthropathy Of The Ankle / Foot      Midback Pain      No Additional Past Medical History       Family History   Problem Relation Age of Onset     Heart disease Mother      Diabetes Mother      Hypertension Father      Atrial fibrillation Father        Objective:     /60 (Patient Site: Right Arm, Patient Position: Sitting, Cuff Size: Adult Large)   Pulse 80   Wt 220 lb (99.8 kg)   BMI 35.78 kg/m      Patient is alert, in no obvious distress.   Skin: Warm, dry.   Lungs:  Clear to auscultation. Respirations even and unlabored.  No wheezing or rales noted.   Heart:  Regular rate and rhythm.  No murmurs, S3, S4, gallops, or rubs.    Abdomen: Soft, nontender.  No organomegaly. Bowel sounds normoactive. No guarding or masses noted.   Musculoskeletal:  Full ROM of extremities. Varicose veins present within her bilateral lower extremities.

## 2021-06-17 NOTE — PATIENT INSTRUCTIONS - HE
"Patient Instructions by Mayo Huang MD at 4/17/2019 11:20 AM     Author: Mayo Huang MD Service: -- Author Type: Physician    Filed: 4/17/2019 11:41 AM Encounter Date: 4/17/2019 Status: Signed    : Mayo Huang MD (Physician)       We are prescribing some compression stockings for you. I have included different suppliers that should help you get measured and fitting to ensure proper fitting socks. You should wear this socks as much as you can. It is especially important to wear them with long periods of sitting/standing, long car rides or if you will be flying. Compression socks should get refilled every 4-6 months. They do not need to be worn at night while in bed.    If you do a lot of standing it is good to do calf raises to help keep the blood pumping. If you sit a lot at work it is good to get up periodically to walk around. Elevation of the foot of your bed 4-6\" helps the blood return back to where it is needed.    We would like you to follow up in 3 months after wearing socks to see how you are doing.  [facility].      [facility]  [place] [phone]   [address]  [place]  [address]    [place] [phone]    [facility] [phone]    [place] [phone]  [address] [place] [address] Bldg.   [address]  [place] [address]  Phone: [phone]  Fax: [phone]    [facility] Bldg.  [address]  [place] [address]  Phone: [phone]  Fax: [phone]    [facility]  [address]  [address]  [place] [address]  Phone: [phone]  Fax: [phone]    [facility]  [address] [place] [address] [address]   [place] [address]   Phone: [phone]  Fax: [phone]    [address]  [place], [place] " [address]   Phone: [phone]  Fax: [phone]        [facility]    [address]  [place] [address]    [place] [phone]  [phone]  [place] [phone]  [email or web address]      [facility]   [address]             [address]                 [address]     [place] [address]      [place] [address]         [place] [address]  [phone]  [phone]     [phone]               Fax: [phone]  [email or web address]  [address] [place], [place] [address] [phone]  [phone]  [email or web address]    [place]  [phone]  [employer]    [facility]   [phone]    [facility]  [address] [place] [address]  [phone]        Understanding Spider and Varicose Veins      Do you often hide your legs because of the way they look? You may have noticed tiny red or blue bursts (spider veins). Or maybe you have veins that bulge or look twisted (varicose veins). If so, there are treatments that can help.    What Are the Symptoms?  Spider veins or varicose veins may never be a problem. But sometimes they can cause legs to ache or swell. Your legs may also feel heavy and tired, or like theyre burning. These symptoms may be more severe at the end of the day. Prolonged sitting or standing can also make your symptoms worse.        Who Gets Spider and Varicose Veins?  Anyone can get spider or varicose veins. But vein problems tend to be hereditary (run in families). Other factors that can affect veins include:    Pregnancy, hormones, and birth control pills    A job where you stand or sit a lot    Extra weight or lack of exercise    Age    What Can Be Done?  Spider and varicose veins  can affect the way you feel about yourself. Talk to your doctor about your concerns. There are treatments that can ease symptoms and make your legs look better.  Your Treatment Options  Treatment may include self-care, sclerotherapy (injecting veins with a chemical), or surgery. Spider veins and some varicose veins can be treated with sclerotherapy. Large varicose veins may need surgery.    7725-6021 [employer]. [address] [place] [address], [place] [address]. All rights reserved. This information is not intended as a substitute for professional medical care. Always follow your healthcare professional's instructions.      Ultrasound    Thank you for choosing [facility] as partners in your care.  Please read the following information before your appointment.  Feel free to ask questions.    An ultrasound is an exam that uses sound waves to create pictures.  The special camera that takes the pictures is called a transducer.  An ultrasound technologist will perform the exam under the direction of a physician.    Preparation  Ultrasound preps vary.  If you are scheduled for an Aorta Ultrasound, please do not eat or drink anything 8 hours before your exam.  You may take daily medications with a small sip of water.  There is no preparation required for any of the other ultrasound exams performed at [facility].    The Examination  You may or may not need to change clothes for your exam.  The technologist will explain the exam to you.  He or she will ask you a few questions and answer any questions you may have.    You will lie on a table and a gel will be applied to your skin.  A small handheld instrument called a transducer will be moved across the area we are looking at while pictures are taken.  Also, your exam may include measuring your blood pressure on your arms, legs and/or toes.    When the Exam Is Completed  Your physician will receive the results of the exam and explain  them to you.  You may return to your normal diet and activities unless otherwise directed by your doctor.  Feel free to ask questions if something is not clear to you.  We welcome comments.    At Health system, we are dedicated to providing the best possible care.  Thank you for taking time to read these instructions.  We hope your experience is as pleasant as possible.      You are scheduled for the following exam(s):    [x]Venous Duplex:  Evaluates the veins that carry blood to the heart from the arms and/or legs.  Gel is applied to the skin of the arms and/or legs.  A transducer will be placed on the gel covered areas to obtain images and evaluate flow in the veins.  This exam takes approximately 30 minutes per arm/leg.

## 2021-06-18 NOTE — LETTER
Letter by Racquel Dunn CNP at      Author: Racquel Dunn CNP Service: -- Author Type: --    Filed:  Encounter Date: 1/18/2019 Status: (Other)       Korin Sharma  [address]  [facility] [address]             January 18, 2019         Dear Ms. Sharma,    Below are the results from your recent visit:    Resulted Orders   Glycosylated Hemoglobin A1c   Result Value Ref Range    Hemoglobin A1c 7.7 (H) 3.5 - 6.0 %       Your A1C remains elevated. I would like to add on glipizide 5 mg daily. You should take this 30 minutes before a meal. This medication can cause a drop in blood sugar, so I recommend you monitor your blood sugars closely. I recommend a lab recheck in 3 months.       Please call with questions or contact us using [employer].    Sincerely,        Electronically signed by Racquel Dunn CNP

## 2021-06-18 NOTE — PATIENT INSTRUCTIONS - HE
Patient Instructions by Luis Siddiqui MD at 12/8/2020 10:00 AM     Author: Luis Siddiqui MD Service: -- Author Type: Physician    Filed: 12/8/2020 10:24 AM Encounter Date: 12/8/2020 Status: Addendum    : Luis Siddiqui MD (Physician)    Related Notes: Original Note by Luis Siddiqui MD (Physician) filed at 12/8/2020 10:04 AM       Patient Education     Tinnitus (Ringing in the Ears)     Treatment may include maskers and hearing aids.     Tinnitus is the term for a noise in your ear not caused by an outside sound. The noise might be a ringing, clicking, hiss, or roar. It can vary in pitch and may be soft or quite loud. For some people, tinnitus is a minor nuisance. But for others, the noise can make it hard to hear, work, and even sleep. When tinnitus can't be cured, a number of treatments may offer relief.  What causes tinnitus?  Loud noises, hearing loss, and ear wax can cause tinnitus. So can certain medicines. Large amounts of aspirin or caffeine are sometimes to blame. In many cases, the exact cause of tinnitus is unknown.  How is tinnitus treated?  Identifying and removing the cause is the best way to treat tinnitus. For that reason, your healthcare provider may refer you to an otolaryngologist (ear, nose, and throat doctor). Your hearing may also be checked by an audiologist (hearing specialist). If you have hearing loss, wearing a hearing aid may help your tinnitus. When the cause can't be found, the tinnitus itself may be treated. Some of the treatments are listed below, and your healthcare provider can tell you more about them:    Maskers are small devices that look like hearing aids. They emit a pleasant sound that helps cover up the ringing in your ears. Hearing aids and maskers are sometimes used together.    Medicines that treat anxiety and depression may ease tinnitus in some people.    Hypnosis or relaxation therapy may help head noise seem less severe.    Tinnitus retraining therapy combines  counseling and maskers. Both can help take your mind off the tinnitus.  For more information    American Speech-Hearing-Language Association 342-112-5289 www.mika.org    American Tinnitus Association 286-387-7650 www.servando.org    National [place] on Deafness and other Communication Disorders 423-725-1780 www.nidcd.nih.gov   Date Last Reviewed: 7/1/2016 2000-2019 [employer]. [address] [place] [address], [place] [address]. All rights reserved. This information is not intended as a substitute for professional medical care. Always follow your healthcare professional's instructions.         Sinus CT ordered today  (you will be called for appointment)  Astelin nasal spray as directed for PND  Return visit 1 month to review CT images  Referral for tinnitus therapy/hearing aid evaluation placed

## 2021-06-19 NOTE — PROGRESS NOTES
Admission History & Physical  Korin Sharma, 1955, [id number]    [facility]  Racquel Dunn, CNP, [phone]    Extended Emergency Contact Information  Primary Emergency Contact: Swati Sharma   [facility]  Home Phone: [phone]  Relation: Life Partner     Assessment and Plan:   Assessment: Onychomycosis, onychauxis, diabetes mellitus  Plan: Debrided nails 1 through 5 both feet  Active Problems:    * No active hospital problems. *      Chief Complaint:  Thick discolored nails both feet     HPI:    Korin Sharma is a 63 y.o. old female who presented to the clinic today complaining of thick discolored toenails both feet.  The patient stated she is unable to trim her nails.  She stated that particularly the second toe right foot is extremely dark and discolored.  She has some pain with normal shoe wear.  She has not had any redness, swelling, drainage of bleeding.  She denies any other previous treatment.   History is provided by patient    Medical History  Active Ambulatory (Non-Hospital) Problems    Diagnosis     Diabetes mellitus (H)     Vitamin D Deficiency     Obesity     Hypothyroidism     Cholelithiasis     Acute Pancreatitis     Menopause Has Occurred     No past medical history on file.  Patient Active Problem List    Diagnosis Date Noted     Diabetes mellitus (H) 06/29/2015     Vitamin D Deficiency      Obesity      Hypothyroidism      Cholelithiasis      Acute Pancreatitis      Menopause Has Occurred      Surgical History  She  has a past surgical history that includes pr knee scope,diagnostic; pr knee scope,diagnostic; pr stapedectomy; pr shldr arthroscop,diagnostic; pr reduction of large breast; pr appendectomy,rupt appendx+abscess; and Cholecystectomy.   Past Surgical History:   Procedure Laterality Date     CHOLECYSTECTOMY       [place] APPENDECTOMY,RUPT APPENDX+ABSCESS      Description: Appendectomy For Ruptured Appendix;  Proc Date: 10/01/2005;     [place] KNEE  SCOPE,DIAGNOSTIC      Description: Arthroscopy Knee Left;  Recorded: 08/15/2007;     [place] KNEE SCOPE,DIAGNOSTIC      Description: Arthroscopy Knee Left;  Proc Date: 08/01/2007;     VA REDUCTION OF LARGE BREAST      Description: Breast Surgery Reduction Procedure Bilateral;  Recorded: 08/15/2007;     VA SHLDR ARTHROSCOP,DIAGNOSTIC      Description: Arthroscopy Shoulder Right;  Recorded: 08/15/2007;  Comments: w/decompression     VA STAPEDECTOMY      Description: Stapedectomy;  Recorded: 08/15/2007;  Comments: assoc hearing loss---uses aid    Social History  Reviewed, and she  reports that she has quit smoking. She has never used smokeless tobacco. She reports that she drinks alcohol. She reports that she does not use illicit drugs.  Social History   Substance Use Topics     Smoking status: Former Smoker     Smokeless tobacco: Never Used      Comment: quit in 1983     Alcohol use Yes      Comment: couple per week      Allergies  No Known Allergies Family History  Reviewed, and family history includes Atrial fibrillation in her father; Diabetes in her mother; Heart disease in her mother; Hypertension in her father.   Psychosocial Needs  Social History     Social History Narrative     Additional psychosocial needs reviewed per nursing assessment.       Prior to Admission Medications     (Not in a hospital admission)        Review of Systems - Negative     BP 92/60  Pulse 71  Temp 97.3  F (36.3  C) (Temporal)   SpO2 95%    Objective findings: Gen.: The patient is alert and in no acute distress      Integument: Nails bilateral feet are elongated and 2 times normal thickness. Skin bilateral feet warm supple and intact.      Vascular: DP and PT pulses +2 over 4 bilaterally. Capillary refill 3 seconds bilateral feet.      Neurologic: Negative clonus, negative Babinski bilateral.        Musculoskeletal: Range of motion within normal limits bilateral feet. Muscle power +5 over 5 bilaterally in all compartments.  There is  severe flattening of the medial longitudinal arch noted bilaterally.             Assessment: Onychomycosis, onychauxis, diabetes mellitus        Plan:   I debrided nails 1 through 5 both feet today.   The patient is to return to the clinic as needed.

## 2021-06-19 NOTE — LETTER
Letter by Racquel Dunn CNP at      Author: Racquel Dunn CNP Service: -- Author Type: --    Filed:  Encounter Date: 4/17/2019 Status: (Other)         Korin Sharma  [address]  [facility] [address]             April 17, 2019         Dear Ms. Sharma,    Below are the results from your recent visit:    Resulted Orders   Gynecologic Cytology (PAP Smear)   Result Value Ref Range    Case Report       Gynecologic Cytology Report                       Case: [id number]                                   Authorizing Provider:  Racquel Dunn CNP        Collected:           04/11/2019 1209              Ordering Location:     [facility] Received:            04/11/2019 1210              First Screen:          Jacquelin Currie CT                                                                               (ASCP)                                                                       Specimen:    SUREPATH PAP, SCREENING, Endocervical/cervical                                             Interpretation  Negative for squamous intraepithelial lesion or malignancy.      Negative for squamous intraepithelial lesion or malignancy    Result Flag Normal Normal    Other Findings       Fungal organisms morphologically consistent with Candida spp    Specimen Adequacy       Satisfactory for evaluation, endocervical/transformation zone component present    HPV Reflex? Yes regardless of result     HIGH RISK No     LMP/Menopause Date in her 50's     Abnormal Bleeding No     Pt Status na     Birth Control/Hormones None     Previous Normal/Date 4/23/13     Prev Abn Date/Dx none     Cervical Appearance normal    Glycosylated Hemoglobin A1c   Result Value Ref Range    Hemoglobin A1c 7.3 (H) 3.5 - 6.0 %   HPV High Risk DNA Cervical   Result Value Ref Range    HPV Source SurePath     HPV16 DNA Negative NEG    HPV18 DNA Negative NEG    Other HR HPV Negative NEG    Final Diagnosis SEE NOTES       Comment:      This  patient's sample is negative for HPV DNA.  This test was developed and its performance characteristics determined by the  [facility]. It  has not been cleared or approved by the FDA. The laboratory is regulated under  CLIA as qualified to perform high-complexity testing. This test is used for  clinical purposes. It should not be regarded as investigational or for  research.  (Note)  METHODOLOGY:  The Roche ramseh 4800 system uses automated extraction,  simultaneous amplification of HPV (L1 region) and beta-globin,  followed by  real time detection of fluorescent labeled HPV and beta  globin using specific oligonucleotide probes . The test specifically  identifies types HPV 16 DNA and HPV 18 DNA while concurrently  detecting the rest of the high risk types (31, 33, 35, 39, 45, 51,  52, 56, 58, 59, 66 or 68).    COMMENTS:  This test is not intended for use as a screening device  for women under age 30 with normal cervical   cytology.  Results should  be correlated with cytologic and histologic findings. Close clinical  followup is recommended.        Specimen Description Cervical Cells       Comment:      Performed and/or entered by:  [address] [address]        Your pap smear is normal.  Your A1C has improved.  I recommend continuing with your medications as prescribed.  Let's recheck your labs in six months.     Please call with questions or contact us using [employer].    Sincerely,        Electronically signed by Racquel Dunn CNP

## 2021-06-19 NOTE — LETTER
Letter by Racquel Dunn CNP at      Author: Racquel Dunn CNP Service: -- Author Type: --    Filed:  Encounter Date: 10/7/2019 Status: Signed         Korin Sharma  [address] [facility] [address]  [facility] [address]      October 7, 2019      Dear Korin Sharma,    Per our refill protocol, you are due for a diabetic check office visit. Your prescriptions for Metformin, Levothyroxine, and Lisinopril-Hydrochlorothiazide were sent to your pharmacy 10/07/2019. Please call [phone] to schedule an office visit with Racquel Dunn CNP before your next refill is needed to avoid delays.    Thank you,  [facility]

## 2021-06-20 NOTE — PROGRESS NOTES
Assessment and Plan:     1. Diabetes mellitus, type 2 (H)  Patient's A1c is 8.4%.  Will start metformin 1000 mg twice daily.  We will have her start with 1 tablet daily and gradually increase by 1 tablet every week until she is taking 2 tablets twice daily.  Hopefully, this will improve tolerance.  She will follow-up in 3 months for a diabetes recheck.  - Glycosylated Hemoglobin A1c  - Microalbumin, Random Urine  - metFORMIN (GLUCOPHAGE) 500 MG tablet; Take 2 tablets (1,000 mg total) by mouth 2 (two) times a day with meals.  Dispense: 360 tablet; Refill: 0    2. Hypothyroidism, unspecified type  We will check thyroid cascade and adjust levothyroxine accordingly  - Thyroid Cascade  - levothyroxine (SYNTHROID, LEVOTHROID) 200 MCG tablet; TAKE 1 TABLET(200 MCG) BY MOUTH DAILY  Dispense: 90 tablet; Refill: 3    3. Mixed hyperlipidemia  She continues simvastatin as prescribed.  Will adjust according to lipid cascade results peer  - Lipid Cascade; Future  - simvastatin (ZOCOR) 10 MG tablet; TAKE 1 TABLET(10 MG) BY MOUTH BEDTIME  Dispense: 90 tablet; Refill: 2  - Lipid Cascade    4. Bilateral leg pain  We will check magnesium and electrolyte levels.  Suspect leg cramps is related to venous stasis and uncontrolled diabetes.  Will refer to vascular Center for further evaluation.  Discussed using compression socks.  - Ambulatory referral to Vascular Center  - Magnesium    5. Varicose veins of both lower extremities  Will refer to vascular center.  - Ambulatory referral to Vascular Center    6. Edema, unspecified type  She continues lisinopril-hydrochlorothiazide as prescribed.  - lisinopril-hydrochlorothiazide (PRINZIDE,ZESTORETIC) 10-12.5 mg per tablet; Take 1 tablet by mouth daily.  Dispense: 90 tablet; Refill: 2    7. Visit for screening mammogram  - Mammo Screening Bilateral; Future    8. Screen for colon cancer  - Ambulatory referral for Colonoscopy    9. Medication management  - Comprehensive Metabolic Panel    10.  Health care maintenance  - Td, Preservative Free (green label)    11. Morbid obesity (H)  The following high BMI interventions were performed this visit: dietary needs education, exercise promotion: strength training and exercise promotion: stretching    The patient is content with the plan and will follow-up in 3 months for medication management or sooner with any further concerns.     Subjective:     Korin is a 63 y.o. female presenting to the clinic for medication management.  Patient has multiple comorbidities including type 2 diabetes, hypothyroidism, hypertension, hyperlipidemia.  She is not currently taking medication for diabetes.  She does not believe she received the message to start metformin.  Patient's last hemoglobin A1c was 8% on 7/12/17.  She has not been checking her blood sugars.  Her last eye exam was last year.  She denies any sores on her feet.  Patient states her feet have started tingling, she has developed leg cramps at night, and has noticed some slight edema.  She denies chest pain, shortness of breath with exertion, orthopnea, syncope.  Patient has hypothyroidism and is taking levothyroxine 200 mcg daily.  Last TSH was 7.43 on 7/12/17.  She is taking simvastatin 10 mg daily for hyperlipidemia.  Last cholesterol check was on 7/12/17 with a total cholesterol 149, triglycerides 156, LDL 79, HDL 39.    Review of Systems: A complete 14 point review of systems was obtained and is negative or as stated in the history of present illness.    Social History     Social History     Marital status: Single     Spouse name: N/A     Number of children: N/A     Years of education: N/A     Occupational History     Not on file.     Social History Main Topics     Smoking status: Former Smoker     Smokeless tobacco: Never Used      Comment: quit in 1983     Alcohol use Yes      Comment: couple per week     Drug use: No     Sexual activity: Not on file     Other Topics Concern     Not on file     Social  "History Narrative       Active Ambulatory Problems     Diagnosis Date Noted     Obesity      Hypothyroidism      Cholelithiasis      Acute Pancreatitis      Menopause Has Occurred      Vitamin D Deficiency      Diabetes mellitus (H) 06/29/2015     Resolved Ambulatory Problems     Diagnosis Date Noted     Atypical Chest Pain      Prediabetes      Joint Pain Fingers      Arthropathy Of The Ankle / Foot      Midback Pain      No Additional Past Medical History       Family History   Problem Relation Age of Onset     Heart disease Mother      Diabetes Mother      Hypertension Father      Atrial fibrillation Father        Objective:     /62  Pulse 72  Ht 5' 5\" (1.651 m)  Wt (!) 232 lb 9.6 oz (105.5 kg)  BMI 38.71 kg/m2    Patient is alert, in no obvious distress.   Skin: Warm, dry.    Neck: Supple, no lymphadenopathy. No thyromegaly.  Lungs:  Clear to auscultation. Respirations even and unlabored.  No wheezing or rales noted.   Heart:  Regular rate and rhythm.  No murmurs, S3, S4, gallops, or rubs.    Abdomen: Soft, nontender.  No organomegaly. Bowel sounds normoactive. No guarding or masses noted.   Musculoskeletal: Patient has varicose veins present behind both knees.  She has venous stasis.  She is slight nonpitting edema present in bilateral lower extremities.               "

## 2021-06-21 NOTE — PROGRESS NOTES
Assessment and Plan:     1. Type 2 diabetes mellitus without complication, without long-term current use of insulin (H)  Discussed blood sugar goals.  She will continue to check them twice daily.  She will continue metformin 1000 mg twice daily.  She is due for an A1c recheck at the end of December.    2. Benign essential hypertension  This is well controlled with lisinopril-hydrochlorothiazide.    3. Obesity (BMI 35.0-39.9) with comorbidity (H)  The following high BMI interventions were performed this visit: encouragement to exercise and lifestyle education regarding diet    4. Hypothyroidism, unspecified type  This is controlled with levothyroxine.    I spent 25 minutes with the patient with greater than 50% spent discussing symptoms, treatment options, and coordination of care.     Subjective:     Korin is a 63 y.o. female presenting to the clinic for questions regarding her diabetes. Patient has multiple comorbidities including type 2 diabetes, hypothyroidism, hypertension, hyperlipidemia.  Her last hemoglobin A1c was 8.4% on 9/17/18.  She is taking metformin 1000 mg twice daily.  She is consuming a healthy diet.  She admits to less exercise during the winter.  She is checking her blood sugars twice daily and they have been ranging 120-180.  Blood sugars are usually around 130 fasting.  Her last eye exam was 1-1/2 years ago.  She denies any sores on her feet.  She is taking simvastatin 10 mg daily for hyperlipidemia.  Last cholesterol check was on 9/17/18 with a total cholesterol 129, triglycerides 181, LDL 55, HDL 38.  She is taking lisinopril-hydrochlorothiazide 10-12.5 mg once daily for hypertension which is well controlled.    Review of Systems: A complete 14 point review of systems was obtained and is negative or as stated in the history of present illness.    Social History     Socioeconomic History     Marital status: Single     Spouse name: Not on file     Number of children: Not on file     Years of  "education: Not on file     Highest education level: Not on file   Social Needs     Financial resource strain: Not on file     Food insecurity - worry: Not on file     Food insecurity - inability: Not on file     Transportation needs - medical: Not on file     Transportation needs - non-medical: Not on file   Occupational History     Not on file   Tobacco Use     Smoking status: Former Smoker     Smokeless tobacco: Never Used     Tobacco comment: quit in 1983   Substance and Sexual Activity     Alcohol use: Yes     Comment: couple per week     Drug use: No     Sexual activity: Not on file   Other Topics Concern     Not on file   Social History Narrative     Not on file       Active Ambulatory Problems     Diagnosis Date Noted     Obesity      Hypothyroidism      Cholelithiasis      Acute Pancreatitis      Menopause Has Occurred      Vitamin D Deficiency      Diabetes mellitus (H) 06/29/2015     Obesity (BMI 35.0-39.9) with comorbidity (H) 09/17/2018     Resolved Ambulatory Problems     Diagnosis Date Noted     Atypical Chest Pain      Prediabetes      Joint Pain Fingers      Arthropathy Of The Ankle / Foot      Midback Pain      No Additional Past Medical History       Family History   Problem Relation Age of Onset     Heart disease Mother      Diabetes Mother      Hypertension Father      Atrial fibrillation Father        Objective:     /64   Pulse 78   Ht 5' 5\" (1.651 m)   Wt (!) 236 lb 1.6 oz (107.1 kg)   BMI 39.29 kg/m      Patient is alert, in no obvious distress.   Skin: Warm, dry.   Neck: Supple, no lymphadenopathy.  No thyromegaly.  Lungs:  Clear to auscultation. Respirations even and unlabored.  No wheezing or rales noted.   Heart:  Regular rate and rhythm.  No murmurs.    Musculoskeletal: No edema present in bilateral lower extremities.               "

## 2021-06-22 NOTE — PROGRESS NOTES
[facility] Vein Consult      Assessment:     1. varicose veins, bilateral   2. spider veins, bilateral     Plan:     1. Treatment options of conservative therapy of stockings use, exercise, weight loss,  elevating legs when possible.    2. Script for compression stockings 20-30 mm hg  3. Ultrasound to evaluate legs for incompetency of both deep and superficial system .   4. Surgical treatment, discussed briefly today.  5. Follow up: 3 months.   6. Call for any questions concerns or issues    Subjective:      Korin Sharma is a 63 y.o. female  who was referred by Racquel Dunn CNP  for evaluation of varicose veins. Symptoms include pain, aching, fatigue, burning, edema and dermatitis. Patient has history of leg selling, pain and vein issues that have progressed. Pain and symptoms have affected daily living and work activities needing medications. Here for evaluation today. no stocking or compression devic use    Allergies:Patient has no known allergies.    History reviewed. No pertinent past medical history.    Past Surgical History:   Procedure Laterality Date     CHOLECYSTECTOMY       NM APPENDECTOMY,RUPT APPENDX+ABSCESS      Description: Appendectomy For Ruptured Appendix;  Proc Date: 10/01/2005;     NM KNEE SCOPE,DIAGNOSTIC      Description: Arthroscopy Knee Left;  Recorded: 08/15/2007;     NM KNEE SCOPE,DIAGNOSTIC      Description: Arthroscopy Knee Left;  Proc Date: 08/01/2007;     NM REDUCTION OF LARGE BREAST      Description: Breast Surgery Reduction Procedure Bilateral;  Recorded: 08/15/2007;     NM SHLDR ARTHROSCOP,DIAGNOSTIC      Description: Arthroscopy Shoulder Right;  Recorded: 08/15/2007;  Comments: w/decompression     NM STAPEDECTOMY      Description: Stapedectomy;  Recorded: 08/15/2007;  Comments: assoc hearing loss---uses aid     REDUCTION MAMMAPLASTY Bilateral Around 2008       Current Outpatient Medications   Medication Sig     aspirin 81 MG EC tablet Take 81 mg by mouth daily.     blood  "glucose test strips Use 1 each As Directed daily. Dispense brand per patient's insurance at pharmacy discretion. (E11.9)     blood-glucose meter Misc Use 1 Device As Directed daily.     levothyroxine (SYNTHROID, LEVOTHROID) 200 MCG tablet TAKE 1 TABLET(200 MCG) BY MOUTH DAILY     lisinopril-hydrochlorothiazide (PRINZIDE,ZESTORETIC) 10-12.5 mg per tablet Take 1 tablet by mouth daily.     metFORMIN (GLUCOPHAGE) 500 MG tablet TAKE 2 TABLETS(1000 MG) BY MOUTH TWICE DAILY WITH MEALS     simvastatin (ZOCOR) 10 MG tablet TAKE 1 TABLET(10 MG) BY MOUTH BEDTIME       Family History   Problem Relation Age of Onset     Heart disease Mother      Diabetes Mother      Hypertension Father      Atrial fibrillation Father         reports that she has quit smoking. she has never used smokeless tobacco. She reports that she drinks alcohol. She reports that she does not use drugs.      Review of Systems  Pertinent items are noted in HPI.  A 12 point comprehensive review of systems was negative except as noted.      Objective:     Vitals:    01/02/19 1131   BP: 112/66   Pulse: 72   Resp: 16   Temp: 98.9  F (37.2  C)   Weight: (!) 239 lb (108.4 kg)   Height: 5' 5\" (1.651 m)     Body mass index is 39.77 kg/m .    EXAM:  GENERAL: This is a well-developed 63 y.o. female who appears her stated age  HEAD: normocephalic  HEENT: Pupils equal and reactive bilaterally  MOUTH: mucus membranes intact. Normal dentation  CARDIAC: RRR without murmur  CHEST/LUNG:  Clear to auscultation bilaterally  ABDOMEN: Soft, nontender, nondistended, no masses noted   NEUROLOGIC: Focally intact, nonfocal, alert and oriented x 3  INTEGUMENT: No open lesions or ulcers  VASCULAR: Pulses intact, symmetrical upper and lower extremities. There areskin changes consistent with chronic venous insufficiency. Varicose veins present in bilateral greater saphenous distribution. Spider veins present bilateral.                Imaging:    US Venous Insufficiency Legs Bilateral " (Order [id number])   Imaging   Date: 1/2/2019 Department: [facility] Ultrasound [place] Released By: Alisha Hyman RDMS Authorizing: Mayo Huang MD   Study Result     [facility] RADIOLOGY  LOCATION: [facility]  DATE: 1/2/2019     EXAM: BILATERAL LOWER EXTREMITY DEEP AND SUPERFICIAL VENOUS DUPLEX ULTRASOUND WITH PHYSIOLOGIC TESTING     INDICATION: Symptomatic varicose veins. Assess for incompetent veins.     TECHNIQUE: Supine and upright ultrasound of the deep and superficial veins with Valsalva and compression augmentation maneuvers. Duplex imaging is performed utilizing gray-scale, two-dimensional images, color-flow imaging, Doppler waveform analysis, and   spectral Doppler imaging.      INCOMPETENCY CRITERIA: Deep vein reflux reported when greater than 1,000 ms flow reversal.  Superficial vein reflux reported when greater than 500 ms flow reversal.  vein reflux reported as greater than 350 ms flow reversal.     DEEP VEIN FINDINGS:     RIGHT LEG: The common femoral, profunda femoral, femoral, popliteal, and visualized calf veins are patent and compressible.     LEFT LEG:  The common femoral, profunda femoral, femoral, popliteal, and visualized calf veins are patent and compressible.      RIGHT SUPERFICIAL VEIN FINDINGS:  GREAT SAPHENOUS VEIN: Competent from the saphenofemoral junction to the proximal thigh.     Focal incompetence at the knee where the vein measures 2 mm in diameter. The vein is competent at the mid calf.     SMALL SAPHENOUS VEIN: Competent at the saphenopopliteal junction. The remaining vein is not visualized.     No incompetent perforating veins or abnormal accessory veins identified.     LEFT SUPERFICIAL VEIN FINDINGS:  GREAT SAPHENOUS VEIN: Competent from the saphenopopliteal junction to the knee. The vein is incompetent at the mid calf where it measures 2 mm in diameter.     SMALL SAPHENOUS VEIN: Too small to evaluate.     No  incompetent perforating veins or abnormal accessory veins identified.     IMPRESSION:   CONCLUSION:   1.  No deep venous thrombosis of either lower extremity.  2.  RIGHT LEG: The right superficial venous system is patent, with focal incompetence of the greater saphenous vein at the knee.  3.  LEFT LEG: The left superficial venous system is patent, with focal incompetence of the greater saphenous vein at the mid calf.         Mayo Huang MD  [facility] Surgery Dept.

## 2021-06-23 NOTE — TELEPHONE ENCOUNTER
Patient Returning Call  Reason for call:  TCB  Information relayed to patient:  Let patient know below lab results and new medication.  Patient stated she will get medication and begin.  She will follow up in 3 months for appt.  Patient has additional questions:  No  If YES, what are your questions/concerns:  N/A  Okay to leave a detailed message?: No call back needed

## 2021-06-23 NOTE — TELEPHONE ENCOUNTER
RN cannot approve Refill Request    RN can NOT refill this medication refill too soon Last refilled on 9/17/18 x 1 yr. Last office visit: 1/10/2019 Racquel Dunn CNP Last Physical: 6/30/2017 Last MTM visit: Visit date not found Last visit same specialty: 1/10/2019 Racquel Dunn CNP.  Next visit within 3 mo: Visit date not found  Next physical within 3 mo: Visit date not found      [facility], [facility] Triage/Med Refill 2/9/2019    Requested Prescriptions   Pending Prescriptions Disp Refills     levothyroxine (SYNTHROID, LEVOTHROID) 200 MCG tablet [Pharmacy Med Name: LEVOTHYROXINE SOD 0.2MG(200MCG) TAB] 90 tablet 0     Sig: TAKE 1 TABLET(200 MCG) BY MOUTH DAILY    Thyroid Hormones Protocol Passed - 2/7/2019  3:15 AM       Passed - Provider visit in past 12 months or next 3 months    Last office visit with prescriber/PCP: 1/10/2019 Racquel Dunn CNP OR same dept: 1/10/2019 Racquel Dunn CNP OR same specialty: 1/10/2019 Racquel Dunn CNP  Last physical: 6/30/2017 Last MTM visit: Visit date not found   Next visit within 3 mo: Visit date not found  Next physical within 3 mo: Visit date not found  Prescriber OR PCP: Racquel Dunn CNP  Last diagnosis associated with med order: 1. Hypothyroidism, unspecified type  - levothyroxine (SYNTHROID, LEVOTHROID) 200 MCG tablet [Pharmacy Med Name: LEVOTHYROXINE SOD 0.2MG(200MCG) TAB]; TAKE 1 TABLET(200 MCG) BY MOUTH DAILY  Dispense: 90 tablet; Refill: 0    If protocol passes may refill for 12 months if within 3 months of last provider visit (or a total of 15 months).            Passed - TSH on file in past 12 months for patient age 12 & older    TSH   Date Value Ref Range Status   09/17/2018 0.42 0.30 - 5.00 uIU/mL Final

## 2021-06-23 NOTE — PROGRESS NOTES
Assessment and Plan:     1. Onychomycosis  Provided prescription for Penlac, use as directed.  Educated on its indications and side effects.   - ciclopirox (PENLAC) 8 % solution; Apply over nail and surrounding skin. Apply daily over previous coat. After seven (7) days, may remove with alcohol and continue cycle.  Dispense: 6.6 mL; Refill: 3    2. Type 2 diabetes mellitus without complication, without long-term current use of insulin (H)  Will check A1C and notify the patient of results.  If it remains elevated, may consider adding Glipizide.    - Glycosylated Hemoglobin A1c    3. Benign essential hypertension  This is controlled with Lisinopril-HCTZ.      4. Hypothyroidism, unspecified type  This is controlled with Levothyroxine.     5. Obesity (BMI 35.0-39.9) with comorbidity (H)  The following high BMI interventions were performed this visit: encouragement to exercise and lifestyle education regarding diet    The patient is content with the plan and will follow-up in 3 months for medication management or sooner with any further concerns.     Subjective:     Korin is a 63 y.o. female presenting to the clinic for a diabetes check.  Patient has multiple comorbidities including hypertension, diabetes, hypothyroidism, obesity.  Patient is taking metformin 1000 mg twice daily.  She is tolerating the medication well without any side effects.  She checks her blood sugars randomly throughout the day.  Her fasting blood sugars have been 150 or less.  Her random blood sugars after eating are all less than 200.  She is trying to consume a healthier diet.  She walks for exercise.  Her last eye exam was 2 years ago.  She denies any sores on her feet.  She has seen orthopedics and podiatry over the past year for a black toenail.  Patient has noticed that her right first and second toenails are thickened.  She was told by one provider that she needed an oral antifungal.  The podiatrist recommended over-the-counter Lotrimin or  "Lamisil.  She has found no relief.    Review of Systems: A complete 14 point review of systems was obtained and is negative or as stated in the history of present illness.    Social History     Socioeconomic History     Marital status: Single     Spouse name: Not on file     Number of children: Not on file     Years of education: Not on file     Highest education level: Not on file   Social Needs     Financial resource strain: Not on file     Food insecurity - worry: Not on file     Food insecurity - inability: Not on file     Transportation needs - medical: Not on file     Transportation needs - non-medical: Not on file   Occupational History     Not on file   Tobacco Use     Smoking status: Former Smoker     Smokeless tobacco: Never Used     Tobacco comment: quit in 1983   Substance and Sexual Activity     Alcohol use: Yes     Comment: couple per week     Drug use: No     Sexual activity: Not on file   Other Topics Concern     Not on file   Social History Narrative     Not on file       Active Ambulatory Problems     Diagnosis Date Noted     Obesity      Hypothyroidism      Cholelithiasis      Acute Pancreatitis      Menopause Has Occurred      Vitamin D Deficiency      Diabetes mellitus (H) 06/29/2015     Obesity (BMI 35.0-39.9) with comorbidity (H) 09/17/2018     Benign essential hypertension 11/15/2018     Resolved Ambulatory Problems     Diagnosis Date Noted     Atypical Chest Pain      Prediabetes      Joint Pain Fingers      Arthropathy Of The Ankle / Foot      Midback Pain      No Additional Past Medical History       Family History   Problem Relation Age of Onset     Heart disease Mother      Diabetes Mother      Hypertension Father      Atrial fibrillation Father        Objective:     /62   Pulse 72   Ht 5' 5\" (1.651 m)   Wt (!) 238 lb 11.2 oz (108.3 kg)   SpO2 98%   BMI 39.72 kg/m      Patient is alert, in no obvious distress.   Skin: Warm, dry.   Lungs:  Clear to auscultation. Respirations " even and unlabored.  No wheezing or rales noted.   Heart:  Regular rate and rhythm.  No murmurs, S3, S4, gallops, or rubs.    Abdomen: Soft, nontender.  No organomegaly. Bowel sounds normoactive. No guarding or masses noted.   Musculoskeletal:  She has thickened 1st and 2nd toenails of the right foot.  The second toenail is black in color near the distal tip. Both are yellow in color.

## 2021-06-24 NOTE — TELEPHONE ENCOUNTER
Patient was informed by [facility] that if she gets her medications filled through [facility],then she will have much lower co-pays or even $0 co-pays.        Refill Request  Did you contact pharmacy: Yes  Medication name:   Requested Prescriptions     Pending Prescriptions Disp Refills     blood glucose test strips 100 strip 3     Sig: Use 1 each As Directed daily. Dispense brand per patient's insurance at pharmacy discretion. (E11.9)     blood-glucose meter Misc 1 each 0     Sig: Use 1 Device As Directed daily.     ciclopirox (PENLAC) 8 % solution 6.6 mL 3     Sig: Apply over nail and surrounding skin. Apply daily over previous coat. After seven (7) days, may remove with alcohol and continue cycle.     glipiZIDE (GLUCOTROL XL) 5 MG 24 hr tablet 90 tablet 0     Sig: Take 1 tablet (5 mg total) by mouth daily with breakfast.     levothyroxine (SYNTHROID, LEVOTHROID) 200 MCG tablet 90 tablet 3     Sig: TAKE 1 TABLET(200 MCG) BY MOUTH DAILY     lisinopril-hydrochlorothiazide (PRINZIDE,ZESTORETIC) 10-12.5 mg per tablet 90 tablet 2     Sig: Take 1 tablet by mouth daily.     metFORMIN (GLUCOPHAGE) 500 MG tablet 360 tablet 1     Sig: TAKE 2 TABLETS(1000 MG) BY MOUTH TWICE DAILY WITH MEALS     simvastatin (ZOCOR) 10 MG tablet 90 tablet 2     Sig: TAKE 1 TABLET(10 MG) BY MOUTH BEDTIME     Who prescribed the medication: Racquel Dunn CNP  Pharmacy Name and Location: [facility]  Is patient out of medication: No  Patient notified refills processed in 72 hours:  yes  Okay to leave a detailed message: yes

## 2021-06-24 NOTE — TELEPHONE ENCOUNTER
Filled 3 months.  Last ov 1/10/19 note to see in 3 months.    Norma Mcfadden, RN, [facility] Nurse Triage/Med Refills RN

## 2021-06-25 NOTE — TELEPHONE ENCOUNTER
Yady is enrolled in the [facility] assistance program for Trulicity 0.75mg.    Is she due to a increase to 1.5mg?  If not, let me know when she is...  I need to change with [facility]'s program ane will require a new script.    .Thank you,    Graciela Oconnell  [place] [facility]   Pharmacy Assistance  [id number]

## 2021-06-26 ENCOUNTER — HEALTH MAINTENANCE LETTER (OUTPATIENT)
Age: 66
End: 2021-06-26

## 2021-06-27 NOTE — PROGRESS NOTES
Progress Notes by Mayo Huang MD at 4/17/2019 11:20 AM     Author: Mayo Huang MD Service: -- Author Type: Physician    Filed: 4/26/2019  8:32 AM Encounter Date: 4/17/2019 Status: Signed    : Mayo Huang MD (Physician)       [facility] Surgery Follow up    HPI:    64 y.o. year old female who returns for a follow up. Here for follow up of leg pain, swelling and chronic changes. Has worn compression for now for over three months. Still issues.     Allergies:Patient has no known allergies.    History reviewed. No pertinent past medical history.    Past Surgical History:   Procedure Laterality Date   ? CHOLECYSTECTOMY     ? DE APPENDECTOMY,RUPT APPENDX+ABSCESS      Description: Appendectomy For Ruptured Appendix;  Proc Date: 10/01/2005;   ? DE KNEE SCOPE,DIAGNOSTIC      Description: Arthroscopy Knee Left;  Recorded: 08/15/2007;   ? DE KNEE SCOPE,DIAGNOSTIC      Description: Arthroscopy Knee Left;  Proc Date: 08/01/2007;   ? DE REDUCTION OF LARGE BREAST      Description: Breast Surgery Reduction Procedure Bilateral;  Recorded: 08/15/2007;   ? DE SHLDR ARTHROSCOP,DIAGNOSTIC      Description: Arthroscopy Shoulder Right;  Recorded: 08/15/2007;  Comments: w/decompression   ? DE STAPEDECTOMY      Description: Stapedectomy;  Recorded: 08/15/2007;  Comments: assoc hearing loss---uses aid   ? REDUCTION MAMMAPLASTY Bilateral Around 2008       CURRENT MEDS:  Current Outpatient Medications on File Prior to Visit   Medication Sig Dispense Refill   ? aspirin 81 MG EC tablet Take 81 mg by mouth daily.     ? blood glucose test strips Use 1 each As Directed 2 (two) times a day. Dispense brand per patient's insurance at pharmacy discretion. (E11.9) 100 strip 5   ? blood-glucose meter Misc Use 1 Device As Directed daily. 1 each 0   ? ciclopirox (PENLAC) 8 % solution Apply over nail and surrounding skin. Apply daily over previous coat. After seven (7) days, may remove with alcohol and continue cycle. 6.6 mL 3   ?  "generic lancets (FINGERSTIX LANCETS) Dispense brand per patient's insurance at pharmacy discretion. 100 each 3   ? glipiZIDE (GLUCOTROL XL) 5 MG 24 hr tablet Take 1 tablet (5 mg total) by mouth daily with breakfast. 90 tablet 0   ? levothyroxine (SYNTHROID, LEVOTHROID) 200 MCG tablet TAKE 1 TABLET(200 MCG) BY MOUTH DAILY 90 tablet 0   ? lisinopril-hydrochlorothiazide (PRINZIDE,ZESTORETIC) 10-12.5 mg per tablet Take 1 tablet by mouth daily. 90 tablet 0   ? metFORMIN (GLUCOPHAGE) 500 MG tablet TAKE 2 TABLETS(1000 MG) BY MOUTH TWICE DAILY WITH MEALS 360 tablet 0   ? simvastatin (ZOCOR) 10 MG tablet TAKE 1 TABLET(10 MG) BY MOUTH BEDTIME 90 tablet 2     No current facility-administered medications on file prior to visit.        Family History   Problem Relation Age of Onset   ? Heart disease Mother    ? Diabetes Mother    ? Hypertension Father    ? Atrial fibrillation Father         reports that she has quit smoking. She has never used smokeless tobacco. She reports that she drinks alcohol. She reports that she does not use drugs.    Review of Systems:  Negative except lower ext changes from venous hyptertension pain and swelling. Otherwise twelve system of review is negative.      OBJECTIVE:  Vitals:    04/17/19 1124   BP: 108/68   Patient Site: Left Arm   Patient Position: Sitting   Cuff Size: Adult Regular   Pulse: 72   Resp: 18   Temp: 98.2  F (36.8  C)   TempSrc: Oral   Weight: (!) 239 lb (108.4 kg)   Height: 5' 5\" (1.651 m)     Body mass index is 39.77 kg/m .    EXAM:  GENERAL: This is a well-developed 64 y.o. female who appears her stated age  HEAD: normocephalic  HEENT: Pupils equal and reactive bilaterally  CARDIAC: RRR without murmur  CHEST/LUNG:  Clear to auscultation  ABDOMEN: Soft, nontender, nondistended, no masses    NEUROLOGIC: Focally intact, nonfocal  VASCULAR: Pulses intact, symmetrical upper and lower extremities.                         LABS:  Lab Results   Component Value Date    WBC 5.9 07/25/2013 "    HGB 13.2 07/12/2017    HCT 36.2 07/25/2013    MCV 90 07/25/2013     07/25/2013     INR/Prothrombin Time      Lab Results   Component Value Date    HGBA1C 7.3 (H) 04/11/2019     Lab Results   Component Value Date    ALT 36 09/17/2018    AST 21 09/17/2018    ALKPHOS 64 09/17/2018    BILITOT 0.5 09/17/2018        Images:     US Venous Insufficiency Legs Bilateral (Order [id number])   Imaging   Date: 1/2/2019 Department: [facility] Ultrasound [place] Released By: Alisha Hyman RDMS Authorizing: Mayo Huang MD   Study Result     [facility] RADIOLOGY  LOCATION: [facility]  DATE: 1/2/2019     EXAM: BILATERAL LOWER EXTREMITY DEEP AND SUPERFICIAL VENOUS DUPLEX ULTRASOUND WITH PHYSIOLOGIC TESTING     INDICATION: Symptomatic varicose veins. Assess for incompetent veins.     TECHNIQUE: Supine and upright ultrasound of the deep and superficial veins with Valsalva and compression augmentation maneuvers. Duplex imaging is performed utilizing gray-scale, two-dimensional images, color-flow imaging, Doppler waveform analysis, and   spectral Doppler imaging.      INCOMPETENCY CRITERIA: Deep vein reflux reported when greater than 1,000 ms flow reversal.  Superficial vein reflux reported when greater than 500 ms flow reversal.  vein reflux reported as greater than 350 ms flow reversal.     DEEP VEIN FINDINGS:     RIGHT LEG: The common femoral, profunda femoral, femoral, popliteal, and visualized calf veins are patent and compressible.     LEFT LEG:  The common femoral, profunda femoral, femoral, popliteal, and visualized calf veins are patent and compressible.      RIGHT SUPERFICIAL VEIN FINDINGS:  GREAT SAPHENOUS VEIN: Competent from the saphenofemoral junction to the proximal thigh.     Focal incompetence at the knee where the vein measures 2 mm in diameter. The vein is competent at the mid calf.     SMALL SAPHENOUS VEIN: Competent at the saphenopopliteal  junction. The remaining vein is not visualized.     No incompetent perforating veins or abnormal accessory veins identified.     LEFT SUPERFICIAL VEIN FINDINGS:  GREAT SAPHENOUS VEIN: Competent from the saphenopopliteal junction to the knee. The vein is incompetent at the mid calf where it measures 2 mm in diameter.     SMALL SAPHENOUS VEIN: Too small to evaluate.     No incompetent perforating veins or abnormal accessory veins identified.     IMPRESSION:   CONCLUSION:   1.  No deep venous thrombosis of either lower extremity.  2.  RIGHT LEG: The right superficial venous system is patent, with focal incompetence of the greater saphenous vein at the knee.  3.  LEFT LEG: The left superficial venous system is patent, with focal incompetence of the greater saphenous vein at the mid calf.         Assessment/Plan:   1. Symptomatic varicose veins of both lower extremities  No closure options.  Discussed options of stab phlebectomy of visualized and painful vein segments.  Also options of sclerotherapy discussed.  Compression, exercise and weight loss discussed.    - Compression stockings    2. Venous insufficiency of both lower extremities  same  - Compression stockings      No follow-ups on file.     Mayo Huang MD  [facility] Department of Surgery

## 2021-06-30 NOTE — PROGRESS NOTES
"Progress Notes by Gabi Brown AuD at 12/8/2020  9:20 AM     Author: Gabi Brown AuD Service: -- Author Type: Audiologist    Filed: 12/8/2020  9:50 AM Encounter Date: 12/8/2020 Status: Signed    : Gabi Brown AuD (Audiologist)       Audiology Report    Summary: Audiology visit completed. Please see audiogram below or in \"media\" tab for case history and results.     Plan:  The patient is returned to ENT for follow up. Return for retesting with ENT recommendation. Audiogram provided to patient.     Paul Fisher, [facility]  Clinical Audiologist  [place] #[id number]           "

## 2021-07-03 NOTE — ADDENDUM NOTE
Addendum Note by Mary Carmen Smith MLT at 10/25/2019  9:10 AM     Author: Mary Carmen Smith MLT Service: -- Author Type:     Filed: 10/25/2019  5:26 PM Encounter Date: 10/25/2019 Status: Signed    : Mary Carmen Smith MLT ()    Addended by: MARY CARMEN SMITH on: 10/25/2019 05:26 PM        Modules accepted: Orders

## 2021-07-03 NOTE — ADDENDUM NOTE
Addendum Note by Rosanne Henriquez at 10/25/2019  9:10 AM     Author: Rosanne Henriquez Service: -- Author Type:     Filed: 10/28/2019  1:38 PM Encounter Date: 10/25/2019 Status: Signed    : Rosanne Henriquez ()    Addended by: ROSANNE HENRIQUEZ on: 10/28/2019 01:38 PM        Modules accepted: Orders

## 2021-08-18 ENCOUNTER — TELEPHONE (OUTPATIENT)
Dept: FAMILY MEDICINE | Facility: CLINIC | Age: 66
End: 2021-08-18

## 2021-08-18 DIAGNOSIS — R60.9 EDEMA, UNSPECIFIED TYPE: ICD-10-CM

## 2021-08-18 DIAGNOSIS — E11.9 TYPE 2 DIABETES MELLITUS WITHOUT COMPLICATION, WITHOUT LONG-TERM CURRENT USE OF INSULIN (H): ICD-10-CM

## 2021-08-18 DIAGNOSIS — E03.9 HYPOTHYROIDISM, UNSPECIFIED TYPE: ICD-10-CM

## 2021-08-18 DIAGNOSIS — I10 BENIGN ESSENTIAL HYPERTENSION: Primary | ICD-10-CM

## 2021-08-18 DIAGNOSIS — E11.9 DIABETES MELLITUS, TYPE 2 (H): ICD-10-CM

## 2021-08-18 DIAGNOSIS — E78.2 MIXED HYPERLIPIDEMIA: ICD-10-CM

## 2021-08-18 RX ORDER — LEVOTHYROXINE SODIUM 175 UG/1
175 TABLET ORAL DAILY
Qty: 90 TABLET | Refills: 3 | Status: SHIPPED | OUTPATIENT
Start: 2021-08-18 | End: 2022-06-23

## 2021-08-18 RX ORDER — LISINOPRIL/HYDROCHLOROTHIAZIDE 10-12.5 MG
1 TABLET ORAL DAILY
Qty: 90 TABLET | Refills: 3 | Status: SHIPPED | OUTPATIENT
Start: 2021-08-18 | End: 2022-06-23

## 2021-08-18 RX ORDER — SIMVASTATIN 10 MG
10 TABLET ORAL AT BEDTIME
Qty: 90 TABLET | Refills: 3 | Status: SHIPPED | OUTPATIENT
Start: 2021-08-18 | End: 2022-06-23

## 2021-08-18 NOTE — TELEPHONE ENCOUNTER
FYI~ A refill has been made to the  assistance program for Trulicity 0.75mg.    A 120 day supply of Trulicity 0.75mg will be delivered to [facility] on 8/18.    Thank you,    Serina Stringer  Prescription Assistance

## 2021-08-18 NOTE — TELEPHONE ENCOUNTER
"Refill Request    Last Visit: 5/20/21    Subjective:     Korin is a 66 y.o. female presenting to the clinic for her diabetes management.  Patient is currently taking Metformin 1000 mg twice daily and injecting Trulicity 0.75 mg weekly.  She is tolerating the medication well without any side effects.  Last A1c was 7.6% on 11/3/2020.  She is consuming a healthy diet and has been walking for exercise.  Her significant other recently had gastric bypass surgery, so this has changed her diet as well.  Patient randomly checks her blood sugars and they have been ranging 110-1 30.  She is due for an eye exam.  She denies any sores on her feet.  She has been experiencing lower extremity cramping for multiple years.  Patient states she will wake up with a \"charley horse\" and needs to walk around the house to improve her symptoms.  She denies recent travel. She has seen the vascular specialist in the past.  She has a history of hypertension which is controlled with lisinopril-hydrochlorothiazide.  She continues simvastatin for hyperlipidemia.      Did you contact pharmacy:  Yes; New Medicare Advantage Plan and Pharmacy    Medication name: Pending Prescriptions:                       Disp   Refills    levothyroxine (SYNTHROID/LEVOTHROID) 175 *90 tab*3            Sig: Take 1 tablet (175 mcg) by mouth daily    lisinopril-hydrochlorothiazide (ZESTORETI*90 tab*3            Sig: Take 1 tablet by mouth daily    simvastatin (ZOCOR) 10 MG tablet          90 tab*3            Sig: Take 1 tablet (10 mg) by mouth At Bedtime    metFORMIN (GLUCOPHAGE) 500 MG tablet      360 ta*3            Sig: [METFORMIN (GLUCOPHAGE) 500 MG TABLET] TAKE 2           TABLETS BY MOUTH TWO TIMES DAILY WITH MEALS    Pharmacy Name and Location: [facility]     Is patient out of medication: No, but getting close to being out    Patient notified refill processed in 72 hours: Yes    Okay to leave a detailed message: Yes    "

## 2021-10-16 ENCOUNTER — HEALTH MAINTENANCE LETTER (OUTPATIENT)
Age: 66
End: 2021-10-16

## 2021-11-22 ENCOUNTER — MYC MEDICAL ADVICE (OUTPATIENT)
Dept: PHARMACY | Facility: CLINIC | Age: 66
End: 2021-11-22
Payer: COMMERCIAL

## 2021-11-22 DIAGNOSIS — E03.9 HYPOTHYROIDISM, UNSPECIFIED TYPE: Primary | ICD-10-CM

## 2021-11-22 DIAGNOSIS — E11.9 DIABETES MELLITUS (H): ICD-10-CM

## 2021-12-11 ENCOUNTER — HEALTH MAINTENANCE LETTER (OUTPATIENT)
Age: 66
End: 2021-12-11

## 2021-12-27 ENCOUNTER — LAB (OUTPATIENT)
Dept: LAB | Facility: CLINIC | Age: 66
End: 2021-12-27

## 2021-12-27 DIAGNOSIS — E11.9 DIABETES MELLITUS (H): ICD-10-CM

## 2021-12-27 DIAGNOSIS — E03.9 HYPOTHYROIDISM, UNSPECIFIED TYPE: ICD-10-CM

## 2021-12-27 LAB
ANION GAP SERPL CALCULATED.3IONS-SCNC: 9 MMOL/L (ref 5–18)
BUN SERPL-MCNC: 12 MG/DL (ref 8–22)
CALCIUM SERPL-MCNC: 9.4 MG/DL (ref 8.5–10.5)
CHLORIDE BLD-SCNC: 104 MMOL/L (ref 98–107)
CHOLEST SERPL-MCNC: 115 MG/DL
CO2 SERPL-SCNC: 29 MMOL/L (ref 22–31)
CREAT SERPL-MCNC: 0.77 MG/DL (ref 0.6–1.1)
CREAT UR-MCNC: 216 MG/DL
FASTING STATUS PATIENT QL REPORTED: NO
GFR SERPL CREATININE-BSD FRML MDRD: 84 ML/MIN/1.73M2
GLUCOSE BLD-MCNC: 144 MG/DL (ref 70–125)
HBA1C MFR BLD: 6.8 % (ref 0–5.6)
HDLC SERPL-MCNC: 42 MG/DL
LDLC SERPL CALC-MCNC: 47 MG/DL
MICROALBUMIN UR-MCNC: 4.29 MG/DL (ref 0–1.99)
MICROALBUMIN/CREAT UR: 19.9 MG/G CR
POTASSIUM BLD-SCNC: 4.3 MMOL/L (ref 3.5–5)
SODIUM SERPL-SCNC: 142 MMOL/L (ref 136–145)
TRIGL SERPL-MCNC: 131 MG/DL
TSH SERPL DL<=0.005 MIU/L-ACNC: 0.3 UIU/ML (ref 0.3–5)

## 2021-12-27 PROCEDURE — 36415 COLL VENOUS BLD VENIPUNCTURE: CPT

## 2021-12-27 PROCEDURE — 82043 UR ALBUMIN QUANTITATIVE: CPT

## 2021-12-27 PROCEDURE — 80048 BASIC METABOLIC PNL TOTAL CA: CPT

## 2021-12-27 PROCEDURE — 84443 ASSAY THYROID STIM HORMONE: CPT

## 2021-12-27 PROCEDURE — 83036 HEMOGLOBIN GLYCOSYLATED A1C: CPT

## 2021-12-27 PROCEDURE — 80061 LIPID PANEL: CPT

## 2022-01-18 VITALS
BODY MASS INDEX: 35.78 KG/M2 | HEART RATE: 80 BPM | SYSTOLIC BLOOD PRESSURE: 108 MMHG | DIASTOLIC BLOOD PRESSURE: 60 MMHG | WEIGHT: 220 LBS

## 2022-01-18 VITALS
BODY MASS INDEX: 36.16 KG/M2 | WEIGHT: 225 LBS | DIASTOLIC BLOOD PRESSURE: 62 MMHG | OXYGEN SATURATION: 97 % | HEIGHT: 66 IN | HEART RATE: 78 BPM | SYSTOLIC BLOOD PRESSURE: 110 MMHG

## 2022-02-25 ENCOUNTER — TRANSFERRED RECORDS (OUTPATIENT)
Dept: HEALTH INFORMATION MANAGEMENT | Facility: CLINIC | Age: 67
End: 2022-02-25

## 2022-02-25 LAB — RETINOPATHY: NORMAL

## 2022-03-16 ENCOUNTER — TRANSFERRED RECORDS (OUTPATIENT)
Dept: HEALTH INFORMATION MANAGEMENT | Facility: CLINIC | Age: 67
End: 2022-03-16
Payer: COMMERCIAL

## 2022-06-03 ENCOUNTER — LAB (OUTPATIENT)
Dept: LAB | Facility: CLINIC | Age: 67
End: 2022-06-03
Payer: COMMERCIAL

## 2022-06-03 DIAGNOSIS — E11.9 TYPE 2 DIABETES MELLITUS WITHOUT COMPLICATION, WITHOUT LONG-TERM CURRENT USE OF INSULIN (H): ICD-10-CM

## 2022-06-03 LAB — HBA1C MFR BLD: 7.2 % (ref 0–5.6)

## 2022-06-03 PROCEDURE — 83036 HEMOGLOBIN GLYCOSYLATED A1C: CPT

## 2022-06-03 PROCEDURE — 36415 COLL VENOUS BLD VENIPUNCTURE: CPT

## 2022-06-23 ENCOUNTER — OFFICE VISIT (OUTPATIENT)
Dept: FAMILY MEDICINE | Facility: CLINIC | Age: 67
End: 2022-06-23
Payer: COMMERCIAL

## 2022-06-23 VITALS
OXYGEN SATURATION: 98 % | DIASTOLIC BLOOD PRESSURE: 62 MMHG | RESPIRATION RATE: 20 BRPM | WEIGHT: 221.7 LBS | TEMPERATURE: 96.4 F | HEART RATE: 72 BPM | HEIGHT: 66 IN | BODY MASS INDEX: 35.63 KG/M2 | SYSTOLIC BLOOD PRESSURE: 120 MMHG

## 2022-06-23 DIAGNOSIS — Z00.00 WELCOME TO MEDICARE PREVENTIVE VISIT: Primary | ICD-10-CM

## 2022-06-23 DIAGNOSIS — R60.9 EDEMA, UNSPECIFIED TYPE: ICD-10-CM

## 2022-06-23 DIAGNOSIS — E03.9 HYPOTHYROIDISM, UNSPECIFIED TYPE: ICD-10-CM

## 2022-06-23 DIAGNOSIS — E11.9 TYPE 2 DIABETES MELLITUS WITHOUT COMPLICATION, WITHOUT LONG-TERM CURRENT USE OF INSULIN (H): ICD-10-CM

## 2022-06-23 DIAGNOSIS — I10 BENIGN ESSENTIAL HYPERTENSION: ICD-10-CM

## 2022-06-23 DIAGNOSIS — Z12.11 SCREEN FOR COLON CANCER: ICD-10-CM

## 2022-06-23 DIAGNOSIS — Z79.899 MEDICATION MANAGEMENT: ICD-10-CM

## 2022-06-23 DIAGNOSIS — R19.8 ALTERED BOWEL FUNCTION: ICD-10-CM

## 2022-06-23 DIAGNOSIS — Z12.31 VISIT FOR SCREENING MAMMOGRAM: ICD-10-CM

## 2022-06-23 DIAGNOSIS — E66.812 CLASS 2 SEVERE OBESITY DUE TO EXCESS CALORIES WITH SERIOUS COMORBIDITY AND BODY MASS INDEX (BMI) OF 37.0 TO 37.9 IN ADULT (H): ICD-10-CM

## 2022-06-23 DIAGNOSIS — E78.2 MIXED HYPERLIPIDEMIA: ICD-10-CM

## 2022-06-23 DIAGNOSIS — Z23 HIGH PRIORITY FOR 2019-NCOV VACCINE: ICD-10-CM

## 2022-06-23 DIAGNOSIS — E66.01 CLASS 2 SEVERE OBESITY DUE TO EXCESS CALORIES WITH SERIOUS COMORBIDITY AND BODY MASS INDEX (BMI) OF 37.0 TO 37.9 IN ADULT (H): ICD-10-CM

## 2022-06-23 LAB
ALBUMIN SERPL-MCNC: 3.9 G/DL (ref 3.5–5)
ALBUMIN UR-MCNC: NEGATIVE MG/DL
ALP SERPL-CCNC: 59 U/L (ref 45–120)
ALT SERPL W P-5'-P-CCNC: 38 U/L (ref 0–45)
ANION GAP SERPL CALCULATED.3IONS-SCNC: 7 MMOL/L (ref 5–18)
APPEARANCE UR: CLEAR
AST SERPL W P-5'-P-CCNC: 25 U/L (ref 0–40)
BACTERIA #/AREA URNS HPF: ABNORMAL /HPF
BILIRUB SERPL-MCNC: 0.4 MG/DL (ref 0–1)
BILIRUB UR QL STRIP: NEGATIVE
BUN SERPL-MCNC: 12 MG/DL (ref 8–22)
CALCIUM SERPL-MCNC: 9.7 MG/DL (ref 8.5–10.5)
CHLORIDE BLD-SCNC: 102 MMOL/L (ref 98–107)
CO2 SERPL-SCNC: 30 MMOL/L (ref 22–31)
COLOR UR AUTO: YELLOW
CREAT SERPL-MCNC: 0.75 MG/DL (ref 0.6–1.1)
ERYTHROCYTE [DISTWIDTH] IN BLOOD BY AUTOMATED COUNT: 12.9 % (ref 10–15)
GFR SERPL CREATININE-BSD FRML MDRD: 86 ML/MIN/1.73M2
GLUCOSE BLD-MCNC: 114 MG/DL (ref 70–125)
GLUCOSE UR STRIP-MCNC: NEGATIVE MG/DL
HCT VFR BLD AUTO: 39.7 % (ref 35–47)
HGB BLD-MCNC: 13.1 G/DL (ref 11.7–15.7)
HGB UR QL STRIP: NEGATIVE
KETONES UR STRIP-MCNC: NEGATIVE MG/DL
LEUKOCYTE ESTERASE UR QL STRIP: ABNORMAL
MCH RBC QN AUTO: 29.6 PG (ref 26.5–33)
MCHC RBC AUTO-ENTMCNC: 33 G/DL (ref 31.5–36.5)
MCV RBC AUTO: 90 FL (ref 78–100)
NITRATE UR QL: NEGATIVE
PH UR STRIP: 5.5 [PH] (ref 5–8)
PLATELET # BLD AUTO: 309 10E3/UL (ref 150–450)
POTASSIUM BLD-SCNC: 4.3 MMOL/L (ref 3.5–5)
PROT SERPL-MCNC: 6.8 G/DL (ref 6–8)
RBC # BLD AUTO: 4.42 10E6/UL (ref 3.8–5.2)
RBC #/AREA URNS AUTO: ABNORMAL /HPF
SODIUM SERPL-SCNC: 139 MMOL/L (ref 136–145)
SP GR UR STRIP: 1.02 (ref 1–1.03)
SQUAMOUS #/AREA URNS AUTO: ABNORMAL /LPF
UROBILINOGEN UR STRIP-ACNC: 0.2 E.U./DL
WBC # BLD AUTO: 7.1 10E3/UL (ref 4–11)
WBC #/AREA URNS AUTO: ABNORMAL /HPF

## 2022-06-23 PROCEDURE — 93005 ELECTROCARDIOGRAM TRACING: CPT | Performed by: NURSE PRACTITIONER

## 2022-06-23 PROCEDURE — 85027 COMPLETE CBC AUTOMATED: CPT | Performed by: NURSE PRACTITIONER

## 2022-06-23 PROCEDURE — 99214 OFFICE O/P EST MOD 30 MIN: CPT | Mod: 25 | Performed by: NURSE PRACTITIONER

## 2022-06-23 PROCEDURE — G0405 EKG INTERPRET & REPORT PREVE: HCPCS | Performed by: INTERNAL MEDICINE

## 2022-06-23 PROCEDURE — 81001 URINALYSIS AUTO W/SCOPE: CPT | Performed by: NURSE PRACTITIONER

## 2022-06-23 PROCEDURE — 0064A COVID-19,PF,MODERNA (18+ YRS BOOSTER .25ML): CPT | Performed by: NURSE PRACTITIONER

## 2022-06-23 PROCEDURE — 80053 COMPREHEN METABOLIC PANEL: CPT | Performed by: NURSE PRACTITIONER

## 2022-06-23 PROCEDURE — 91306 COVID-19,PF,MODERNA (18+ YRS BOOSTER .25ML): CPT | Performed by: NURSE PRACTITIONER

## 2022-06-23 PROCEDURE — 36415 COLL VENOUS BLD VENIPUNCTURE: CPT | Performed by: NURSE PRACTITIONER

## 2022-06-23 PROCEDURE — 99397 PER PM REEVAL EST PAT 65+ YR: CPT | Mod: 25 | Performed by: NURSE PRACTITIONER

## 2022-06-23 RX ORDER — LEVOTHYROXINE SODIUM 175 UG/1
175 TABLET ORAL DAILY
Qty: 90 TABLET | Refills: 3 | Status: SHIPPED | OUTPATIENT
Start: 2022-06-23 | End: 2023-06-02

## 2022-06-23 RX ORDER — LISINOPRIL/HYDROCHLOROTHIAZIDE 10-12.5 MG
1 TABLET ORAL DAILY
Qty: 90 TABLET | Refills: 3 | Status: SHIPPED | OUTPATIENT
Start: 2022-06-23 | End: 2023-06-02

## 2022-06-23 RX ORDER — SIMVASTATIN 10 MG
10 TABLET ORAL AT BEDTIME
Qty: 90 TABLET | Refills: 3 | Status: SHIPPED | OUTPATIENT
Start: 2022-06-23 | End: 2023-06-02

## 2022-06-23 ASSESSMENT — ENCOUNTER SYMPTOMS
HEARTBURN: 0
FEVER: 0
MYALGIAS: 0
PARESTHESIAS: 0
NERVOUS/ANXIOUS: 0
NAUSEA: 0
COUGH: 0
BREAST MASS: 0
ARTHRALGIAS: 1
FREQUENCY: 0
WEAKNESS: 0
DYSURIA: 0
SORE THROAT: 0
SHORTNESS OF BREATH: 0
HEMATURIA: 0
HEADACHES: 0
PALPITATIONS: 0
EYE PAIN: 0
CONSTIPATION: 0
JOINT SWELLING: 1
HEMATOCHEZIA: 0
CHILLS: 0
ABDOMINAL PAIN: 1
DIARRHEA: 1
DIZZINESS: 0

## 2022-06-23 ASSESSMENT — PAIN SCALES - GENERAL: PAINLEVEL: MILD PAIN (2)

## 2022-06-23 ASSESSMENT — ACTIVITIES OF DAILY LIVING (ADL): CURRENT_FUNCTION: NO ASSISTANCE NEEDED

## 2022-06-23 NOTE — PROGRESS NOTES
SUBJECTIVE:   Korin Sharma is a 67 year old female who presents for Preventive Visit.  Patient has been a relationship with a female for 30 years.  She has 2 sons ages 18 and 21 who are adopted.  She has no concerns for STDs.  Her last menstrual period was at the age of 49.  She has not had any vaginal bleeding since.  She does have type 2 diabetes and is taking metformin 1000 mg twice daily.  Last A1c was 7.2% on 6/3/2022.  She checks her blood sugars once daily.  She discontinued Trulicity because she cannot afford it.  She is trying to consume less alcohol.  She typically consumes 1-2 beverages per week.  She recently bought a house and has been moving.  This has been assisting her with exercise.  She has hypertension which is controlled lisinopril-hydrochlorothiazide 10-12.5 mg daily.  She has hypothyroidism which is controlled with levothyroxine 175 mcg daily.  Last TSH was 0.30 on 12/27/2021.  She is taking simvastatin 10 mg daily for lipid management.  Last cholesterol check was on 12/27/2021 with a total cholesterol of 115, triglycerides 131, HDL 42, LDL 47.  Patient is concerned of a change in bowel pattern.  Over the past 2 months, she has had softer bowel movements.  She complains of mild cramping prior to the bowel movements.  She has noticed an increase in flatulence.  She is having a bowel movement once to twice daily.  Occasionally, the second bowel movement is runny in consistency.  She has not traveled recently.  She denies any recent antibiotic use.  She has not been swimming.      Patient has been advised of split billing requirements and indicates understanding: Yes  Are you in the first 12 months of your Medicare coverage?  No    Healthy Habits:     In general, how would you rate your overall health?  Good    Frequency of exercise:  1 day/week    Duration of exercise:  15-30 minutes    Do you usually eat at least 4 servings of fruit and vegetables a day, include whole grains    & fiber and  "avoid regularly eating high fat or \"junk\" foods?  No    Taking medications regularly:  Yes    Medication side effects:  None    Ability to successfully perform activities of daily living:  No assistance needed    Home Safety:  No safety concerns identified    Hearing Impairment:  Difficulty following a conversation in a noisy restaurant or crowded room, difficult to understand a speaker at a public meeting or [employer] service and need to ask people to speak up or repeat themselves    In the past 6 months, have you been bothered by leaking of urine?  No    In general, how would you rate your overall mental or emotional health?  Excellent      PHQ-2 Total Score: 0    Additional concerns today:  No    Do you feel safe in your environment? Yes    Have you ever done Advance Care Planning? (For example, a Health Directive, POLST, or a discussion with a medical provider or your loved ones about your wishes): Yes, patient states has an Advance Care Planning document and will bring a copy to the clinic.      Fall risk  Fallen 2 or more times in the past year?: No  Any fall with injury in the past year?: No    Cognitive Screening   1) Repeat 3 items (Leader, Season, Table)    2) Clock draw: NORMAL  3) 3 item recall: Recalls 1 object   Results: NORMAL clock, 1-2 items recalled: COGNITIVE IMPAIRMENT LESS LIKELY    Mini-CogTM Copyright S Chasity. Licensed by the author for use in [facility]; reprinted with permission (brandon@.[facility]). All rights reserved.      Do you have sleep apnea, excessive snoring or daytime drowsiness?: no    Reviewed and updated as needed this visit by clinical staff   Tobacco  Allergies  Meds    Surg Hx  Fam Hx            Reviewed and updated as needed this visit by Provider   Tobacco      Surg Hx  Fam Hx           Social History     Tobacco Use     Smoking status: Former Smoker     Smokeless tobacco: Never Used     Tobacco comment: quit in 1983   Substance Use Topics     Alcohol use: " Yes     Comment: Alcoholic Drinks/day: couple per week     If you drink alcohol do you typically have >3 drinks per day or >7 drinks per week? No    Alcohol Use 6/23/2022   Prescreen: >3 drinks/day or >7 drinks/week? No   No flowsheet data found.      Current providers sharing in care for this patient include:   Patient Care Team:  Racquel Dunn APRN CNP as PCP - General (Family Practice)  Carine Landis PharmD as Pharmacist (Pharmacist)  Racquel Dunn APRN CNP as Assigned PCP  Lusi Siddiqui MD as Assigned Surgical Provider    The following health maintenance items are reviewed in Epic and correct as of today:  Health Maintenance Due   Topic Date Due     DIABETIC FOOT EXAM  Never done     ANNUAL REVIEW OF HM ORDERS  Never done     EYE EXAM  Never done     HEPATITIS C SCREENING  Never done     ZOSTER IMMUNIZATION (1 of 2) Never done     LUNG CANCER SCREENING  Never done     MAMMO SCREENING  10/09/2020     COLORECTAL CANCER SCREENING  10/24/2021     COVID-19 Vaccine (4 - Booster for Moderna series) 04/06/2022       Breast CA Risk Assessment (FHS-7) 6/23/2022   Do you have a family history of breast, colon, or ovarian cancer? No / Unknown     Pertinent mammograms are reviewed under the imaging tab.    Review of Systems   Constitutional: Negative for chills and fever.   HENT: Positive for ear pain and hearing loss. Negative for congestion and sore throat.    Eyes: Negative for pain and visual disturbance.   Respiratory: Negative for cough and shortness of breath.    Cardiovascular: Positive for peripheral edema. Negative for chest pain and palpitations.   Gastrointestinal: Positive for abdominal pain and diarrhea. Negative for constipation, heartburn, hematochezia and nausea.   Breasts:  Negative for tenderness, breast mass and discharge.   Genitourinary: Negative for dysuria, frequency, genital sores, hematuria, pelvic pain, urgency, vaginal bleeding and vaginal discharge.   Musculoskeletal: Positive for  "arthralgias and joint swelling. Negative for myalgias.   Skin: Negative for rash.   Neurological: Negative for dizziness, weakness, headaches and paresthesias.   Psychiatric/Behavioral: Negative for mood changes. The patient is not nervous/anxious.      Constitutional, HEENT, cardiovascular, pulmonary, gi and gu systems are negative, except as otherwise noted.    OBJECTIVE:   /62 (BP Location: Right arm, Patient Position: Sitting, Cuff Size: Adult Regular)   Pulse 72   Temp (!) 96.4  F (35.8  C)   Resp 20   Ht 1.676 m (5' 6\")   Wt 100.6 kg (221 lb 11.2 oz)   SpO2 98%   BMI 35.78 kg/m   Estimated body mass index is 35.78 kg/m  as calculated from the following:    Height as of this encounter: 1.676 m (5' 6\").    Weight as of this encounter: 100.6 kg (221 lb 11.2 oz).  Physical Exam  GENERAL: healthy, alert and no distress  EYES: Eyes grossly normal to inspection, PERRL and conjunctivae and sclerae normal  HENT: ear canals and TM's normal, nose and mouth without ulcers or lesions  NECK: no adenopathy, no asymmetry, masses, or scars and thyroid normal to palpation  RESP: lungs clear to auscultation - no rales, rhonchi or wheezes  BREAST: deferred per patient   CV: regular rate and rhythm, normal S1 S2, no S3 or S4, no murmur, click or rub, no peripheral edema and peripheral pulses strong  ABDOMEN: soft, nontender, no hepatosplenomegaly, no masses and bowel sounds normal  MS: no gross musculoskeletal defects noted, no edema  SKIN: no suspicious lesions or rashes  NEURO: Normal strength and tone, mentation intact and speech normal  PSYCH: mentation appears normal, affect normal/bright    I ordered and personally reviewed an EKG showing sinus rhythm with premature supraventricular complexes, nonspecific ST abnormality.        ASSESSMENT / PLAN:   Welcome to Medicare preventive visit  Recommend consuming a healthy diet and exercising.  She declines hepatitis C screening.  She is due for mammogram.  Cologuard " ordered.  She will consider obtaining the shingles vaccine at the pharmacy.  She does not qualify for lung cancer screening.  COVID booster provided.  - CBC with platelets  - UA Macro with Reflex to Micro and Culture - lab collect  - EKG 12-lead, tracing only    Visit for screening mammogram  - MA SCREENING DIGITAL BILAT - Future  (s+30)    Screen for colon cancer  - MOE(EXACT SCIENCES)    High priority for 2019-nCoV vaccine  - COVID-19,PF,MODERNA (18+ Yrs BOOSTER .25mL)    Type 2 diabetes mellitus without complication, without long-term current use of insulin (H)  Last A1c was 7.2%.  She cannot afford Trulicity at this time.  She continues metformin.  She feels as though she can make some diet changes.  - metFORMIN (GLUCOPHAGE) 500 MG tablet  Dispense: 360 tablet; Refill: 1    Altered bowel function  Will refer to gastroenterology for further evaluation.  - Adult GI  Referral - Consult Only    Hypothyroidism, unspecified type  This is controlled.  She continues levothyroxine.  - levothyroxine (SYNTHROID/LEVOTHROID) 175 MCG tablet  Dispense: 90 tablet; Refill: 3    Mixed hyperlipidemia  Goal LDL is less than 100.  She will continue to make healthy lifestyle changes.  She continues simvastatin.  - simvastatin (ZOCOR) 10 MG tablet  Dispense: 90 tablet; Refill: 3    Edema, unspecified type  This is controlled with lisinopril-hydrochlorothiazide.  - lisinopril-hydrochlorothiazide (ZESTORETIC) 10-12.5 MG tablet  Dispense: 90 tablet; Refill: 3    Benign essential hypertension  This is controlled lisinopril-hydrochlorothiazide.  - lisinopril-hydrochlorothiazide (ZESTORETIC) 10-12.5 MG tablet  Dispense: 90 tablet; Refill: 3    Class 2 severe obesity due to excess calories with serious comorbidity and body mass index (BMI) of 37.0 to 37.9 in adult (H)  Recommend consuming a healthy diet and exercising.    Medication management  - Comprehensive metabolic panel (BMP + Alb, Alk Phos, ALT, AST, Total. Bili,  "TP)      The patient is content with the plan and will follow-up in 6 months for medication management or sooner with any further concerns.       COUNSELING:  Reviewed preventive health counseling, as reflected in patient instructions       Regular exercise       Healthy diet/nutrition       Vision screening       Hearing screening       Dental care       Osteoporosis prevention/bone health       Colon cancer screening    Estimated body mass index is 35.78 kg/m  as calculated from the following:    Height as of this encounter: 1.676 m (5' 6\").    Weight as of this encounter: 100.6 kg (221 lb 11.2 oz).    Weight management plan: Discussed healthy diet and exercise guidelines    She reports that she has quit smoking. She has never used smokeless tobacco.      Appropriate preventive services were discussed with this patient, including applicable screening as appropriate for cardiovascular disease, diabetes, osteopenia/osteoporosis, and glaucoma.  As appropriate for age/gender, discussed screening for colorectal cancer, prostate cancer, breast cancer, and cervical cancer. Checklist reviewing preventive services available has been given to the patient.    Reviewed patients plan of care and provided an AVS. The Basic Care Plan (routine screening as documented in Health Maintenance) for [facility] meets the Care Plan requirement. This Care Plan has been established and reviewed with the Patient.    Counseling Resources:  ATP IV Guidelines  Pooled Cohorts Equation Calculator  Breast Cancer Risk Calculator  Breast Cancer: Medication to Reduce Risk  FRAX Risk Assessment  ICSI Preventive Guidelines  Dietary Guidelines for Americans, 2010  USDA's MyPlate  ASA Prophylaxis  Lung CA Screening    ASHLEY Chavez [facility]    Identified Health Risks:  "

## 2022-06-24 LAB
ATRIAL RATE - MUSE: 68 BPM
DIASTOLIC BLOOD PRESSURE - MUSE: NORMAL MMHG
INTERPRETATION ECG - MUSE: NORMAL
P AXIS - MUSE: 24 DEGREES
PR INTERVAL - MUSE: 134 MS
QRS DURATION - MUSE: 80 MS
QT - MUSE: 376 MS
QTC - MUSE: 399 MS
R AXIS - MUSE: 40 DEGREES
SYSTOLIC BLOOD PRESSURE - MUSE: NORMAL MMHG
T AXIS - MUSE: 34 DEGREES
VENTRICULAR RATE- MUSE: 68 BPM

## 2022-09-03 LAB — NONINV COLON CA DNA+OCC BLD SCRN STL QL: NEGATIVE

## 2022-10-01 ENCOUNTER — HEALTH MAINTENANCE LETTER (OUTPATIENT)
Age: 67
End: 2022-10-01

## 2023-02-04 ENCOUNTER — HEALTH MAINTENANCE LETTER (OUTPATIENT)
Age: 68
End: 2023-02-04

## 2023-03-13 ENCOUNTER — OFFICE VISIT (OUTPATIENT)
Dept: FAMILY MEDICINE | Facility: CLINIC | Age: 68
End: 2023-03-13
Attending: NURSE PRACTITIONER
Payer: COMMERCIAL

## 2023-03-13 VITALS
WEIGHT: 225 LBS | HEIGHT: 66 IN | HEART RATE: 80 BPM | SYSTOLIC BLOOD PRESSURE: 120 MMHG | BODY MASS INDEX: 36.16 KG/M2 | TEMPERATURE: 98.5 F | RESPIRATION RATE: 25 BRPM | DIASTOLIC BLOOD PRESSURE: 60 MMHG

## 2023-03-13 DIAGNOSIS — E78.1 HYPERTRIGLYCERIDEMIA: ICD-10-CM

## 2023-03-13 DIAGNOSIS — E66.812 CLASS 2 SEVERE OBESITY DUE TO EXCESS CALORIES WITH SERIOUS COMORBIDITY AND BODY MASS INDEX (BMI) OF 36.0 TO 36.9 IN ADULT (H): ICD-10-CM

## 2023-03-13 DIAGNOSIS — Z23 HIGH PRIORITY FOR 2019-NCOV VACCINE: ICD-10-CM

## 2023-03-13 DIAGNOSIS — E66.01 CLASS 2 SEVERE OBESITY DUE TO EXCESS CALORIES WITH SERIOUS COMORBIDITY AND BODY MASS INDEX (BMI) OF 36.0 TO 36.9 IN ADULT (H): ICD-10-CM

## 2023-03-13 DIAGNOSIS — E11.9 TYPE 2 DIABETES MELLITUS WITHOUT COMPLICATION, WITHOUT LONG-TERM CURRENT USE OF INSULIN (H): Primary | ICD-10-CM

## 2023-03-13 DIAGNOSIS — E03.9 HYPOTHYROIDISM, UNSPECIFIED TYPE: ICD-10-CM

## 2023-03-13 DIAGNOSIS — Z12.31 ENCOUNTER FOR SCREENING MAMMOGRAM FOR BREAST CANCER: ICD-10-CM

## 2023-03-13 LAB
CHOLEST SERPL-MCNC: 114 MG/DL
CREAT UR-MCNC: 117 MG/DL
HBA1C MFR BLD: 8.7 % (ref 0–5.6)
HDLC SERPL-MCNC: 37 MG/DL
LDLC SERPL CALC-MCNC: 46 MG/DL
MICROALBUMIN UR-MCNC: <12 MG/L
MICROALBUMIN/CREAT UR: NORMAL MG/G{CREAT}
NONHDLC SERPL-MCNC: 77 MG/DL
TRIGL SERPL-MCNC: 156 MG/DL
TSH SERPL DL<=0.005 MIU/L-ACNC: 1.49 UIU/ML (ref 0.3–4.2)

## 2023-03-13 PROCEDURE — 83036 HEMOGLOBIN GLYCOSYLATED A1C: CPT | Performed by: NURSE PRACTITIONER

## 2023-03-13 PROCEDURE — 84443 ASSAY THYROID STIM HORMONE: CPT | Performed by: NURSE PRACTITIONER

## 2023-03-13 PROCEDURE — 0134A COVID-19 VACCINE BIVALENT BOOSTER 18+ (MODERNA): CPT | Performed by: NURSE PRACTITIONER

## 2023-03-13 PROCEDURE — 91313 COVID-19 VACCINE BIVALENT BOOSTER 18+ (MODERNA): CPT | Performed by: NURSE PRACTITIONER

## 2023-03-13 PROCEDURE — 82570 ASSAY OF URINE CREATININE: CPT | Performed by: NURSE PRACTITIONER

## 2023-03-13 PROCEDURE — 99214 OFFICE O/P EST MOD 30 MIN: CPT | Performed by: NURSE PRACTITIONER

## 2023-03-13 PROCEDURE — 80061 LIPID PANEL: CPT | Performed by: NURSE PRACTITIONER

## 2023-03-13 PROCEDURE — 82043 UR ALBUMIN QUANTITATIVE: CPT | Performed by: NURSE PRACTITIONER

## 2023-03-13 PROCEDURE — 36415 COLL VENOUS BLD VENIPUNCTURE: CPT | Performed by: NURSE PRACTITIONER

## 2023-03-13 ASSESSMENT — PAIN SCALES - GENERAL: PAINLEVEL: NO PAIN (0)

## 2023-03-13 NOTE — PROGRESS NOTES
Assessment and Plan:     Type 2 diabetes mellitus without complication, without long-term current use of insulin (H)  Will check A1c and notify patient results.  If results are greater than 80%, will restart Trulicity.  She has a history of acute pancreatitis, so we will avoid Ozempic and Mounjaro.  - metFORMIN (GLUCOPHAGE) 500 MG tablet  Dispense: 360 tablet; Refill: 1  - HEMOGLOBIN A1C  - Albumin Random Urine Quantitative with Creat Ratio  - HEMOGLOBIN A1C    Hypothyroidism, unspecified type  We will check thyroid cascade and adjust levothyroxine accordingly.  - TSH WITH FREE T4 REFLEX  - TSH WITH FREE T4 REFLEX    Hypertriglyceridemia  Goal LDL is less than 100.  She continues simvastatin.  - Lipid panel reflex to direct LDL Non-fasting  - Lipid panel reflex to direct LDL Non-fasting    Encounter for screening mammogram for breast cancer  - *MA Screening Digital Bilateral    High priority for 2019-nCoV vaccine  - COVID-19,PF,MODERNA BIVALENT 18+Yrs  She will consider obtaining the shingles vaccine at the pharmacy.    Class 2 severe obesity due to excess calories with serious comorbidity and body mass index (BMI) of 36.0 to 36.9 in adult (H)  Recommend consuming a healthy diet and exercising.  This is contributing to hypertriglyceridemia and type 2 diabetes.        Subjective:     Korin is a 68 year old female presenting to the clinic for medication management.  Patient has type 2 diabetes and is taking metformin 1000 mg twice daily.  She is tolerating the medication well.  Her blood sugars have been elevated recently.  Blood sugars have been ranging around 200 postprandial.  She is not consuming healthy diet and walks for exercise.  She has a history of using Trulicity, but insurance would not cover it.  She would like to resume this if A1c is elevated.  Last A1c was 7.2% on 6/3/2022.  Patient has hypertension which is controlled lisinopril-hydrochlorothiazide 10-12.5 mg daily.  She is taking levothyroxine 175  mcg daily for hypothyroidism.  Last TSH was 0.30 in 12/27/2021.  She is taking simvastatin 10 mg daily for lipid management.  Last cholesterol check was on 12/27/2021 with a total cholesterol 115, triglycerides 131, HDL 42, LDL 47.  She is due for an eye exam.    Reviewof Systems: A complete 14 point review of systems was obtained and is negative or as stated in the history of present illness.    Social History     Socioeconomic History     Marital status: Single     Spouse name: Not on file     Number of children: Not on file     Years of education: Not on file     Highest education level: Not on file   Occupational History     Not on file   Tobacco Use     Smoking status: Former     Smokeless tobacco: Never     Tobacco comments:     quit in 1983   Substance and Sexual Activity     Alcohol use: Yes     Comment: Alcoholic Drinks/day: couple per week     Drug use: No     Sexual activity: Not on file   Other Topics Concern     Not on file   Social History Narrative     Not on file     Social Determinants of Health     Financial Resource Strain: Not on file   Food Insecurity: Not on file   Transportation Needs: Not on file   Physical Activity: Not on file   Stress: Not on file   Social Connections: Not on file   Intimate Partner Violence: Not on file   Housing Stability: Not on file       Active Ambulatory Problems     Diagnosis Date Noted     Obesity      Hypothyroidism      Cholelithiasis      Acute Pancreatitis      Menopause Has Occurred      Vitamin D Deficiency      Diabetes mellitus (H) 06/29/2015     Obesity (BMI 35.0-39.9) with comorbidity (H) 09/17/2018     Benign essential hypertension 11/15/2018     Hypertriglyceridemia 04/11/2019     Resolved Ambulatory Problems     Diagnosis Date Noted     No Resolved Ambulatory Problems     No Additional Past Medical History       Family History   Problem Relation Age of Onset     Heart Disease Mother      Diabetes Mother      Diabetes Father      Hypertension Father   "    Atrial fibrillation Father        Objective:     /60   Pulse 80   Temp 98.5  F (36.9  C)   Resp 25   Ht 1.664 m (5' 5.5\")   Wt 102.1 kg (225 lb)   PF 97 L/min   BMI 36.87 kg/m      Patient is alert, in no obvious distress.   Skin: Warm, dry.    Lungs:  Clear to auscultation. Respirations even and unlabored.  No wheezing or rales noted.   Heart:  Regular rate and rhythm.  No murmurs, S3, S4, gallops, or rubs.    Musculoskeletal: Monofilament testing is normal bilaterally.        Answers for HPI/ROS submitted by the patient on 3/13/2023  Frequency of checking blood sugars:: one time daily  What time of day are you checking your blood sugars : after meals  Have you had any blood sugars above 200?: Yes  Have you had any blood sugars below 70?: No  Hypoglycemia symptoms:: none  Diabetic concerns:: blood sugar frequently over 200  Paraesthesia present:: numbness in feet, burning in feet, weight gain  Have you had a diabetic eye exam within the last year?: Yes  Where was this eye exam done?: [facility]      "

## 2023-03-19 DIAGNOSIS — E11.9 TYPE 2 DIABETES MELLITUS WITHOUT COMPLICATION, WITHOUT LONG-TERM CURRENT USE OF INSULIN (H): ICD-10-CM

## 2023-03-19 RX ORDER — DULAGLUTIDE 0.75 MG/.5ML
0.75 INJECTION, SOLUTION SUBCUTANEOUS WEEKLY
Qty: 10 ML | Refills: 2 | Status: SHIPPED | OUTPATIENT
Start: 2023-03-19 | End: 2023-11-02 | Stop reason: ALTCHOICE

## 2023-05-04 NOTE — TELEPHONE ENCOUNTER
Prescription is signed and in my MA box.  Thanks.    Azithromycin Pregnancy And Lactation Text: This medication is considered safe during pregnancy and is also secreted in breast milk. Azelaic Acid Counseling: Patient counseled that medicine may cause skin irritation and to avoid applying near the eyes.  In the event of skin irritation, the patient was advised to reduce the amount of the drug applied or use it less frequently.   The patient verbalized understanding of the proper use and possible adverse effects of azelaic acid.  All of the patient's questions and concerns were addressed. Tazorac Pregnancy And Lactation Text: This medication is not safe during pregnancy. It is unknown if this medication is excreted in breast milk. Erythromycin Counseling:  I discussed with the patient the risks of erythromycin including but not limited to GI upset, allergic reaction, drug rash, diarrhea, increase in liver enzymes, and yeast infections. Minocycline Pregnancy And Lactation Text: This medication is Pregnancy Category D and not consider safe during pregnancy. It is also excreted in breast milk. Topical Clindamycin Pregnancy And Lactation Text: This medication is Pregnancy Category B and is considered safe during pregnancy. It is unknown if it is excreted in breast milk. Spironolactone Pregnancy And Lactation Text: This medication can cause feminization of the male fetus and should be avoided during pregnancy. The active metabolite is also found in breast milk. Isotretinoin Counseling: Patient should get monthly blood tests, not donate blood, not drive at night if vision affected, not share medication, and not undergo elective surgery for 6 months after tx completed. Side effects reviewed, pt to contact office should one occur. Benzoyl Peroxide Counseling: Patient counseled that medicine may cause skin irritation and bleach clothing.  In the event of skin irritation, the patient was advised to reduce the amount of the drug applied or use it less frequently.   The patient verbalized understanding of the proper use and possible adverse effects of benzoyl peroxide.  All of the patient's questions and concerns were addressed. Birth Control Pills Pregnancy And Lactation Text: This medication should be avoided if pregnant and for the first 30 days post-partum. Birth Control Pills Counseling: Birth Control Pill Counseling: I discussed with the patient the potential side effects of OCPs including but not limited to increased risk of stroke, heart attack, thrombophlebitis, deep venous thrombosis, hepatic adenomas, breast changes, GI upset, headaches, and depression.  The patient verbalized understanding of the proper use and possible adverse effects of OCPs. All of the patient's questions and concerns were addressed. Topical Retinoid counseling:  Patient advised to apply a pea-sized amount only at bedtime and wait 30 minutes after washing their face before applying.  If too drying, patient may add a non-comedogenic moisturizer. The patient verbalized understanding of the proper use and possible adverse effects of retinoids.  All of the patient's questions and concerns were addressed. Topical Sulfur Applications Pregnancy And Lactation Text: This medication is Pregnancy Category C and has an unknown safety profile during pregnancy. It is unknown if this topical medication is excreted in breast milk. Use Enhanced Medication Counseling?: No Winlevi Pregnancy And Lactation Text: This medication is considered safe during pregnancy and breastfeeding. Dapsone Pregnancy And Lactation Text: This medication is Pregnancy Category C and is not considered safe during pregnancy or breast feeding. Bactrim Counseling:  I discussed with the patient the risks of sulfa antibiotics including but not limited to GI upset, allergic reaction, drug rash, diarrhea, dizziness, photosensitivity, and yeast infections.  Rarely, more serious reactions can occur including but not limited to aplastic anemia, agranulocytosis, methemoglobinemia, blood dyscrasias, liver or kidney failure, lung infiltrates or desquamative/blistering drug rashes. High Dose Vitamin A Counseling: Side effects reviewed, pt to contact office should one occur. Tazorac Counseling:  Patient advised that medication is irritating and drying.  Patient may need to apply sparingly and wash off after an hour before eventually leaving it on overnight.  The patient verbalized understanding of the proper use and possible adverse effects of tazorac.  All of the patient's questions and concerns were addressed. Minocycline Counseling: Patient advised regarding possible photosensitivity and discoloration of the teeth, skin, lips, tongue and gums.  Patient instructed to avoid sunlight, if possible.  When exposed to sunlight, patients should wear protective clothing, sunglasses, and sunscreen.  The patient was instructed to call the office immediately if the following severe adverse effects occur:  hearing changes, easy bruising/bleeding, severe headache, or vision changes.  The patient verbalized understanding of the proper use and possible adverse effects of minocycline.  All of the patient's questions and concerns were addressed. Azithromycin Counseling:  I discussed with the patient the risks of azithromycin including but not limited to GI upset, allergic reaction, drug rash, diarrhea, and yeast infections. Topical Clindamycin Counseling: Patient counseled that this medication may cause skin irritation or allergic reactions.  In the event of skin irritation, the patient was advised to reduce the amount of the drug applied or use it less frequently.   The patient verbalized understanding of the proper use and possible adverse effects of clindamycin.  All of the patient's questions and concerns were addressed. Sarecycline Counseling: Patient advised regarding possible photosensitivity and discoloration of the teeth, skin, lips, tongue and gums.  Patient instructed to avoid sunlight, if possible.  When exposed to sunlight, patients should wear protective clothing, sunglasses, and sunscreen.  The patient was instructed to call the office immediately if the following severe adverse effects occur:  hearing changes, easy bruising/bleeding, severe headache, or vision changes.  The patient verbalized understanding of the proper use and possible adverse effects of sarecycline.  All of the patient's questions and concerns were addressed. Aklief Pregnancy And Lactation Text: It is unknown if this medication is safe to use during pregnancy.  It is unknown if this medication is excreted in breast milk.  Breastfeeding women should use the topical cream on the smallest area of the skin for the shortest time needed while breastfeeding.  Do not apply to nipple and areola. Doxycycline Pregnancy And Lactation Text: This medication is Pregnancy Category D and not consider safe during pregnancy. It is also excreted in breast milk but is considered safe for shorter treatment courses. Azelaic Acid Pregnancy And Lactation Text: This medication is considered safe during pregnancy and breast feeding. Erythromycin Pregnancy And Lactation Text: This medication is Pregnancy Category B and is considered safe during pregnancy. It is also excreted in breast milk. Topical Sulfur Applications Counseling: Topical Sulfur Counseling: Patient counseled that this medication may cause skin irritation or allergic reactions.  In the event of skin irritation, the patient was advised to reduce the amount of the drug applied or use it less frequently.   The patient verbalized understanding of the proper use and possible adverse effects of topical sulfur application.  All of the patient's questions and concerns were addressed. Spironolactone Counseling: Patient advised regarding risks of diarrhea, abdominal pain, hyperkalemia, birth defects (for female patients), liver toxicity and renal toxicity. The patient may need blood work to monitor liver and kidney function and potassium levels while on therapy. The patient verbalized understanding of the proper use and possible adverse effects of spironolactone.  All of the patient's questions and concerns were addressed. Winlevi Counseling:  I discussed with the patient the risks of topical clascoterone including but not limited to erythema, scaling, itching, and stinging. Patient voiced their understanding. Benzoyl Peroxide Pregnancy And Lactation Text: This medication is Pregnancy Category C. It is unknown if benzoyl peroxide is excreted in breast milk. Tetracycline Counseling: Patient counseled regarding possible photosensitivity and increased risk for sunburn.  Patient instructed to avoid sunlight, if possible.  When exposed to sunlight, patients should wear protective clothing, sunglasses, and sunscreen.  The patient was instructed to call the office immediately if the following severe adverse effects occur:  hearing changes, easy bruising/bleeding, severe headache, or vision changes.  The patient verbalized understanding of the proper use and possible adverse effects of tetracycline.  All of the patient's questions and concerns were addressed. Patient understands to avoid pregnancy while on therapy due to potential birth defects. Bactrim Pregnancy And Lactation Text: This medication is Pregnancy Category D and is known to cause fetal risk.  It is also excreted in breast milk. Dapsone Counseling: I discussed with the patient the risks of dapsone including but not limited to hemolytic anemia, agranulocytosis, rashes, methemoglobinemia, kidney failure, peripheral neuropathy, headaches, GI upset, and liver toxicity.  Patients who start dapsone require monitoring including baseline LFTs and weekly CBCs for the first month, then every month thereafter.  The patient verbalized understanding of the proper use and possible adverse effects of dapsone.  All of the patient's questions and concerns were addressed. Isotretinoin Pregnancy And Lactation Text: This medication is Pregnancy Category X and is considered extremely dangerous during pregnancy. It is unknown if it is excreted in breast milk. High Dose Vitamin A Pregnancy And Lactation Text: High dose vitamin A therapy is contraindicated during pregnancy and breast feeding. Topical Retinoid Pregnancy And Lactation Text: This medication is Pregnancy Category C. It is unknown if this medication is excreted in breast milk. Doxycycline Counseling:  Patient counseled regarding possible photosensitivity and increased risk for sunburn.  Patient instructed to avoid sunlight, if possible.  When exposed to sunlight, patients should wear protective clothing, sunglasses, and sunscreen.  The patient was instructed to call the office immediately if the following severe adverse effects occur:  hearing changes, easy bruising/bleeding, severe headache, or vision changes.  The patient verbalized understanding of the proper use and possible adverse effects of doxycycline.  All of the patient's questions and concerns were addressed. Aklief counseling:  Patient advised to apply a pea-sized amount only at bedtime and wait 30 minutes after washing their face before applying.  If too drying, patient may add a non-comedogenic moisturizer.  The most commonly reported side effects including irritation, redness, scaling, dryness, stinging, burning, itching, and increased risk of sunburn.  The patient verbalized understanding of the proper use and possible adverse effects of retinoids.  All of the patient's questions and concerns were addressed. Detail Level: Zone

## 2023-05-24 ENCOUNTER — PATIENT OUTREACH (OUTPATIENT)
Dept: CARE COORDINATION | Facility: CLINIC | Age: 68
End: 2023-05-24
Payer: COMMERCIAL

## 2023-05-30 ENCOUNTER — ANCILLARY PROCEDURE (OUTPATIENT)
Dept: MAMMOGRAPHY | Facility: CLINIC | Age: 68
End: 2023-05-30
Attending: NURSE PRACTITIONER
Payer: COMMERCIAL

## 2023-05-30 DIAGNOSIS — Z12.31 ENCOUNTER FOR SCREENING MAMMOGRAM FOR BREAST CANCER: ICD-10-CM

## 2023-05-30 PROCEDURE — 77067 SCR MAMMO BI INCL CAD: CPT

## 2023-06-01 DIAGNOSIS — R60.9 EDEMA, UNSPECIFIED TYPE: ICD-10-CM

## 2023-06-01 DIAGNOSIS — E78.2 MIXED HYPERLIPIDEMIA: ICD-10-CM

## 2023-06-01 DIAGNOSIS — E03.9 HYPOTHYROIDISM, UNSPECIFIED TYPE: ICD-10-CM

## 2023-06-01 DIAGNOSIS — I10 BENIGN ESSENTIAL HYPERTENSION: ICD-10-CM

## 2023-06-02 RX ORDER — SIMVASTATIN 10 MG
TABLET ORAL
Qty: 90 TABLET | Refills: 2 | Status: SHIPPED | OUTPATIENT
Start: 2023-06-02 | End: 2023-10-31

## 2023-06-02 RX ORDER — LISINOPRIL/HYDROCHLOROTHIAZIDE 10-12.5 MG
1 TABLET ORAL DAILY
Qty: 90 TABLET | Refills: 2 | Status: SHIPPED | OUTPATIENT
Start: 2023-06-02 | End: 2023-10-31

## 2023-06-02 RX ORDER — LEVOTHYROXINE SODIUM 175 UG/1
TABLET ORAL
Qty: 90 TABLET | Refills: 2 | Status: SHIPPED | OUTPATIENT
Start: 2023-06-02 | End: 2023-10-31

## 2023-06-02 NOTE — TELEPHONE ENCOUNTER
Prescription approved per [facility] Refill Protocol.    REN BradfordN, RN  [facility]

## 2023-06-03 DIAGNOSIS — E78.2 MIXED HYPERLIPIDEMIA: ICD-10-CM

## 2023-06-03 DIAGNOSIS — E03.9 HYPOTHYROIDISM, UNSPECIFIED TYPE: ICD-10-CM

## 2023-06-03 DIAGNOSIS — R60.9 EDEMA, UNSPECIFIED TYPE: ICD-10-CM

## 2023-06-03 DIAGNOSIS — I10 BENIGN ESSENTIAL HYPERTENSION: ICD-10-CM

## 2023-06-03 RX ORDER — LEVOTHYROXINE SODIUM 175 UG/1
TABLET ORAL
Qty: 90 TABLET | Refills: 3 | OUTPATIENT
Start: 2023-06-03

## 2023-06-03 RX ORDER — LISINOPRIL/HYDROCHLOROTHIAZIDE 10-12.5 MG
1 TABLET ORAL DAILY
Qty: 90 TABLET | Refills: 3 | OUTPATIENT
Start: 2023-06-03

## 2023-06-03 RX ORDER — SIMVASTATIN 10 MG
TABLET ORAL
Qty: 90 TABLET | Refills: 3 | OUTPATIENT
Start: 2023-06-03

## 2023-06-04 NOTE — TELEPHONE ENCOUNTER
"Last Written Prescription Date: 6/2/2023 x all 3  This appears to be a duplicate request. Message sent to pharmacy.     Requested Prescriptions   Pending Prescriptions Disp Refills     levothyroxine (SYNTHROID/LEVOTHROID) 175 MCG tablet [Pharmacy Med Name: Levothyroxine Sodium 175 MCG Oral Tablet] 90 tablet 3     Sig: TAKE 1 TABLET BY MOUTH  DAILY       Thyroid Protocol Passed - 6/3/2023  9:34 AM        Passed - Patient is 12 years or older        Passed - Recent (12 mo) or future (30 days) visit within the authorizing provider's specialty     Patient has had an office visit with the authorizing provider or a provider within the authorizing providers department within the previous 12 mos or has a future within next 30 days. See \"Patient Info\" tab in inbasket, or \"Choose Columns\" in Meds & Orders section of the refill encounter.              Passed - Medication is active on med list        Passed - Normal TSH on file in past 12 months     Recent Labs   Lab Test 03/13/23  1046   TSH 1.49              Passed - No active pregnancy on record     If patient is pregnant or has had a positive pregnancy test, please check TSH.          Passed - No positive pregnancy test in past 12 months     If patient is pregnant or has had a positive pregnancy test, please check TSH.             simvastatin (ZOCOR) 10 MG tablet [Pharmacy Med Name: Simvastatin 10 MG Oral Tablet] 90 tablet 3     Sig: TAKE 1 TABLET BY MOUTH AT  BEDTIME       Statins Protocol Passed - 6/3/2023  9:34 AM        Passed - LDL on file in past 12 months     Recent Labs   Lab Test 03/13/23  1046   LDL 46             Passed - No abnormal creatine kinase in past 12 months     No lab results found.             Passed - Recent (12 mo) or future (30 days) visit within the authorizing provider's specialty     Patient has had an office visit with the authorizing provider or a provider within the authorizing providers department within the previous 12 mos or has a future " "within next 30 days. See \"Patient Info\" tab in inbasket, or \"Choose Columns\" in Meds & Orders section of the refill encounter.              Passed - Medication is active on med list        Passed - Patient is age 18 or older        Passed - No active pregnancy on record        Passed - No positive pregnancy test in past 12 months           lisinopril-hydrochlorothiazide (ZESTORETIC) 10-12.5 MG tablet [Pharmacy Med Name: Lisinopril-hydroCHLOROthiazide 10-12.5 MG Oral Tablet] 90 tablet 3     Sig: TAKE 1 TABLET BY MOUTH  DAILY       Diuretics (Including Combos) Protocol Passed - 6/3/2023  9:34 AM        Passed - Blood pressure under 140/90 in past 12 months     BP Readings from Last 3 Encounters:   03/13/23 120/60   06/23/22 120/62   05/20/21 108/60                 Passed - Recent (12 mo) or future (30 days) visit within the authorizing provider's specialty     Patient has had an office visit with the authorizing provider or a provider within the authorizing providers department within the previous 12 mos or has a future within next 30 days. See \"Patient Info\" tab in inbasket, or \"Choose Columns\" in Meds & Orders section of the refill encounter.              Passed - Medication is active on med list        Passed - Patient is age 18 or older        Passed - No active pregancy on record        Passed - Normal serum creatinine on file in past 12 months     Recent Labs   Lab Test 06/23/22  0952   CR 0.75              Passed - Normal serum potassium on file in past 12 months     Recent Labs   Lab Test 06/23/22  0952   POTASSIUM 4.3                    Passed - Normal serum sodium on file in past 12 months     Recent Labs   Lab Test 06/23/22  0952                 Passed - No positive pregnancy test in past 12 months       ACE Inhibitors (Including Combos) Protocol Passed - 6/3/2023  9:34 AM        Passed - Blood pressure under 140/90 in past 12 months     BP Readings from Last 3 Encounters:   03/13/23 120/60   06/23/22 " "120/62   05/20/21 108/60                 Passed - Recent (12 mo) or future (30 days) visit within the authorizing provider's specialty     Patient has had an office visit with the authorizing provider or a provider within the authorizing providers department within the previous 12 mos or has a future within next 30 days. See \"Patient Info\" tab in inbasket, or \"Choose Columns\" in Meds & Orders section of the refill encounter.              Passed - Medication is active on med list        Passed - Patient is age 18 or older        Passed - No active pregnancy on record        Passed - Normal serum creatinine on file in past 12 months     Recent Labs   Lab Test 06/23/22  0952   CR 0.75       Ok to refill medication if creatinine is low          Passed - Normal serum potassium on file in past 12 months     Recent Labs   Lab Test 06/23/22  0952   POTASSIUM 4.3             Passed - No positive pregnancy test within past 12 months             Jessica Almendarez RN 06/03/23 10:26 PM  "

## 2023-06-08 ENCOUNTER — PATIENT OUTREACH (OUTPATIENT)
Dept: CARE COORDINATION | Facility: CLINIC | Age: 68
End: 2023-06-08
Payer: COMMERCIAL

## 2023-08-06 ENCOUNTER — HEALTH MAINTENANCE LETTER (OUTPATIENT)
Age: 68
End: 2023-08-06

## 2023-08-22 DIAGNOSIS — E11.9 TYPE 2 DIABETES MELLITUS WITHOUT COMPLICATION, WITHOUT LONG-TERM CURRENT USE OF INSULIN (H): ICD-10-CM

## 2023-08-23 NOTE — TELEPHONE ENCOUNTER
"Pt should have a refill on file.       Disp Refills Start End CHINYERE    metFORMIN (GLUCOPHAGE) 500 MG tablet 360 tablet 1 6/18/2023  No   Sig: TAKE 2 TABLETS BY MOUTH TWICE  DAILY WITH MEALS       Requested Prescriptions   Pending Prescriptions Disp Refills    metFORMIN (GLUCOPHAGE) 500 MG tablet [Pharmacy Med Name: metFORMIN HCl 500 MG Oral Tablet] 400 tablet 2     Sig: TAKE 2 TABLETS BY MOUTH TWICE  DAILY WITH MEALS       Biguanide Agents Failed - 8/22/2023 10:09 PM        Failed - Patient has documented A1c within the specified period of time.     If HgbA1C is 8 or greater, it needs to be on file within the past 3 months.  If less than 8, must be on file within the past 6 months.     Recent Labs   Lab Test 03/13/23  1046   A1C 8.7*             Failed - Patient's CR is NOT>1.4 OR Patient's EGFR is NOT<45 within past 12 mos.     Recent Labs   Lab Test 06/23/22  0952 12/27/21  0758 11/03/20  0744   GFRESTIMATED 86   < > >60   GFRESTBLACK  --   --  >60    < > = values in this interval not displayed.       Recent Labs   Lab Test 06/23/22  0952   CR 0.75             Passed - Patient is age 10 or older        Passed - Patient does NOT have a diagnosis of CHF.        Passed - Medication is active on med list        Passed - Patient is not pregnant        Passed - Patient has not had a positive pregnancy test within the past 12 mos.         Passed - Recent (6 mo) or future (30 days) visit within the authorizing provider's specialty     Patient had office visit in the last 6 months or has a visit in the next 30 days with authorizing provider or within the authorizing provider's specialty.  See \"Patient Info\" tab in inbasket, or \"Choose Columns\" in Meds & Orders section of the refill encounter.                 Tonia Calle RN 08/23/23 10:50 AM  "

## 2023-08-28 ENCOUNTER — MYC MEDICAL ADVICE (OUTPATIENT)
Dept: FAMILY MEDICINE | Facility: CLINIC | Age: 68
End: 2023-08-28
Payer: COMMERCIAL

## 2023-08-28 NOTE — TELEPHONE ENCOUNTER
Can you please call and clarify with the patient what is needed for this form.   I do not see that she has ever been diagnosed with CHRIS.  Has she ever seen a sleep specialist?  Thanks.

## 2023-09-01 ENCOUNTER — TELEPHONE (OUTPATIENT)
Dept: FAMILY MEDICINE | Facility: CLINIC | Age: 68
End: 2023-09-01
Payer: COMMERCIAL

## 2023-09-01 NOTE — TELEPHONE ENCOUNTER
Patient dropped off forms for [facility] to fill out. Patient states that the fax number is listed on the forms and she would like them faxed over and she will come and  the original forms. Please call patient at [phone] when complete.

## 2023-09-05 ENCOUNTER — MYC MEDICAL ADVICE (OUTPATIENT)
Dept: FAMILY MEDICINE | Facility: CLINIC | Age: 68
End: 2023-09-05
Payer: COMMERCIAL

## 2023-09-06 ENCOUNTER — TELEPHONE (OUTPATIENT)
Dept: FAMILY MEDICINE | Facility: CLINIC | Age: 68
End: 2023-09-06

## 2023-09-06 NOTE — TELEPHONE ENCOUNTER
Patient is looking to get the documentation that she had brought in to get her form completed. She would like for those documentation papers to be mailed to her home.

## 2023-09-07 NOTE — TELEPHONE ENCOUNTER
Form was faxed to number requested on the form Pt came to Clinic and Picked up original form on 9/6/23

## 2023-10-15 ENCOUNTER — HEALTH MAINTENANCE LETTER (OUTPATIENT)
Age: 68
End: 2023-10-15

## 2023-10-31 ENCOUNTER — OFFICE VISIT (OUTPATIENT)
Dept: FAMILY MEDICINE | Facility: CLINIC | Age: 68
End: 2023-10-31
Payer: COMMERCIAL

## 2023-10-31 VITALS
TEMPERATURE: 98.1 F | HEART RATE: 71 BPM | BODY MASS INDEX: 34.84 KG/M2 | WEIGHT: 216.8 LBS | DIASTOLIC BLOOD PRESSURE: 76 MMHG | HEIGHT: 66 IN | RESPIRATION RATE: 22 BRPM | OXYGEN SATURATION: 96 % | SYSTOLIC BLOOD PRESSURE: 133 MMHG

## 2023-10-31 DIAGNOSIS — Z23 NEED FOR INFLUENZA VACCINATION: ICD-10-CM

## 2023-10-31 DIAGNOSIS — E78.2 MIXED HYPERLIPIDEMIA: ICD-10-CM

## 2023-10-31 DIAGNOSIS — Z79.899 MEDICATION MANAGEMENT: ICD-10-CM

## 2023-10-31 DIAGNOSIS — Z00.00 ENCOUNTER FOR MEDICARE ANNUAL WELLNESS EXAM: Primary | ICD-10-CM

## 2023-10-31 DIAGNOSIS — E03.9 HYPOTHYROIDISM, UNSPECIFIED TYPE: ICD-10-CM

## 2023-10-31 DIAGNOSIS — E66.811 CLASS 1 OBESITY DUE TO EXCESS CALORIES WITH SERIOUS COMORBIDITY AND BODY MASS INDEX (BMI) OF 34.0 TO 34.9 IN ADULT: ICD-10-CM

## 2023-10-31 DIAGNOSIS — E11.9 TYPE 2 DIABETES MELLITUS WITHOUT COMPLICATION, WITHOUT LONG-TERM CURRENT USE OF INSULIN (H): ICD-10-CM

## 2023-10-31 DIAGNOSIS — H93.12 TINNITUS, LEFT: ICD-10-CM

## 2023-10-31 DIAGNOSIS — E66.09 CLASS 1 OBESITY DUE TO EXCESS CALORIES WITH SERIOUS COMORBIDITY AND BODY MASS INDEX (BMI) OF 34.0 TO 34.9 IN ADULT: ICD-10-CM

## 2023-10-31 DIAGNOSIS — I10 BENIGN ESSENTIAL HYPERTENSION: ICD-10-CM

## 2023-10-31 LAB
ALBUMIN SERPL BCG-MCNC: 4.2 G/DL (ref 3.5–5.2)
ALBUMIN UR-MCNC: NEGATIVE MG/DL
ALP SERPL-CCNC: 67 U/L (ref 35–104)
ALT SERPL W P-5'-P-CCNC: 37 U/L (ref 0–50)
ANION GAP SERPL CALCULATED.3IONS-SCNC: 9 MMOL/L (ref 7–15)
APPEARANCE UR: CLEAR
AST SERPL W P-5'-P-CCNC: 35 U/L (ref 0–45)
BACTERIA #/AREA URNS HPF: ABNORMAL /HPF
BILIRUB SERPL-MCNC: 0.4 MG/DL
BILIRUB UR QL STRIP: NEGATIVE
BUN SERPL-MCNC: 12.7 MG/DL (ref 8–23)
CALCIUM SERPL-MCNC: 9.8 MG/DL (ref 8.8–10.2)
CHLORIDE SERPL-SCNC: 100 MMOL/L (ref 98–107)
CHOLEST SERPL-MCNC: 148 MG/DL
COLOR UR AUTO: YELLOW
CREAT SERPL-MCNC: 0.74 MG/DL (ref 0.51–0.95)
DEPRECATED HCO3 PLAS-SCNC: 30 MMOL/L (ref 22–29)
EGFRCR SERPLBLD CKD-EPI 2021: 87 ML/MIN/1.73M2
ERYTHROCYTE [DISTWIDTH] IN BLOOD BY AUTOMATED COUNT: 12.9 % (ref 10–15)
GLUCOSE SERPL-MCNC: 196 MG/DL (ref 70–99)
GLUCOSE UR STRIP-MCNC: NEGATIVE MG/DL
HBA1C MFR BLD: 8.3 % (ref 0–5.6)
HCT VFR BLD AUTO: 40.6 % (ref 35–47)
HDLC SERPL-MCNC: 42 MG/DL
HGB BLD-MCNC: 13.2 G/DL (ref 11.7–15.7)
HGB UR QL STRIP: NEGATIVE
KETONES UR STRIP-MCNC: NEGATIVE MG/DL
LDLC SERPL CALC-MCNC: 76 MG/DL
LEUKOCYTE ESTERASE UR QL STRIP: ABNORMAL
MCH RBC QN AUTO: 29.9 PG (ref 26.5–33)
MCHC RBC AUTO-ENTMCNC: 32.5 G/DL (ref 31.5–36.5)
MCV RBC AUTO: 92 FL (ref 78–100)
NITRATE UR QL: NEGATIVE
NONHDLC SERPL-MCNC: 106 MG/DL
PH UR STRIP: 6 [PH] (ref 5–8)
PLATELET # BLD AUTO: 304 10E3/UL (ref 150–450)
POTASSIUM SERPL-SCNC: 4.6 MMOL/L (ref 3.4–5.3)
PROT SERPL-MCNC: 7.2 G/DL (ref 6.4–8.3)
RBC # BLD AUTO: 4.41 10E6/UL (ref 3.8–5.2)
RBC #/AREA URNS AUTO: ABNORMAL /HPF
SODIUM SERPL-SCNC: 139 MMOL/L (ref 135–145)
SP GR UR STRIP: 1.02 (ref 1–1.03)
SQUAMOUS #/AREA URNS AUTO: ABNORMAL /LPF
TRIGL SERPL-MCNC: 150 MG/DL
TSH SERPL DL<=0.005 MIU/L-ACNC: 0.43 UIU/ML (ref 0.3–4.2)
UROBILINOGEN UR STRIP-ACNC: 0.2 E.U./DL
WBC # BLD AUTO: 8 10E3/UL (ref 4–11)
WBC #/AREA URNS AUTO: ABNORMAL /HPF

## 2023-10-31 PROCEDURE — 85027 COMPLETE CBC AUTOMATED: CPT | Performed by: NURSE PRACTITIONER

## 2023-10-31 PROCEDURE — 99214 OFFICE O/P EST MOD 30 MIN: CPT | Mod: 25 | Performed by: NURSE PRACTITIONER

## 2023-10-31 PROCEDURE — 90662 IIV NO PRSV INCREASED AG IM: CPT | Performed by: NURSE PRACTITIONER

## 2023-10-31 PROCEDURE — G0439 PPPS, SUBSEQ VISIT: HCPCS | Performed by: NURSE PRACTITIONER

## 2023-10-31 PROCEDURE — G0008 ADMIN INFLUENZA VIRUS VAC: HCPCS | Performed by: NURSE PRACTITIONER

## 2023-10-31 PROCEDURE — 83036 HEMOGLOBIN GLYCOSYLATED A1C: CPT | Performed by: NURSE PRACTITIONER

## 2023-10-31 PROCEDURE — 81001 URINALYSIS AUTO W/SCOPE: CPT | Performed by: NURSE PRACTITIONER

## 2023-10-31 PROCEDURE — 80061 LIPID PANEL: CPT | Performed by: NURSE PRACTITIONER

## 2023-10-31 PROCEDURE — 80053 COMPREHEN METABOLIC PANEL: CPT | Performed by: NURSE PRACTITIONER

## 2023-10-31 PROCEDURE — 36415 COLL VENOUS BLD VENIPUNCTURE: CPT | Performed by: NURSE PRACTITIONER

## 2023-10-31 PROCEDURE — 84443 ASSAY THYROID STIM HORMONE: CPT | Performed by: NURSE PRACTITIONER

## 2023-10-31 RX ORDER — RESPIRATORY SYNCYTIAL VIRUS VACCINE 120MCG/0.5
0.5 KIT INTRAMUSCULAR ONCE
Qty: 1 EACH | Refills: 0 | Status: CANCELLED | OUTPATIENT
Start: 2023-10-31 | End: 2023-10-31

## 2023-10-31 RX ORDER — SIMVASTATIN 10 MG
10 TABLET ORAL AT BEDTIME
Qty: 90 TABLET | Refills: 2 | Status: SHIPPED | OUTPATIENT
Start: 2023-10-31 | End: 2023-11-27 | Stop reason: ALTCHOICE

## 2023-10-31 RX ORDER — LISINOPRIL/HYDROCHLOROTHIAZIDE 10-12.5 MG
1 TABLET ORAL DAILY
Qty: 90 TABLET | Refills: 2 | Status: SHIPPED | OUTPATIENT
Start: 2023-10-31

## 2023-10-31 RX ORDER — LEVOTHYROXINE SODIUM 175 UG/1
175 TABLET ORAL DAILY
Qty: 90 TABLET | Refills: 2 | Status: SHIPPED | OUTPATIENT
Start: 2023-10-31

## 2023-10-31 ASSESSMENT — ENCOUNTER SYMPTOMS
JOINT SWELLING: 1
PARESTHESIAS: 0
SORE THROAT: 0
DYSURIA: 0
HEMATOCHEZIA: 0
NERVOUS/ANXIOUS: 0
FREQUENCY: 1
SHORTNESS OF BREATH: 0
BREAST MASS: 0
EYE PAIN: 1
HEMATURIA: 0
CONSTIPATION: 0
PALPITATIONS: 0
FEVER: 0
NAUSEA: 0
COUGH: 0
CHILLS: 0
MYALGIAS: 0
DIZZINESS: 0
ARTHRALGIAS: 1
ABDOMINAL PAIN: 0
WEAKNESS: 0
HEADACHES: 0
DIARRHEA: 0
HEARTBURN: 0

## 2023-10-31 ASSESSMENT — PAIN SCALES - GENERAL: PAINLEVEL: NO PAIN (0)

## 2023-10-31 ASSESSMENT — ACTIVITIES OF DAILY LIVING (ADL): CURRENT_FUNCTION: NO ASSISTANCE NEEDED

## 2023-10-31 NOTE — PROGRESS NOTES
"SUBJECTIVE:   Yady is a 68 year old who presents for Preventive Visit.    Patient has been a relationship with a female for 30 years.  She has 2 sons ages 18 and 21 who are adopted.  She has no concerns for STDs.  Her last menstrual period was at the age of 49.  She has not had any vaginal bleeding since.  She does have type 2 diabetes and is taking metformin 1000 mg twice daily.  Last A1c was 8.7% on 3/13/2023.  She checks random blood sugars and they have been ranging 112-189.  She discontinued Trulicity because she cannot afford it.  I did place a referral to our pharmacist, but unfortunately they could not connect.  She is consuming a healthy diet.  She walks for exercise.  She states she has lost weight.  She has hypertension which is controlled lisinopril-hydrochlorothiazide 10-12.5 mg daily.  She has hypothyroidism which is controlled with levothyroxine 175 mcg daily.  Last TSH was 1.49 on 3/13/2023.  She is taking simvastatin 10 mg daily for lipid management.  Last cholesterol check was on 12/27/2021 with a total cholesterol 114, triglycerides 156, HDL 37, LDL 46.  Patient is concerned of tinnitus within her left ear. She has had this for multiple years and has seen numerous ENT specialists.  Patient states symptoms have worsened.  She is now experiencing \"prolonged rumblings\" within her ear.  This is quite bothersome to her.  She has a history of stapedectomy on the left ear 30  years ago (done at [facility].            10/31/2023    10:20 AM   Additional Questions   Roomed by Rhonda       Are you in the first 12 months of your Medicare coverage?  No    Healthy Habits:     In general, how would you rate your overall health?  Good    Frequency of exercise:  1 day/week    Duration of exercise:  15-30 minutes    Do you usually eat at least 4 servings of fruit and vegetables a day, include whole grains    & fiber and avoid regularly eating high fat or \"junk\" foods?  No    Taking medications regularly:  Yes    " Medication side effects:  None    Ability to successfully perform activities of daily living:  No assistance needed    Home Safety:  No safety concerns identified    Hearing Impairment:  Difficulty following a conversation in a noisy restaurant or crowded room, feel that people are mumbling or not speaking clearly and need to ask people to speak up or repeat themselves    In the past 6 months, have you been bothered by leaking of urine?  No    In general, how would you rate your overall mental or emotional health?  Good    Additional concerns today:  Yes          Have you ever done Advance Care Planning? (For example, a Health Directive, POLST, or a discussion with a medical provider or your loved ones about your wishes): No, advance care planning information given to patient to review.  Patient plans to discuss their wishes with loved ones or provider.         Fall risk  Fallen 2 or more times in the past year?: No  Any fall with injury in the past year?: No    Cognitive Screening   1) Repeat 3 items (Leader, Season, Table)    2) Clock draw: NORMAL  3) 3 item recall: Recalls 3 objects  Results: 3 items recalled: COGNITIVE IMPAIRMENT LESS LIKELY    Mini-CogTM Copyright ABE [employer]par. Licensed by the author for use in [facility]; reprinted with permission (brandon@[facility]). All rights reserved.      Do you have sleep apnea, excessive snoring or daytime drowsiness? : no    Reviewed and updated as needed this visit by clinical staff   Tobacco  Allergies  Meds              Reviewed and updated as needed this visit by Provider                 Social History     Tobacco Use     Smoking status: Former     Smokeless tobacco: Never     Tobacco comments:     quit in 1983   Substance Use Topics     Alcohol use: Yes     Comment: Alcoholic Drinks/day: couple per week             10/31/2023    10:18 AM   Alcohol Use   Prescreen: >3 drinks/day or >7 drinks/week? No          No data to display              Do you have a  current opioid prescription? No  Do you use any other controlled substances or medications that are not prescribed by a provider? None      Current providers sharing in care for this patient include:   Patient Care Team:  Racquel Dunn APRN CNP as PCP - General (Family Practice)  Carine Gamez PharmD as Pharmacist (Pharmacist)  Racquel Dunn APRN CNP as Assigned PCP    The following health maintenance items are reviewed in Epic and correct as of today:  Health Maintenance   Topic Date Due     ZOSTER IMMUNIZATION (1 of 2) Never done     LUNG CANCER SCREENING  Never done     HEPATITIS B IMMUNIZATION (3 of 3 - Risk 3-dose series) 09/07/2005     RSV VACCINE 60+ (1 - 1-dose 60+ series) Never done     EYE EXAM  02/25/2023     MEDICARE ANNUAL WELLNESS VISIT  06/23/2023     BMP  06/23/2023     ANNUAL REVIEW OF HM ORDERS  06/23/2023     INFLUENZA VACCINE (1) 09/01/2023     COVID-19 Vaccine (6 - 2023-24 season) 09/01/2023     A1C  09/13/2023     HEPATITIS C SCREENING  03/13/2024 (Originally 3/13/1973)     LIPID  03/13/2024     MICROALBUMIN  03/13/2024     TSH W/FREE T4 REFLEX  03/13/2024     DIABETIC FOOT EXAM  03/13/2024     FALL RISK ASSESSMENT  10/31/2024     MAMMO SCREENING  05/30/2025     COLORECTAL CANCER SCREENING  08/29/2025     ADVANCE CARE PLANNING  06/23/2027     DTAP/TDAP/TD IMMUNIZATION (3 - Td or Tdap) 09/17/2028     DEXA  06/13/2029     PHQ-2 (once per calendar year)  Completed     Pneumococcal Vaccine: 65+ Years  Completed     IPV IMMUNIZATION  Aged Out     HPV IMMUNIZATION  Aged Out     MENINGITIS IMMUNIZATION  Aged Out             6/23/2022     8:52 AM   Breast CA Risk Assessment (FHS-7)   Do you have a family history of breast, colon, or ovarian cancer? No / Unknown         Mammogram Screening: Recommended mammography every 1-2 years with patient discussion and risk factor consideration  Pertinent mammograms are reviewed under the imaging tab.    Review of Systems   Constitutional:  Negative for  "chills and fever.   HENT:  Positive for ear pain and hearing loss. Negative for congestion and sore throat.    Eyes:  Positive for pain. Negative for visual disturbance.   Respiratory:  Negative for cough and shortness of breath.    Cardiovascular:  Negative for chest pain, palpitations and peripheral edema.   Gastrointestinal:  Negative for abdominal pain, constipation, diarrhea, heartburn, hematochezia and nausea.   Breasts:  Negative for tenderness, breast mass and discharge.   Genitourinary:  Positive for frequency and urgency. Negative for dysuria, genital sores, hematuria, pelvic pain and vaginal discharge.   Musculoskeletal:  Positive for arthralgias and joint swelling. Negative for myalgias.   Skin:  Negative for rash.   Neurological:  Negative for dizziness, weakness, headaches and paresthesias.   Psychiatric/Behavioral:  Negative for mood changes. The patient is not nervous/anxious.      Constitutional, HEENT, cardiovascular, pulmonary, gi and gu systems are negative, except as otherwise noted.    OBJECTIVE:   /76 (BP Location: Left arm, Patient Position: Sitting, Cuff Size: Adult Regular)   Pulse 71   Temp 98.1  F (36.7  C) (Temporal)   Resp 22   Ht 1.677 m (5' 6.02\")   Wt 98.3 kg (216 lb 12.8 oz)   LMP  (LMP Unknown)   SpO2 96%   BMI 34.97 kg/m   Estimated body mass index is 34.97 kg/m  as calculated from the following:    Height as of this encounter: 1.677 m (5' 6.02\").    Weight as of this encounter: 98.3 kg (216 lb 12.8 oz).  Physical Exam  GENERAL: healthy, alert and no distress  EYES: Eyes grossly normal to inspection, PERRL and conjunctivae and sclerae normal  HENT: ear canals and TM's normal, nose and mouth without ulcers or lesions  NECK: no adenopathy, no asymmetry, masses, or scars and thyroid normal to palpation  RESP: lungs clear to auscultation - no rales, rhonchi or wheezes  BREAST: normal without masses, tenderness or nipple discharge and no palpable axillary masses or " adenopathy  CV: regular rate and rhythm, normal S1 S2, no S3 or S4, no murmur, click or rub, no peripheral edema and peripheral pulses strong  ABDOMEN: soft, nontender, no hepatosplenomegaly, no masses and bowel sounds normal  MS: no gross musculoskeletal defects noted, no edema  SKIN: no suspicious lesions or rashes  NEURO: Normal strength and tone, mentation intact and speech normal  PSYCH: mentation appears normal, affect normal/bright    Diagnostic Test Results:  Labs reviewed in Epic    ASSESSMENT / PLAN:   Encounter for Medicare annual wellness exam  Recommend consuming a healthy diet and exercising.  She will consider obtaining the RSV and shingles vaccine at pharmacy.  Influenza vaccine provided.  She denies COVID-vaccine.  She is up-to-date on colorectal cancer screening, breast cancer screening.  - CBC with platelets  - UA Macroscopic with reflex to Microscopic and Culture  - UA Macroscopic with reflex to Microscopic and Culture    Need for influenza vaccination  - INFLUENZA VACCINE 65+ (FLUZONE HD)    Benign essential hypertension  This is controlled.  She continues lisinopril-hydrochlorothiazide.  - lisinopril-hydrochlorothiazide (ZESTORETIC) 10-12.5 MG tablet  Dispense: 90 tablet; Refill: 2    Hypothyroidism, unspecified type  We will check thyroid cascade and adjust levothyroxine accordingly.  - TSH with free T4 reflex  - levothyroxine (SYNTHROID/LEVOTHROID) 175 MCG tablet  Dispense: 90 tablet; Refill: 2    Mixed hyperlipidemia  Goal LDL is less than 100.  She continues simvastatin.  - Lipid panel reflex to direct LDL Fasting  - simvastatin (ZOCOR) 10 MG tablet  Dispense: 90 tablet; Refill: 2    Type 2 diabetes mellitus without complication, without long-term current use of insulin (H)  Last A1c was uncontrolled.  We will check A1c today.  She continues metformin.  Will refer to pharmacist to discuss discounts for Trulicity.  Patient has a history of pancreatitis due to gallbladder disease.  Otherwise,  "Ozempic or Mounjaro may be a good option.  - Hemoglobin A1c  - Med Therapy Management Referral  - metFORMIN (GLUCOPHAGE) 500 MG tablet  Dispense: 360 tablet; Refill: 1    Tinnitus, left  We will refer to ENT.  - Adult ENT  Referral    Class 1 obesity due to excess calories with serious comorbidity and body mass index (BMI) of 34.0 to 34.9 in adult  Recommend consuming a healthy diet and exercising.  This is contributing to hypertension, hyperlipidemia, diabetes.    Medication management  - Comprehensive metabolic panel            COUNSELING:  Reviewed preventive health counseling, as reflected in patient instructions       Regular exercise       Healthy diet/nutrition       Vision screening       Hearing screening       Dental care       Osteoporosis prevention/bone health      BMI:   Estimated body mass index is 34.97 kg/m  as calculated from the following:    Height as of this encounter: 1.677 m (5' 6.02\").    Weight as of this encounter: 98.3 kg (216 lb 12.8 oz).   Weight management plan: Discussed healthy diet and exercise guidelines      She reports that she has quit smoking. She has never used smokeless tobacco.      Appropriate preventive services were discussed with this patient, including applicable screening as appropriate for fall prevention, nutrition, physical activity, Tobacco-use cessation, weight loss and cognition.  Checklist reviewing preventive services available has been given to the patient.    Reviewed patients plan of care and provided an AVS. The Basic Care Plan (routine screening as documented in Health Maintenance) for Korin meets the Care Plan requirement. This Care Plan has been established and reviewed with the Patient.          ASHLEY Chavez CNP  [facility]    Identified Health Risks:  I have reviewed Opioid Use Disorder and Substance Use Disorder risk factors and made any needed referrals.   "

## 2023-11-01 NOTE — PROGRESS NOTES
Medication Therapy Management (MTM) Encounter    ASSESSMENT:                            Medication Adherence/Access: See below for considerations    Diabetes: Patient's most recent A1c is above goal of <7%, and would be indicated for additional therapy. Mary recommend starting Ozempic, patient will be able to use insurance for now but starting in 2024 will work with [place] team to enroll in [facility] program to continue on Ozempic therapy. Educated on dosing, administration, storage, and side effects today. Patient does have a history of pancreatitis with a known cause that has been removed thus risk of developing pancreatitis is low and patient was able to tolerate Trulicity in the past which holds the same risks in regards to pancreatitis as Ozempic. Educated on signs and symptoms of pancreatitis and recommend to seek medical attention if they were to develop. Patient plans to schedule eye exam for this year.     Hypertension: Stable, most recent clinic blood pressure reading is at/near goal of <130/80.    Hyperlipidemia: Discussed option to today to change simvastatin to an alternative statin that may have less risk of causing muscle pains. Patient declined recommendation today but would potentially consider in the future. LDL at/near goal of <70 and patient is appropriately taking simvastatin in the evening for greatest efficacy.     Hypothyroidism: Stable. Last TSH is within normal limits. Recommend that patient consistently take levothyroxine how they have been.     Supplements: Stable, may benefit from obtaining updated vitamin D and magnesium levels at next lab draw.     PLAN:                            Stay consistent with how you take levothyroxine.     Start Ozempic 0.25mg once weekly for 4 weeks, if you are tolerating it we can increase it to 0.5mg once weekly.     Here is a number for the [facility] Program team [phone]. Give them a call to see if you qualify for financial  assistance with Ozempic.     Start checking your blood sugars once daily, you can either check in the morning before eating or 2 hours after a meal. Goal before eatin-130 mg/dL. Goal 2 hours after a meal: <180 mg/dL    Future consideration: switching to a different cholesterol medication.     Follow-up: 2023 at 9AM    SUBJECTIVE/OBJECTIVE:                          Yady Sharma is a 68 year old female contacted via secure video for an initial visit. She was referred to me from Racquel RAMIREZ CNP.      Reason for visit: Diabetes management.    Allergies/ADRs: Reviewed in chart  Past Medical History: Reviewed in chart  Tobacco: She reports that she has quit smoking. Her smoking use included cigarettes. She has a 13.00 pack-year smoking history. She has never used smokeless tobacco.  Alcohol: an average of 1 beverage / month  Other Substance Use: none    Medication Adherence/Access: Patient uses pill box(es).  Patient takes medications 2 time(s) per day.   Per patient, misses medication 1 times per month.   Medication barriers: Unable to afford Trulicity.     Diabetes   Metformin 1000mg twice daily   Was on Trulicity but had to discontinue because she can't afford it. She notes that she has not been able to have this for about a year. Notes that it was about $135 before entering the coverage gap then increased to $500. Notes that she was able to tolerate Trulicity.   Patient does have a history of pancreatitis caused from gallbladder disease but the gallbladder has now been removed.   Experiences belching with metformin but this is overall tolerable.   Blood sugar monitoring: occasionally; Ranges: (patient reported) at random times throughout the day: usually around the 200s are where sugars are now, can range from 150-250.     Eye exam in the last 12 months? No- will be scheduling an eye exam next after our visit today.   Foot exam is up to date  Urine Albumin:   Lab Results   Component Value Date    UMALCR   03/13/2023      Comment:      Unable to calculate, urine albumin and/or urine creatinine is outside detectable limits.  Microalbuminuria is defined as an albumin:creatinine ratio of 17 to 299 for males and 25 to 299 for females. A ratio of albumin:creatinine of 300 or higher is indicative of overt proteinuria.  Due to biologic variability, positive results should be confirmed by a second, first-morning random or 24-hour timed urine specimen. If there is discrepancy, a third specimen is recommended. When 2 out of 3 results are in the microalbuminuria range, this is evidence for incipient nephropathy and warrants increased efforts at glucose control, blood pressure control, and institution of therapy with an angiotensin-converting-enzyme (ACE) inhibitor (if the patient can tolerate it).        Lab Results   Component Value Date    A1C 8.3 (H) 10/31/2023     Hypertension   Lisinopril-hydrochlorothiazide 10-12.5mg daily   Patient reports no current medication side effects  Patient does not self-monitor blood pressure.       BP Readings from Last 3 Encounters:   10/31/23 133/76   03/13/23 120/60   06/23/22 120/62     Pulse Readings from Last 3 Encounters:   10/31/23 71   03/13/23 80   06/23/22 72     Hyperlipidemia   simvastatin 10mg daily- patient notes that she takes this in the evening.   Patient reports no significant myalgias or other side effects. Notes that she has stiffness all the time that is minor, she is not sure if the symptoms occurred before or after starting simvastatin since she has been on the medication for years.      Recent Labs   Lab Test 10/31/23  1119 03/13/23  1046   CHOL 148 114   HDL 42* 37*   LDL 76 46   TRIG 150* 156*     Hypothyroidism   Levothyroxine 175 mcg daily. Patient notes that she daily in the morning with food and other medications.   Patient is having the following symptoms: none.      TSH   Date Value Ref Range Status   10/31/2023 0.43 0.30 - 4.20 uIU/mL Final   12/27/2021 0.30  "0.30 - 5.00 uIU/mL Final     Supplements:   Vitamin D 5000 international unit(s) daily   Magnesium 250 mg daily at night for general health and leg cramps, patient notes that they started taking about 2 months ago and this has helped with leg cramping.   Multivitamin 1 tablet daily   Vitamin D Deficiency Screening Results:  No results found for: \"VITDT\"    Magnesium   Date Value Ref Range Status   05/20/2021 1.9 1.8 - 2.6 mg/dL Final     Today's Vitals: LMP  (LMP Unknown)   ----------------  I spent 47 minutes with this patient today. All changes were made via collaborative practice agreement with ASHLEY Chavez CNP. A copy of the visit note was provided to the patient's provider(s).    A summary of these recommendations was sent via [employer].    Antonieta Ardon, PharmD  Medication Therapy Management Pharmacist   [facility]     Telemedicine Visit Details  Type of service:  Video Conference via [facility]  Start Time:  9:05 AM  End Time:  9:52 AM       Medication Therapy Recommendations  Diabetes mellitus (H)    Current Medication: metFORMIN (GLUCOPHAGE) 500 MG tablet   Rationale: Synergistic therapy - Needs additional medication therapy - Indication   Recommendation: Start Medication - Ozempic (0.25 or 0.5 MG/DOSE) 2 MG/1.5ML Sopn   Status: Accepted per CPA         Hypertriglyceridemia    Current Medication: simvastatin (ZOCOR) 10 MG tablet   Rationale: Undesirable effect - Adverse medication event - Safety   Recommendation: Change Medication   Status: Declined per Patient            "

## 2023-11-02 ENCOUNTER — VIRTUAL VISIT (OUTPATIENT)
Dept: PHARMACY | Facility: CLINIC | Age: 68
End: 2023-11-02
Attending: NURSE PRACTITIONER
Payer: COMMERCIAL

## 2023-11-02 ENCOUNTER — TELEPHONE (OUTPATIENT)
Dept: PHARMACY | Facility: CLINIC | Age: 68
End: 2023-11-02
Payer: COMMERCIAL

## 2023-11-02 DIAGNOSIS — I10 BENIGN ESSENTIAL HYPERTENSION: ICD-10-CM

## 2023-11-02 DIAGNOSIS — E78.1 HYPERTRIGLYCERIDEMIA: ICD-10-CM

## 2023-11-02 DIAGNOSIS — E11.9 TYPE 2 DIABETES MELLITUS WITHOUT COMPLICATION, WITHOUT LONG-TERM CURRENT USE OF INSULIN (H): Primary | ICD-10-CM

## 2023-11-02 DIAGNOSIS — Z78.9 TAKES DIETARY SUPPLEMENTS: ICD-10-CM

## 2023-11-02 DIAGNOSIS — E03.9 ACQUIRED HYPOTHYROIDISM: ICD-10-CM

## 2023-11-02 PROCEDURE — 99207 PR NO CHARGE LOS: CPT | Mod: VID

## 2023-11-02 RX ORDER — MULTIVITAMIN WITH IRON
1 TABLET ORAL AT BEDTIME
COMMUNITY

## 2023-11-02 RX ORDER — MULTIVITAMIN,THERAPEUTIC
1 TABLET ORAL DAILY
COMMUNITY

## 2023-11-02 RX ORDER — BLOOD SUGAR DIAGNOSTIC
STRIP MISCELLANEOUS
Qty: 100 STRIP | Refills: 3 | Status: SHIPPED | OUTPATIENT
Start: 2023-11-02

## 2023-11-02 NOTE — TELEPHONE ENCOUNTER
Is this a new referral or dose change: new patient    Patient preferred phone number (please verify with pt): [phone]    Additional helpful info for PAP team:  Patient will need assistance for enrolling in [facility] program for 2024. They would be a new patient, will pay for Ozempic now until able to get onto program.     Notes for MTM team:   Route TE to p [id number]    New patient referral  PAP team member will contact the patient via phone within 1-2 business days to schedule a medication consult.   PAP team will notify MTM via telephone encounter that a consult is scheduled.   PAP team will document next steps in an Epic telephone encounter, after the consult is completed.   Dose change/refill for current PAP patient  Enter new prescription in Epic (no print-out)  PAP team will document next steps in an Epic telephone encounter

## 2023-11-06 PROBLEM — R73.03 PREDIABETES: Status: ACTIVE | Noted: 2023-11-06

## 2023-11-13 ENCOUNTER — TRANSFERRED RECORDS (OUTPATIENT)
Dept: HEALTH INFORMATION MANAGEMENT | Facility: CLINIC | Age: 68
End: 2023-11-13
Payer: COMMERCIAL

## 2023-11-13 LAB — RETINOPATHY: NEGATIVE

## 2023-11-24 NOTE — PROGRESS NOTES
Medication Therapy Management (MTM) Encounter    ASSESSMENT:                            Medication Adherence/Access: Encouraged to work with [facility] team for [employer]  application for financial assistance with Ozempic.     Diabetes: Tolerating Ozempic with no concerns and patient reported home blood sugars are improving closer to fasting goal of  and are at goal of <180 for 2 hour post prandial thus would recommend continuing Ozempic titration.     Hyperlipidemia: Would benefit from changing to an alternative statin that may have less risk of causing muscle pains such as rosuvastatin or trying Coq10 supplement. Patient prefers to try alternative statin at this time to see if this can help to reduce current symptoms of muscle pains/stiffness.     PLAN:                            Stop simvastatin     Start rosuvastatin 10mg daily     Follow-up: 2023 at 9AM    SUBJECTIVE/OBJECTIVE:                          Yady Sharma is a 68 year old female contacted via secure video for a follow-up visit from 2023.       Reason for visit: Diabetes follow up.    Allergies/ADRs: Reviewed in chart  Past Medical History: Reviewed in chart    Medication Adherence/Access: Got a letter from [employer] to enroll in [employer]  program for Ozempic, she notes that she will be working on this.     Diabetes   Metformin 1000mg twice daily   Ozempic 0.25mg weekly - patient notes that they have completed 3 doses at this dose. Denies side effects at this time.   Blood sugar monitoring: occasionally; Ranges: (patient reported) at random times throughout the day:   Mornin-195 (improvement from 200's)   2 hours after meals:      Eye exam is up to date  Foot exam is up to date  Urine Albumin:   Lab Results   Component Value Date    UMALCR  2023      Comment:      Unable to calculate, urine albumin and/or urine creatinine is outside detectable limits.  Microalbuminuria is defined as an albumin:creatinine ratio of 17 to 299  for males and 25 to 299 for females. A ratio of albumin:creatinine of 300 or higher is indicative of overt proteinuria.  Due to biologic variability, positive results should be confirmed by a second, first-morning random or 24-hour timed urine specimen. If there is discrepancy, a third specimen is recommended. When 2 out of 3 results are in the microalbuminuria range, this is evidence for incipient nephropathy and warrants increased efforts at glucose control, blood pressure control, and institution of therapy with an angiotensin-converting-enzyme (ACE) inhibitor (if the patient can tolerate it).        Lab Results   Component Value Date    A1C 8.3 (H) 10/31/2023     Hyperlipidemia   simvastatin 10mg daily- patient notes that she takes this in the evening.   Notes that she has stiffness all the time that is minor, she is not sure if the symptoms occurred before or after starting simvastatin since she has been on the medication for years.      Recent Labs   Lab Test 10/31/23  1119 03/13/23  1046   CHOL 148 114   HDL 42* 37*   LDL 76 46   TRIG 150* 156*     Today's Vitals: LMP  (LMP Unknown)   ----------------  I spent 12 minutes with this patient today. All changes were made via collaborative practice agreement with ASHLEY Chavez CNP. A copy of the visit note was provided to the patient's provider(s).    A summary of these recommendations was sent via [employer].    Antonieta Ardon, PharmD  Medication Therapy Management Pharmacist   [facility] and [facility]     Telemedicine Visit Details  Type of service:  Video Conference via [employer]  Start Time:  8:58 AM  End Time: 9:10 AM     Medication Therapy Recommendations  Hypertriglyceridemia    Current Medication: simvastatin (ZOCOR) 10 MG tablet (Discontinued)   Rationale: Undesirable effect - Adverse medication event - Safety   Recommendation: Change Medication - rosuvastatin 10 MG tablet   Status: Accepted per CPA

## 2023-11-27 ENCOUNTER — VIRTUAL VISIT (OUTPATIENT)
Dept: PHARMACY | Facility: CLINIC | Age: 68
End: 2023-11-27
Payer: COMMERCIAL

## 2023-11-27 DIAGNOSIS — E78.1 HYPERTRIGLYCERIDEMIA: ICD-10-CM

## 2023-11-27 DIAGNOSIS — E11.9 TYPE 2 DIABETES MELLITUS WITHOUT COMPLICATION, WITHOUT LONG-TERM CURRENT USE OF INSULIN (H): Primary | ICD-10-CM

## 2023-11-27 PROCEDURE — 99207 PR NO CHARGE LOS: CPT | Mod: VID

## 2023-11-27 RX ORDER — ROSUVASTATIN CALCIUM 10 MG/1
10 TABLET, COATED ORAL DAILY
Qty: 30 TABLET | Refills: 3 | Status: SHIPPED | OUTPATIENT
Start: 2023-11-27 | End: 2024-03-18

## 2023-11-27 NOTE — PATIENT INSTRUCTIONS
"Recommendations from today's MTM visit:                                                      Stop simvastatin     Start rosuvastatin 10mg daily     Follow-up: 12/18/2023 at 9AM    It was great speaking with you today.  I value your experience and would be very thankful for your time in providing feedback in our clinic survey. In the next few days, you may receive an email or text message from [facility] with a link to a survey related to your  clinical pharmacist.\"     To schedule another MTM appointment, please call the clinic directly or you may call the MTM scheduling line at [phone] or toll-free at [phone].     My Clinical Pharmacist's contact information:                                                      Please feel free to contact me with any questions or concerns you have.      Antonieta Ardon, PharmD  Medication Therapy Management Pharmacist   [facility] and [facility]    "

## 2023-12-18 ENCOUNTER — VIRTUAL VISIT (OUTPATIENT)
Dept: PHARMACY | Facility: CLINIC | Age: 68
End: 2023-12-18
Payer: COMMERCIAL

## 2023-12-18 DIAGNOSIS — E78.1 HYPERTRIGLYCERIDEMIA: ICD-10-CM

## 2023-12-18 DIAGNOSIS — E11.9 TYPE 2 DIABETES MELLITUS WITHOUT COMPLICATION, WITHOUT LONG-TERM CURRENT USE OF INSULIN (H): Primary | ICD-10-CM

## 2023-12-18 PROCEDURE — 99207 PR NO CHARGE LOS: CPT | Mod: VID

## 2023-12-18 NOTE — PATIENT INSTRUCTIONS
"Recommendations from today's MTM visit:                                                      Continue Ozempic at 0.5mg dose for 1 more pen or 28 days. Ask pharmacy for a refill.    Future considerations: A1c and lipid labs at end of January     Follow-up: 1/8/24 at 9:00 AM     It was great speaking with you today.  I value your experience and would be very thankful for your time in providing feedback in our clinic survey. In the next few days, you may receive an email or text message from [facility] [employer] with a link to a survey related to your  clinical pharmacist.\"     To schedule another MTM appointment, please call the clinic directly or you may call the MTM scheduling line at [phone] or toll-free at [phone].     My Clinical Pharmacist's contact information:                                                      Please feel free to contact me with any questions or concerns you have.      Antonieta Ardon, PharmD  Medication Therapy Management Pharmacist   [facility] and [facility]    "

## 2023-12-18 NOTE — PROGRESS NOTES
Medication Therapy Management (MTM) Encounter    ASSESSMENT:                            Medication Adherence/Access: No issues identified, patient plans to call [facility] team to start Ozempic PAP application once able to obtain all paperwork.     Diabetes: Recommend continuing on 0.5mg dose of Ozempic for an additional 4 weeks since patient has been tolerating, have plans to increase to 1mg at follow up. Depending on blood sugars could potentially decrease metformin dose in future if blood sugars are at goal with higher doses of Ozempic. Would be due for updated A1c at end of January.     Hyperlipidemia: Would benefit from obtaining updated cholesterol levels since changing to rosuvastatin, will plan to obtain when A1c is due at the end of January. Since effects have improved with medication change thus recommend continuing on current therapy.     PLAN:                            Continue Ozempic at 0.5mg dose for 1 more pen or 28 days. Ask pharmacy for a refill.    Future considerations: A1c and lipid labs at end of January     Follow-up: 1/8/24 at 9:00 AM     SUBJECTIVE/OBJECTIVE:                          Yady Sharma is a 68 year old female contacted via secure video for a follow-up visit from 11/27/2023.       Reason for visit: Diabetes follow up.    Allergies/ADRs: Reviewed in chart  Past Medical History: Reviewed in chart  Tobacco: She reports that she has quit smoking. Her smoking use included cigarettes. She has a 13.00 pack-year smoking history. She has never used smokeless tobacco.  Alcohol: not currently using    Medication Adherence/Access: Patient notes that they are working with [employer] to get on assistance program for Ozempic. At this time they are working to get their proof of income paperwork together.     Diabetes   Metformin 1000mg twice daily - patient notes that metformin gives her burps constantly.   Ozempic 0.5mg weekly - patient notes that they have completed 2 doses at this dose. Denies side  effects at this time.   Blood sugar monitoring: occasionally; Ranges: (patient reported) at random times throughout the day:   Mornin on 124 day average. Highest is 164, lowest 124.   2 hours after meals: no recent readings      Eye exam is up to date  Foot exam is up to date  Urine Albumin:   Lab Results   Component Value Date    UMALCR  2023      Comment:      Unable to calculate, urine albumin and/or urine creatinine is outside detectable limits.  Microalbuminuria is defined as an albumin:creatinine ratio of 17 to 299 for males and 25 to 299 for females. A ratio of albumin:creatinine of 300 or higher is indicative of overt proteinuria.  Due to biologic variability, positive results should be confirmed by a second, first-morning random or 24-hour timed urine specimen. If there is discrepancy, a third specimen is recommended. When 2 out of 3 results are in the microalbuminuria range, this is evidence for incipient nephropathy and warrants increased efforts at glucose control, blood pressure control, and institution of therapy with an angiotensin-converting-enzyme (ACE) inhibitor (if the patient can tolerate it).        Lab Results   Component Value Date    A1C 8.3 (H) 10/31/2023     Hyperlipidemia   Rosuvastatin 10mg daily (chanted from simvastatin at last MTM visit). Patient notes that joint pain has gradually reduced since switching to rosuvastatin.      Recent Labs   Lab Test 10/31/23  1119 23  1046   CHOL 148 114   HDL 42* 37*   LDL 76 46   TRIG 150* 156*     Today's Vitals: LMP  (LMP Unknown)   ----------------  I spent 12 minutes with this patient today. All changes were made via collaborative practice agreement with ASHLEY Chavez CNP. A copy of the visit note was provided to the patient's provider(s).    A summary of these recommendations was sent via [employer].    Antonieta Ardon, PharmD  Medication Therapy Management Pharmacist   [facility] and [facility]      Telemedicine Visit Details  Type of service:  Video Conference via [employer]  Start Time:  9:01 AM  End Time: 9:14 AM     Medication Therapy Recommendations  No medication therapy recommendations to display

## 2024-01-08 ENCOUNTER — VIRTUAL VISIT (OUTPATIENT)
Dept: PHARMACY | Facility: CLINIC | Age: 69
End: 2024-01-08
Payer: COMMERCIAL

## 2024-01-08 DIAGNOSIS — E03.9 ACQUIRED HYPOTHYROIDISM: ICD-10-CM

## 2024-01-08 DIAGNOSIS — E61.2 MAGNESIUM DEFICIENCY: ICD-10-CM

## 2024-01-08 DIAGNOSIS — E11.9 TYPE 2 DIABETES MELLITUS WITHOUT COMPLICATION, WITHOUT LONG-TERM CURRENT USE OF INSULIN (H): ICD-10-CM

## 2024-01-08 DIAGNOSIS — E55.9 VITAMIN D DEFICIENCY: ICD-10-CM

## 2024-01-08 DIAGNOSIS — I10 BENIGN ESSENTIAL HYPERTENSION: ICD-10-CM

## 2024-01-08 DIAGNOSIS — E11.9 TYPE 2 DIABETES MELLITUS WITHOUT COMPLICATION, WITHOUT LONG-TERM CURRENT USE OF INSULIN (H): Primary | ICD-10-CM

## 2024-01-08 DIAGNOSIS — E78.1 HYPERTRIGLYCERIDEMIA: ICD-10-CM

## 2024-01-08 DIAGNOSIS — Z78.0 POSTMENOPAUSAL STATUS: Primary | ICD-10-CM

## 2024-01-08 PROCEDURE — 99207 PR NO CHARGE LOS: CPT | Mod: 95

## 2024-01-08 NOTE — PROGRESS NOTES
Medication Therapy Management (MTM) Encounter    ASSESSMENT:                            Medication Adherence/Access: Plans to work with [place] team to determine if eligible for assistance with Ozempic and other medication costs.     Diabetes: Patient reported blood sugars are at/near fasting goal of  and 2 hour post prandial goal of <180. Plan to increase Ozempic dose for additional blood sugar reduction and based on blood sugars, may potentially be able to reduce metformin dose to avoid side effects. Plan to obtain updated A1c about a month after being on higher dose of Ozempic, lab appointment scheduled.     Hypertension: Stable, most recent clinic blood pressure readings have been at goal of <130/80.    Hypothyroidism: Stable, last TSH is within normal limits.     Hyperlipidemia: Would benefit from obtaining updated cholesterol labs and checking liver function since switching to rosuvastatin, lab appointment scheduled.     Supplements: Would benefit from obtaining updated vitamin D and magnesium labs, lab appointment scheduled. Will consult with [facility] if patient would be indicated for obtaining updated DEXA scan.       PLAN:                            Increase Ozempic to 1mg starting Friday 1/26    Will wait to send in [facility]atin prescription to [facility] after getting updated results in Feb.      Labs obtained in Feb: A1c, cholesterol, CMP, vitamin D, and magnesium     I will message Racquel to see thoughts on getting updated DEXA scan     Call [phone] for ENT appointment.     Follow-up: Pending labs scheduled for 2/15/2024 at 9:15 AM, MTM to follow up after pending results.     SUBJECTIVE/OBJECTIVE:                          Yady hSarma is a 68 year old female contacted via secure video for a follow-up visit from 12/18/2023.       Reason for visit: Diabetes follow up.    Allergies/ADRs: Reviewed in chart  Past Medical History: Reviewed in chart  Tobacco: She reports that she has quit smoking. Her smoking  use included cigarettes. She has a 13 pack-year smoking history. She has never used smokeless tobacco.  Alcohol: not currently using    Medication Adherence/Access: no issues reported, notes that prescriptions are better covered through [facility]. Has an appointment coming up with [facility] to discuss financial assistance with Ozempic and other medications.     Diabetes   Metformin 1000mg twice daily - patient notes that metformin gives her burps constantly.   Ozempic 0.5mg weekly - has had 4 full doses at 0.5mg dose. Notes that she did have a lapse of about 4 days before being able to re-fill Ozempic.   Patient declines side effects with the exception of burps with metformin which overall are tolerable.   Blood sugar monitoring: occasionally; Ranges: (patient reported) at random times throughout the day:   Mornin's. Highest is 160s for a couple of days likely due to diet. Notes that in the evening she has seen blood sugars around 140's.       Eye exam is up to date  Foot exam is up to date  Urine Albumin:   Lab Results   Component Value Date    UMALCR  2023      Comment:      Unable to calculate, urine albumin and/or urine creatinine is outside detectable limits.  Microalbuminuria is defined as an albumin:creatinine ratio of 17 to 299 for males and 25 to 299 for females. A ratio of albumin:creatinine of 300 or higher is indicative of overt proteinuria.  Due to biologic variability, positive results should be confirmed by a second, first-morning random or 24-hour timed urine specimen. If there is discrepancy, a third specimen is recommended. When 2 out of 3 results are in the microalbuminuria range, this is evidence for incipient nephropathy and warrants increased efforts at glucose control, blood pressure control, and institution of therapy with an angiotensin-converting-enzyme (ACE) inhibitor (if the patient can tolerate it).        Lab Results   Component Value Date    A1C 8.3 (H) 10/31/2023  "    Hypertension   Lisinopril-hydrochlorothiazide 10-12.5mg daily   Patient reports no current medication side effects  Patient does not self-monitor blood pressure.       BP Readings from Last 3 Encounters:   10/31/23 133/76   03/13/23 120/60   06/23/22 120/62     Pulse Readings from Last 3 Encounters:   10/31/23 71   03/13/23 80   06/23/22 72     Hypothyroidism    Levothyroxine 175 mcg daily. Patient notes that she daily in the morning with food and other medications.   Patient is having the following symptoms: none.      TSH   Date Value Ref Range Status   10/31/2023 0.43 0.30 - 4.20 uIU/mL Final   12/27/2021 0.30 0.30 - 5.00 uIU/mL Final     Hyperlipidemia   Rosuvastatin 10mg daily (changed from simvastatin). Patient notes that joint pain has gradually reduced since switching to rosuvastatin.      Recent Labs   Lab Test 10/31/23  1119 03/13/23  1046   CHOL 148 114   HDL 42* 37*   LDL 76 46   TRIG 150* 156*     Supplements:  Vitamin D 2000 international unit(s) daily   Magnesium 250mg at bedtime - notes that this is effective for leg cramps  Multivitamin 1 tablet daily   Last DEXA was in 2014 and showed normal results.   Vitamin D Deficiency Screening Results:  No results found for: \"VITDT\"    Magnesium   Date Value Ref Range Status   05/20/2021 1.9 1.8 - 2.6 mg/dL Final     Today's Vitals: LMP  (LMP Unknown)   ----------------  I spent 32 minutes with this patient today. All changes were made via collaborative practice agreement with ASHLEY Chavez CNP. A copy of the visit note was provided to the patient's provider(s).    A summary of these recommendations was sent via [employer].    Antonieta Ardon, PharmD  Medication Therapy Management Pharmacist   [facility] and [facility]     Telemedicine Visit Details  Type of service:  Video Conference via [employer]  Start Time:  8:59 AM  End Time: 9:32 AM     Medication Therapy Recommendations  Diabetes mellitus (H)    Current Medication: " Semaglutide, 1 MG/DOSE, (OZEMPIC) 4 MG/3ML pen   Rationale: Dose too low - Dosage too low - Effectiveness   Recommendation: Increase Dose   Status: Accepted per CPA         Vitamin D deficiency    Current Medication: cholecalciferol, vitamin D3, 50 mcg (2,000 unit) Tab   Rationale: Medication requires monitoring - Needs additional monitoring   Recommendation: Order Lab   Status: Accepted per CPA

## 2024-01-08 NOTE — PATIENT INSTRUCTIONS
"Recommendations from today's MTM visit:                                                      Increase Ozempic to 1mg starting Friday 1/26    Will wait to send in rosuvastatin prescription to [facility] after getting updated results in Feb.      Labs obtained in Feb: A1c, cholesterol, CMP, vitamin D, and magnesium     I will message [facility] to see thoughts on getting updated DEXA scan     Call [phone] for ENT appointment.     Follow-up: Pending labs scheduled for 2/15/2024 at 9:15 AM, MTM to follow up after pending results.     It was great speaking with you today.  I value your experience and would be very thankful for your time in providing feedback in our clinic survey. In the next few days, you may receive an email or text message from [employer] with a link to a survey related to your  clinical pharmacist.\"     To schedule another MTM appointment, please call the clinic directly or you may call the MTM scheduling line at [phone].    My Clinical Pharmacist's contact information:                                                      Please feel free to contact me with any questions or concerns you have.      Antonieta Ardon, PharmD  Medication Therapy Management Pharmacist   [facility] and [facility]    "

## 2024-01-08 NOTE — Clinical Note
Marta Clement,   Do you think patient should get updated DEXA at this time?   Antonieta Ardon, PharmD Medication Therapy Management Pharmacist  [facility]

## 2024-01-23 ENCOUNTER — TELEPHONE (OUTPATIENT)
Dept: FAMILY MEDICINE | Facility: CLINIC | Age: 69
End: 2024-01-23
Payer: COMMERCIAL

## 2024-01-23 NOTE — TELEPHONE ENCOUNTER
Jan 23, 2023 I spoke with Yady, she is in need of financial assistance for medication.    We reviewed the Pharmacy Assistance Fund $500, the Prescription Assistance Program for manfacturer assistance programs, gross income, insurance and Rx list.     assistance applications will be completed for: Ozempic 1mg. Yady informed me this is the new dose starting Jan 26, 2024.    We will contact Yady when the application has been sent.    When approved, Yady will receive this medication at no cost through December 2024.    Graciela Oconnell  Prescription Assistance Supervisor  Pharmacy Assistance

## 2024-02-16 DIAGNOSIS — E11.9 TYPE 2 DIABETES MELLITUS WITHOUT COMPLICATION, WITHOUT LONG-TERM CURRENT USE OF INSULIN (H): ICD-10-CM

## 2024-02-16 RX ORDER — SEMAGLUTIDE 1.34 MG/ML
INJECTION, SOLUTION SUBCUTANEOUS
Qty: 6 ML | Refills: 5 | OUTPATIENT
Start: 2024-02-16

## 2024-02-19 ENCOUNTER — LAB (OUTPATIENT)
Dept: LAB | Facility: CLINIC | Age: 69
End: 2024-02-19
Payer: COMMERCIAL

## 2024-02-19 DIAGNOSIS — E61.2 MAGNESIUM DEFICIENCY: ICD-10-CM

## 2024-02-19 DIAGNOSIS — E55.9 VITAMIN D DEFICIENCY: ICD-10-CM

## 2024-02-19 DIAGNOSIS — E11.9 TYPE 2 DIABETES MELLITUS WITHOUT COMPLICATION, WITHOUT LONG-TERM CURRENT USE OF INSULIN (H): ICD-10-CM

## 2024-02-19 LAB
ALBUMIN SERPL BCG-MCNC: 4.3 G/DL (ref 3.5–5.2)
ALP SERPL-CCNC: 53 U/L (ref 40–150)
ALT SERPL W P-5'-P-CCNC: 24 U/L (ref 0–50)
ANION GAP SERPL CALCULATED.3IONS-SCNC: 9 MMOL/L (ref 7–15)
AST SERPL W P-5'-P-CCNC: 24 U/L (ref 0–45)
BILIRUB SERPL-MCNC: 0.5 MG/DL
BUN SERPL-MCNC: 13.9 MG/DL (ref 8–23)
CALCIUM SERPL-MCNC: 9.3 MG/DL (ref 8.8–10.2)
CHLORIDE SERPL-SCNC: 102 MMOL/L (ref 98–107)
CHOLEST SERPL-MCNC: 92 MG/DL
CREAT SERPL-MCNC: 0.79 MG/DL (ref 0.51–0.95)
DEPRECATED HCO3 PLAS-SCNC: 30 MMOL/L (ref 22–29)
EGFRCR SERPLBLD CKD-EPI 2021: 81 ML/MIN/1.73M2
FASTING STATUS PATIENT QL REPORTED: YES
GLUCOSE SERPL-MCNC: 136 MG/DL (ref 70–99)
HBA1C MFR BLD: 7 % (ref 0–5.6)
HDLC SERPL-MCNC: 41 MG/DL
LDLC SERPL CALC-MCNC: 35 MG/DL
MAGNESIUM SERPL-MCNC: 2.3 MG/DL (ref 1.7–2.3)
NONHDLC SERPL-MCNC: 51 MG/DL
POTASSIUM SERPL-SCNC: 4.4 MMOL/L (ref 3.4–5.3)
PROT SERPL-MCNC: 6.9 G/DL (ref 6.4–8.3)
SODIUM SERPL-SCNC: 141 MMOL/L (ref 135–145)
TRIGL SERPL-MCNC: 81 MG/DL
VIT D+METAB SERPL-MCNC: 71 NG/ML (ref 20–50)

## 2024-02-19 PROCEDURE — 36415 COLL VENOUS BLD VENIPUNCTURE: CPT

## 2024-02-19 PROCEDURE — 83735 ASSAY OF MAGNESIUM: CPT

## 2024-02-19 PROCEDURE — 80053 COMPREHEN METABOLIC PANEL: CPT

## 2024-02-19 PROCEDURE — 83036 HEMOGLOBIN GLYCOSYLATED A1C: CPT

## 2024-02-19 PROCEDURE — 82306 VITAMIN D 25 HYDROXY: CPT

## 2024-02-19 PROCEDURE — 80061 LIPID PANEL: CPT

## 2024-02-25 ENCOUNTER — MYC REFILL (OUTPATIENT)
Dept: PHARMACY | Facility: CLINIC | Age: 69
End: 2024-02-25
Payer: COMMERCIAL

## 2024-02-25 DIAGNOSIS — E11.9 TYPE 2 DIABETES MELLITUS WITHOUT COMPLICATION, WITHOUT LONG-TERM CURRENT USE OF INSULIN (H): ICD-10-CM

## 2024-03-08 ENCOUNTER — MYC REFILL (OUTPATIENT)
Dept: PHARMACY | Facility: CLINIC | Age: 69
End: 2024-03-08
Payer: COMMERCIAL

## 2024-03-08 DIAGNOSIS — E11.9 TYPE 2 DIABETES MELLITUS WITHOUT COMPLICATION, WITHOUT LONG-TERM CURRENT USE OF INSULIN (H): ICD-10-CM

## 2024-03-17 DIAGNOSIS — E78.1 HYPERTRIGLYCERIDEMIA: ICD-10-CM

## 2024-03-18 RX ORDER — ROSUVASTATIN CALCIUM 10 MG/1
10 TABLET, COATED ORAL DAILY
Qty: 90 TABLET | Refills: 1 | Status: SHIPPED | OUTPATIENT
Start: 2024-03-18 | End: 2024-06-12

## 2024-04-01 ENCOUNTER — MYC REFILL (OUTPATIENT)
Dept: FAMILY MEDICINE | Facility: CLINIC | Age: 69
End: 2024-04-01
Payer: COMMERCIAL

## 2024-04-01 DIAGNOSIS — E78.1 HYPERTRIGLYCERIDEMIA: ICD-10-CM

## 2024-04-01 RX ORDER — ROSUVASTATIN CALCIUM 10 MG/1
10 TABLET, COATED ORAL DAILY
Qty: 90 TABLET | Refills: 1 | OUTPATIENT
Start: 2024-04-01

## 2024-04-16 DIAGNOSIS — E11.9 TYPE 2 DIABETES MELLITUS WITHOUT COMPLICATION, WITHOUT LONG-TERM CURRENT USE OF INSULIN (H): ICD-10-CM

## 2024-04-16 RX ORDER — SEMAGLUTIDE 1.34 MG/ML
INJECTION, SOLUTION SUBCUTANEOUS
Qty: 3 ML | Refills: 1 | Status: SHIPPED | OUTPATIENT
Start: 2024-04-16 | End: 2024-07-22

## 2024-05-13 ENCOUNTER — APPOINTMENT (OUTPATIENT)
Dept: URBAN - METROPOLITAN AREA CLINIC 254 | Age: 69
Setting detail: DERMATOLOGY
End: 2024-05-14

## 2024-05-13 VITALS — WEIGHT: 197.8 LBS | HEIGHT: 66 IN

## 2024-05-13 DIAGNOSIS — Z71.89 OTHER SPECIFIED COUNSELING: ICD-10-CM

## 2024-05-13 DIAGNOSIS — I78.8 OTHER DISEASES OF CAPILLARIES: ICD-10-CM

## 2024-05-13 PROCEDURE — 99202 OFFICE O/P NEW SF 15 MIN: CPT

## 2024-05-13 PROCEDURE — OTHER REASSURANCE: OTHER

## 2024-05-13 PROCEDURE — OTHER COUNSELING: OTHER

## 2024-05-13 PROCEDURE — OTHER MIPS QUALITY: OTHER

## 2024-05-13 ASSESSMENT — LOCATION DETAILED DESCRIPTION DERM: LOCATION DETAILED: NASAL DORSUM

## 2024-05-13 ASSESSMENT — LOCATION SIMPLE DESCRIPTION DERM: LOCATION SIMPLE: NOSE

## 2024-05-13 ASSESSMENT — LOCATION ZONE DERM: LOCATION ZONE: NOSE

## 2024-05-13 NOTE — HPI: SKIN LESION
What Type Of Note Output Would You Prefer (Optional)?: Bullet Format
How Severe Is Your Skin Lesion?: mild
treated_been_treated
Is This A New Presentation, Or A Follow-Up?: Skin Lesion
Additional History: Patient reports that this lesion has been treated previously three times with liquid nitrogen.
Which Family Member (Optional)?: Father, of the nose.
When Was It Treated?: 2019

## 2024-05-13 NOTE — PROCEDURE: REASSURANCE
Additional Notes (Optional): Provided patient with reassurance, but advised patient to continue to monitor. Discussed with patient that this can be treated cosmetically with electrocautery vs laser if desired but otherwise not concerning.
Detail Level: Detailed
Hide Additional Notes?: No

## 2024-06-05 ENCOUNTER — TELEPHONE (OUTPATIENT)
Dept: PHARMACY | Facility: CLINIC | Age: 69
End: 2024-06-05
Payer: COMMERCIAL

## 2024-06-05 NOTE — TELEPHONE ENCOUNTER
Called and spoke with patient, patient notes that they continue to use Ozempic 1mg weekly at this time but has not been able to call the [employer] [facility] Program team yet to see if they would qualify for assistance on the cost thus provided number ([phone]).     Patient declines any side effects on Ozempic.     Plan to have new MT pharmacist reach out to patient for follow up once they are able to transition into their new role.     Antonieta Ardon, PharmD  Medication Therapy Management Pharmacist   [facility]

## 2024-06-12 DIAGNOSIS — E78.1 HYPERTRIGLYCERIDEMIA: ICD-10-CM

## 2024-06-12 RX ORDER — ROSUVASTATIN CALCIUM 10 MG/1
10 TABLET, COATED ORAL DAILY
Qty: 90 TABLET | Refills: 0 | Status: SHIPPED | OUTPATIENT
Start: 2024-06-12 | End: 2024-09-30

## 2024-06-13 DIAGNOSIS — E11.9 TYPE 2 DIABETES MELLITUS WITHOUT COMPLICATION, WITHOUT LONG-TERM CURRENT USE OF INSULIN (H): ICD-10-CM

## 2024-07-22 DIAGNOSIS — E11.9 TYPE 2 DIABETES MELLITUS WITHOUT COMPLICATION, WITHOUT LONG-TERM CURRENT USE OF INSULIN (H): ICD-10-CM

## 2024-07-22 RX ORDER — SEMAGLUTIDE 1.34 MG/ML
INJECTION, SOLUTION SUBCUTANEOUS
Qty: 6 ML | Refills: 5 | Status: SHIPPED | OUTPATIENT
Start: 2024-07-22

## 2024-07-29 ENCOUNTER — MYC REFILL (OUTPATIENT)
Dept: FAMILY MEDICINE | Facility: CLINIC | Age: 69
End: 2024-07-29
Payer: COMMERCIAL

## 2024-07-29 DIAGNOSIS — E11.9 TYPE 2 DIABETES MELLITUS WITHOUT COMPLICATION, WITHOUT LONG-TERM CURRENT USE OF INSULIN (H): ICD-10-CM

## 2024-08-29 ENCOUNTER — TELEPHONE (OUTPATIENT)
Dept: PHARMACY | Facility: CLINIC | Age: 69
End: 2024-08-29
Payer: COMMERCIAL

## 2024-08-29 NOTE — TELEPHONE ENCOUNTER
Called and spoke to patient. Relays that she has been out of Ozempic for 2 weeks as she could not afford a refill. Has forms to fill out for PAP but has not completed them yet. Declined scheduling a MTM follow-up visit today as recommended. MTM will attempt to reach out again in 1 month to see if patient has filled out forms.      Carine Man PharmD  Medication Therapy Management (MTM) Pharmacist   [place] and [facility]

## 2024-09-29 ENCOUNTER — HEALTH MAINTENANCE LETTER (OUTPATIENT)
Age: 69
End: 2024-09-29

## 2024-09-29 DIAGNOSIS — E78.1 HYPERTRIGLYCERIDEMIA: ICD-10-CM

## 2024-09-30 RX ORDER — ROSUVASTATIN CALCIUM 10 MG/1
10 TABLET, COATED ORAL DAILY
Qty: 100 TABLET | Refills: 0 | Status: SHIPPED | OUTPATIENT
Start: 2024-09-30

## 2024-10-30 DIAGNOSIS — E11.9 TYPE 2 DIABETES MELLITUS WITHOUT COMPLICATION, WITHOUT LONG-TERM CURRENT USE OF INSULIN (H): ICD-10-CM

## 2024-11-06 ENCOUNTER — OFFICE VISIT (OUTPATIENT)
Dept: FAMILY MEDICINE | Facility: CLINIC | Age: 69
End: 2024-11-06
Payer: COMMERCIAL

## 2024-11-06 VITALS
RESPIRATION RATE: 16 BRPM | DIASTOLIC BLOOD PRESSURE: 68 MMHG | BODY MASS INDEX: 33.11 KG/M2 | HEART RATE: 64 BPM | OXYGEN SATURATION: 96 % | HEIGHT: 66 IN | SYSTOLIC BLOOD PRESSURE: 128 MMHG | WEIGHT: 206 LBS | TEMPERATURE: 97.6 F

## 2024-11-06 DIAGNOSIS — E55.9 VITAMIN D DEFICIENCY: ICD-10-CM

## 2024-11-06 DIAGNOSIS — I10 BENIGN ESSENTIAL HYPERTENSION: ICD-10-CM

## 2024-11-06 DIAGNOSIS — Z00.00 ENCOUNTER FOR MEDICARE ANNUAL WELLNESS EXAM: Primary | ICD-10-CM

## 2024-11-06 DIAGNOSIS — E03.9 HYPOTHYROIDISM, UNSPECIFIED TYPE: ICD-10-CM

## 2024-11-06 DIAGNOSIS — Z79.899 MEDICATION MANAGEMENT: ICD-10-CM

## 2024-11-06 DIAGNOSIS — E78.2 MIXED HYPERLIPIDEMIA: ICD-10-CM

## 2024-11-06 DIAGNOSIS — E11.9 TYPE 2 DIABETES MELLITUS WITHOUT COMPLICATION, WITHOUT LONG-TERM CURRENT USE OF INSULIN (H): ICD-10-CM

## 2024-11-06 DIAGNOSIS — E66.811 CLASS 1 OBESITY WITH SERIOUS COMORBIDITY AND BODY MASS INDEX (BMI) OF 33.0 TO 33.9 IN ADULT, UNSPECIFIED OBESITY TYPE: ICD-10-CM

## 2024-11-06 PROBLEM — E66.01 MORBID OBESITY (H): Status: RESOLVED | Noted: 2018-09-17 | Resolved: 2024-11-06

## 2024-11-06 LAB
ALBUMIN SERPL BCG-MCNC: 4.2 G/DL (ref 3.5–5.2)
ALBUMIN UR-MCNC: NEGATIVE MG/DL
ALP SERPL-CCNC: 59 U/L (ref 40–150)
ALT SERPL W P-5'-P-CCNC: 23 U/L (ref 0–50)
ANION GAP SERPL CALCULATED.3IONS-SCNC: 10 MMOL/L (ref 7–15)
APPEARANCE UR: CLEAR
AST SERPL W P-5'-P-CCNC: 22 U/L (ref 0–45)
BILIRUB SERPL-MCNC: 0.4 MG/DL
BILIRUB UR QL STRIP: NEGATIVE
BUN SERPL-MCNC: 16.3 MG/DL (ref 8–23)
CALCIUM SERPL-MCNC: 9.9 MG/DL (ref 8.8–10.4)
CHLORIDE SERPL-SCNC: 100 MMOL/L (ref 98–107)
CHOLEST SERPL-MCNC: 110 MG/DL
COLOR UR AUTO: YELLOW
CREAT SERPL-MCNC: 0.77 MG/DL (ref 0.51–0.95)
CREAT UR-MCNC: 64.5 MG/DL
EGFRCR SERPLBLD CKD-EPI 2021: 83 ML/MIN/1.73M2
ERYTHROCYTE [DISTWIDTH] IN BLOOD BY AUTOMATED COUNT: 12.7 % (ref 10–15)
EST. AVERAGE GLUCOSE BLD GHB EST-MCNC: 177 MG/DL
FASTING STATUS PATIENT QL REPORTED: ABNORMAL
FASTING STATUS PATIENT QL REPORTED: ABNORMAL
GLUCOSE SERPL-MCNC: 117 MG/DL (ref 70–99)
GLUCOSE UR STRIP-MCNC: NEGATIVE MG/DL
HBA1C MFR BLD: 7.8 % (ref 0–5.6)
HCO3 SERPL-SCNC: 28 MMOL/L (ref 22–29)
HCT VFR BLD AUTO: 40.7 % (ref 35–47)
HDLC SERPL-MCNC: 48 MG/DL
HGB BLD-MCNC: 13.3 G/DL (ref 11.7–15.7)
HGB UR QL STRIP: NEGATIVE
KETONES UR STRIP-MCNC: NEGATIVE MG/DL
LDLC SERPL CALC-MCNC: 35 MG/DL
LEUKOCYTE ESTERASE UR QL STRIP: NEGATIVE
MCH RBC QN AUTO: 29.4 PG (ref 26.5–33)
MCHC RBC AUTO-ENTMCNC: 32.7 G/DL (ref 31.5–36.5)
MCV RBC AUTO: 90 FL (ref 78–100)
MICROALBUMIN UR-MCNC: <12 MG/L
MICROALBUMIN/CREAT UR: NORMAL MG/G{CREAT}
NITRATE UR QL: NEGATIVE
NONHDLC SERPL-MCNC: 62 MG/DL
PH UR STRIP: 7 [PH] (ref 5–8)
PLATELET # BLD AUTO: 326 10E3/UL (ref 150–450)
POTASSIUM SERPL-SCNC: 4.5 MMOL/L (ref 3.4–5.3)
PROT SERPL-MCNC: 7 G/DL (ref 6.4–8.3)
RBC # BLD AUTO: 4.52 10E6/UL (ref 3.8–5.2)
SODIUM SERPL-SCNC: 138 MMOL/L (ref 135–145)
SP GR UR STRIP: 1.02 (ref 1–1.03)
T4 FREE SERPL-MCNC: 1.62 NG/DL (ref 0.9–1.7)
TRIGL SERPL-MCNC: 134 MG/DL
TSH SERPL DL<=0.005 MIU/L-ACNC: 0.1 UIU/ML (ref 0.3–4.2)
UROBILINOGEN UR STRIP-ACNC: 0.2 E.U./DL
VIT D+METAB SERPL-MCNC: 48 NG/ML (ref 20–50)
WBC # BLD AUTO: 9.1 10E3/UL (ref 4–11)

## 2024-11-06 PROCEDURE — 80053 COMPREHEN METABOLIC PANEL: CPT | Performed by: NURSE PRACTITIONER

## 2024-11-06 PROCEDURE — 82570 ASSAY OF URINE CREATININE: CPT | Performed by: NURSE PRACTITIONER

## 2024-11-06 PROCEDURE — 36415 COLL VENOUS BLD VENIPUNCTURE: CPT | Performed by: NURSE PRACTITIONER

## 2024-11-06 PROCEDURE — 84443 ASSAY THYROID STIM HORMONE: CPT | Performed by: NURSE PRACTITIONER

## 2024-11-06 PROCEDURE — 90662 IIV NO PRSV INCREASED AG IM: CPT | Performed by: NURSE PRACTITIONER

## 2024-11-06 PROCEDURE — 81003 URINALYSIS AUTO W/O SCOPE: CPT | Performed by: NURSE PRACTITIONER

## 2024-11-06 PROCEDURE — G0008 ADMIN INFLUENZA VIRUS VAC: HCPCS | Performed by: NURSE PRACTITIONER

## 2024-11-06 PROCEDURE — 82043 UR ALBUMIN QUANTITATIVE: CPT | Performed by: NURSE PRACTITIONER

## 2024-11-06 PROCEDURE — 84439 ASSAY OF FREE THYROXINE: CPT | Performed by: NURSE PRACTITIONER

## 2024-11-06 PROCEDURE — 82306 VITAMIN D 25 HYDROXY: CPT | Performed by: NURSE PRACTITIONER

## 2024-11-06 PROCEDURE — 83036 HEMOGLOBIN GLYCOSYLATED A1C: CPT | Performed by: NURSE PRACTITIONER

## 2024-11-06 PROCEDURE — 99214 OFFICE O/P EST MOD 30 MIN: CPT | Mod: 25 | Performed by: NURSE PRACTITIONER

## 2024-11-06 PROCEDURE — 85027 COMPLETE CBC AUTOMATED: CPT | Performed by: NURSE PRACTITIONER

## 2024-11-06 PROCEDURE — 80061 LIPID PANEL: CPT | Performed by: NURSE PRACTITIONER

## 2024-11-06 PROCEDURE — G0439 PPPS, SUBSEQ VISIT: HCPCS | Performed by: NURSE PRACTITIONER

## 2024-11-06 RX ORDER — LEVOTHYROXINE SODIUM 175 UG/1
175 TABLET ORAL DAILY
Qty: 90 TABLET | Refills: 3 | Status: SHIPPED | OUTPATIENT
Start: 2024-11-06

## 2024-11-06 RX ORDER — ROSUVASTATIN CALCIUM 10 MG/1
10 TABLET, COATED ORAL DAILY
Qty: 90 TABLET | Refills: 3 | Status: SHIPPED | OUTPATIENT
Start: 2024-11-06

## 2024-11-06 RX ORDER — LISINOPRIL AND HYDROCHLOROTHIAZIDE 10; 12.5 MG/1; MG/1
1 TABLET ORAL DAILY
Qty: 90 TABLET | Refills: 3 | Status: SHIPPED | OUTPATIENT
Start: 2024-11-06

## 2024-11-06 SDOH — HEALTH STABILITY: PHYSICAL HEALTH: ON AVERAGE, HOW MANY DAYS PER WEEK DO YOU ENGAGE IN MODERATE TO STRENUOUS EXERCISE (LIKE A BRISK WALK)?: 1 DAY

## 2024-11-06 ASSESSMENT — PAIN SCALES - GENERAL: PAINLEVEL_OUTOF10: NO PAIN (0)

## 2024-11-06 ASSESSMENT — SOCIAL DETERMINANTS OF HEALTH (SDOH): HOW OFTEN DO YOU GET TOGETHER WITH FRIENDS OR RELATIVES?: ONCE A WEEK

## 2024-11-06 NOTE — PROGRESS NOTES
Preventive Care Visit  [facility]  ASHLEY Chavez CNP, Family Medicine  Nov 6, 2024      Assessment & Plan     Encounter for Medicare annual wellness exam  Recommend consuming a healthy diet and exercising.  She declines hepatitis C screening.  Influenza vaccine provided.  She will consider obtaining the shingles, RSV, but COVID vaccines at the pharmacy.  She is up-to-date on breast cancer and colorectal cancer screening.  She is due for DEXA scan.  - CBC with platelets  - UA Macroscopic with reflex to Microscopic and Culture  - UA Macroscopic with reflex to Microscopic and Culture    Type 2 diabetes mellitus without complication, without long-term current use of insulin (H)  Will check A1c.  She could not afford Trulicity or Ozempic.  She continues metformin.  - metFORMIN (GLUCOPHAGE) 500 MG tablet  Dispense: 400 tablet; Refill: 0  - Hemoglobin A1c    Benign essential hypertension  This is controlled with lisinopril-hydrochlorothiazide.  - lisinopril-hydrochlorothiazide (ZESTORETIC) 10-12.5 MG tablet  Dispense: 90 tablet; Refill: 3    Mixed hyperlipidemia  She continues rosuvastatin.  - Lipid panel reflex to direct LDL Fasting    Hypothyroidism, unspecified type  Will check thyroid cascade and adjust levothyroxine accordingly.  - TSH with free T4 reflex  - levothyroxine (SYNTHROID/LEVOTHROID) 175 MCG tablet  Dispense: 90 tablet; Refill: 3    Vitamin D deficiency  She has a history of vitamin D overdose.  She is not currently taking a supplement.  Will check vitamin D.  - Vitamin D Deficiency    Class 1 obesity with serious comorbidity and body mass index (BMI) of 33.0 to 33.9 in adult, unspecified obesity type  Recommend consuming a healthy diet and exercising.  This is contributing to diabetes, hyperlipidemia, hypertension.    Medication management  - Comprehensive metabolic panel          BMI  Estimated body mass index is 33.25 kg/m  as calculated from the following:    Height as of  "this encounter: 1.676 m (5' 6\").    Weight as of this encounter: 93.4 kg (206 lb).   Weight management plan: Discussed healthy diet and exercise guidelines    Counseling  Appropriate preventive services were addressed with this patient via screening, questionnaire, or discussion as appropriate for fall prevention, nutrition, physical activity, Tobacco-use cessation, social engagement, weight loss and cognition.  Checklist reviewing preventive services available has been given to the patient.  Reviewed patient's diet, addressing concerns and/or questions.   She is at risk for lack of exercise and has been provided with information to increase physical activity for the benefit of her well-being.   The patient was instructed to see the dentist every 6 months.   She is at risk for psychosocial distress and has been provided with information to reduce risk.   The patient was provided with written information regarding signs of hearing loss.         Alee Conteh is a 69 year old, presenting for the following:  Wellness Visit        11/6/2024    10:21 AM   Additional Questions   Roomed by Sarina SANCHEZ  Patient has been a relationship with a female for over 30 years.  She has 2 sons ages 24 and 20 who are adopted.  She has no concerns for STDs.  Her last menstrual period was at the age of 49.  She has not had any vaginal bleeding since.  She does have type 2 diabetes and is taking metformin 1000 mg twice daily.  Last A1c was 7% on 2/19/24.  She checks random blood sugars and they have been ranging 130-140.  She discontinued Trulicity because she cannot afford it.  Patient replaced it with Ozempic, but states she could not afford the medication either.  She stopped using Ozempic 2 months ago.  She states she feels better without the medication.  She is consuming a healthy diet.  She walks for exercise.  She has hypertension which is controlled lisinopril-hydrochlorothiazide 10-12.5 mg daily.  She has " hypothyroidism which is controlled with levothyroxine 175 mcg daily.  Last TSH was 0.43 on 10/31/23.  She is taking rosuvastatin 10 mg daily for lipid management.  She has a history of smoking and quit in 1983      Health Care Directive  Patient does not have a Health Care Directive: Discussed advance care planning with patient; however, patient declined at this time.      11/6/2024   General Health   How would you rate your overall physical health? Good   Feel stress (tense, anxious, or unable to sleep) Only a little      (!) STRESS CONCERN      11/6/2024   Nutrition   Diet: Regular (no restrictions)            11/6/2024   Exercise   Days per week of moderate/strenous exercise 1 day      (!) EXERCISE CONCERN      11/6/2024   Social Factors   Frequency of gathering with friends or relatives Once a week   Worry food won't last until get money to buy more No   Food not last or not have enough money for food? No   Do you have housing? (Housing is defined as stable permanent housing and does not include staying ouside in a car, in a tent, in an abandoned building, in an overnight shelter, or couch-surfing.) Yes   Are you worried about losing your housing? No   Lack of transportation? No   Unable to get utilities (heat,electricity)? No            11/6/2024   Fall Risk   Fallen 2 or more times in the past year? No    Trouble with walking or balance? No        Patient-reported          11/6/2024   Activities of Daily Living- Home Safety   Needs help with the following daily activites None of the above   Safety concerns in the home None of the above            11/6/2024   Dental   Dentist two times every year? (!) NO            11/6/2024   Hearing Screening   Hearing concerns? (!) I NEED TO ASK PEOPLE TO SPEAK UP OR REPEAT THEMSELVES.    (!) IT'S HARD TO FOLLOW A CONVERSATION IN A NOISY RESTAURANT OR CROWDED ROOM.       Multiple values from one day are sorted in reverse-chronological order         11/6/2024   Driving Risk  Screening   Patient/family members have concerns about driving No            11/6/2024   General Alertness/Fatigue Screening   Have you been more tired than usual lately? No            11/6/2024   Urinary Incontinence Screening   Bothered by leaking urine in past 6 months No            11/6/2024   TB Screening   Were you born outside of the [place]? No            Today's PHQ-2 Score:       11/6/2024     9:59 AM   PHQ-2 ( 1999 [employer])   Q1: Little interest or pleasure in doing things 0    Q2: Feeling down, depressed or hopeless 0    PHQ-2 Score 0    Q1: Little interest or pleasure in doing things Not at all   Q2: Feeling down, depressed or hopeless Not at all   PHQ-2 Score 0       Patient-reported           11/6/2024   Substance Use   Alcohol more than 3/day or more than 7/wk No   Do you have a current opioid prescription? No   How severe/bad is pain from 1 to 10? 2/10   Do you use any other substances recreationally? No        Social History     Tobacco Use    Smoking status: Former     Current packs/day: 1.00     Average packs/day: 1 pack/day for 13.0 years (13.0 ttl pk-yrs)     Types: Cigarettes     Passive exposure: Past    Smokeless tobacco: Never    Tobacco comments:     quit in 1983   Vaping Use    Vaping status: Never Used   Substance Use Topics    Alcohol use: Yes     Alcohol/week: 2.0 standard drinks of alcohol     Types: 2 Standard drinks or equivalent per week    Drug use: No           5/30/2023   LAST FHS-7 RESULTS   1st degree relative breast or ovarian cancer No   Any relative bilateral breast cancer No   Any male have breast cancer No   Any ONE woman have BOTH breast AND ovarian cancer No   Any woman with breast cancer before 50yrs No   2 or more relatives with breast AND/OR ovarian cancer No   2 or more relatives with breast AND/OR bowel cancer No           Mammogram Screening - Mammogram every 1-2 years updated in Health Maintenance based on mutual decision making      History of abnormal Pap smear: No -  age 65 or older with adequate negative prior screening test results (3 consecutive negative cytology results, 2 consecutive negative cotesting results, or 2 consecutive negative HrHPV test results within 10 years, with the most recent test occurring within the recommended screening interval for the test used)        Latest Ref Rng & Units 4/11/2019    12:09 PM   PAP / HPV   PAP Negative for squamous intraepithelial lesion or malignancy. Negative for squamous intraepithelial lesion or malignancy  Electronically signed by Jacquelin Currie CT (ASCP) on 4/16/2019 at  3:27 PM      HPV 16 DNA NEG Negative    HPV 18 DNA NEG Negative    Other HR HPV NEG Negative      ASCVD Risk   The ASCVD Risk score (Mino DK, et al., 2019) failed to calculate for the following reasons:    The valid total cholesterol range is 130 to 320 mg/dL          Reviewed and updated as needed this visit by Provider                    History reviewed. No pertinent past medical history.  Past Surgical History:   Procedure Laterality Date    C SHLDR ARTHROSCOP,DIAGNOSTIC      Description: Arthroscopy Shoulder Right;  Recorded: 08/15/2007;  Comments: w/decompression    CHOLECYSTECTOMY      HC KNEE SCOPE, DIAGNOSTIC      Description: Arthroscopy Knee Left;  Recorded: 08/15/2007;    HC KNEE SCOPE, DIAGNOSTIC      Description: Arthroscopy Knee Left;  Proc Date: 08/01/2007;    HC REDUCTION OF LARGE BREAST      Description: Breast Surgery Reduction Procedure Bilateral;  Recorded: 08/15/2007;    MAMMOPLASTY REDUCTION Bilateral Around 2008    MT STAPEDECTOMY      Description: Stapedectomy;  Recorded: 08/15/2007;  Comments: assoc hearing loss---uses aid    ZZC APPENDECTOMY,RUPT APPENDX+ABSCESS      Description: Appendectomy For Ruptured Appendix;  Proc Date: 10/01/2005;     Current Outpatient Medications   Medication Sig Dispense Refill    aspirin 81 MG EC tablet [ASPIRIN 81 MG EC TABLET] Take 81 mg by mouth daily.      blood glucose (ACCU-CHEK  RENATE PLUS) test strip Use to test blood sugar once daily or as directed. 100 strip 3    blood-glucose meter Misc [BLOOD-GLUCOSE METER MISC] Use 1 Device As Directed daily. 1 each 0    cholecalciferol, vitamin D3, 50 mcg (2,000 unit) Tab [CHOLECALCIFEROL, VITAMIN D3, 50 MCG (2,000 UNIT) TAB] Take 2,000 Units by mouth daily.      generic lancets (FINGERSTIX LANCETS) [GENERIC LANCETS (FINGERSTIX LANCETS)] Dispense brand per patient's insurance at pharmacy discretion. 100 each 3    levothyroxine (SYNTHROID/LEVOTHROID) 175 MCG tablet Take 1 tablet (175 mcg) by mouth daily 90 tablet 2    lisinopril-hydrochlorothiazide (ZESTORETIC) 10-12.5 MG tablet Take 1 tablet by mouth daily 90 tablet 2    magnesium 250 MG tablet Take 1 tablet by mouth at bedtime      metFORMIN (GLUCOPHAGE) 500 MG tablet Take 2 tablets (1,000 mg) by mouth 2 times daily (with meals). 400 tablet 0    multivitamin, therapeutic (THERA-VIT) TABS tablet Take 1 tablet by mouth daily      rosuvastatin (CRESTOR) 10 MG tablet Take 1 tablet (10 mg) by mouth daily. 90 tablet 3    OZEMPIC, 1 MG/DOSE, 4 MG/3ML pen INJECT SUBCUTANEOUSLY 1 MG EVERY WEEK (Patient not taking: Reported on 11/6/2024) 6 mL 5     Current providers sharing in care for this patient include:  Patient Care Team:  Racquel Dunn APRN CNP as PCP - General (Family Practice)  Racquel Dunn APRN CNP as Assigned PCP  Manny Dodson RPH as Assigned MTM Pharmacist  Carine Man RPH as Pharmacist (Pharmacist)    The following health maintenance items are reviewed in Epic and correct as of today:  Health Maintenance   Topic Date Due    HEPATITIS C SCREENING  Never done    ZOSTER IMMUNIZATION (1 of 2) Never done    LUNG CANCER SCREENING  Never done    RSV VACCINE (1 - Risk 60-74 years 1-dose series) Never done    MICROALBUMIN  03/13/2024    DIABETIC FOOT EXAM  03/13/2024    A1C  08/19/2024    INFLUENZA VACCINE (1) 09/01/2024    COVID-19 Vaccine (6 - 2024-25 season) 09/01/2024    ANNUAL  "REVIEW OF HM ORDERS  10/31/2024    MEDICARE ANNUAL WELLNESS VISIT  10/31/2024    TSH W/FREE T4 REFLEX  10/31/2024    EYE EXAM  11/13/2024    BMP  02/19/2025    LIPID  02/19/2025    MAMMO SCREENING  05/30/2025    COLORECTAL CANCER SCREENING  08/29/2025    FALL RISK ASSESSMENT  11/06/2025    DTAP/TDAP/TD IMMUNIZATION (3 - Td or Tdap) 09/17/2028    ADVANCE CARE PLANNING  10/31/2028    DEXA  06/13/2029    PHQ-2 (once per calendar year)  Completed    Pneumococcal Vaccine: 65+ Years  Completed    HPV IMMUNIZATION  Aged Out    MENINGITIS IMMUNIZATION  Aged Out    RSV MONOCLONAL ANTIBODY  Aged Out         Review of Systems  Constitutional, HEENT, cardiovascular, pulmonary, GI, , musculoskeletal, neuro, skin, endocrine and psych systems are negative, except as otherwise noted.     Objective    Exam  /68   Pulse 64   Temp 97.6  F (36.4  C)   Resp 16   Ht 1.676 m (5' 6\")   Wt 93.4 kg (206 lb)   LMP  (LMP Unknown)   SpO2 96%   Breastfeeding No   BMI 33.25 kg/m     Estimated body mass index is 33.25 kg/m  as calculated from the following:    Height as of this encounter: 1.676 m (5' 6\").    Weight as of this encounter: 93.4 kg (206 lb).    Physical Exam  GENERAL: alert and no distress  EYES: Eyes grossly normal to inspection, PERRL and conjunctivae and sclerae normal  HENT: ear canals and TM's normal, nose and mouth without ulcers or lesions  NECK: no adenopathy, no asymmetry, masses, or scars  RESP: lungs clear to auscultation - no rales, rhonchi or wheezes  CV: regular rate and rhythm, normal S1 S2, no S3 or S4, no murmur, click or rub, no peripheral edema  ABDOMEN: soft, nontender, no hepatosplenomegaly, no masses and bowel sounds normal  MS: no gross musculoskeletal defects noted, no edema  SKIN: no suspicious lesions or rashes  NEURO: Normal strength and tone, mentation intact and speech normal  PSYCH: mentation appears normal, affect normal/bright        11/6/2024   Mini Cog   Clock Draw Score 2 Normal   3 " Item Recall 3 objects recalled   Mini Cog Total Score 5                 Signed Electronically by: ASHLEY Chavez CNP

## 2024-11-11 DIAGNOSIS — E11.9 TYPE 2 DIABETES MELLITUS WITHOUT COMPLICATION, WITHOUT LONG-TERM CURRENT USE OF INSULIN (H): Primary | ICD-10-CM

## 2024-11-14 DIAGNOSIS — E11.9 TYPE 2 DIABETES MELLITUS WITHOUT COMPLICATION, WITHOUT LONG-TERM CURRENT USE OF INSULIN (H): Primary | ICD-10-CM

## 2024-11-14 RX ORDER — GLIPIZIDE 5 MG/1
5 TABLET, FILM COATED, EXTENDED RELEASE ORAL DAILY
Qty: 90 TABLET | Refills: 0 | Status: SHIPPED | OUTPATIENT
Start: 2024-11-14

## 2024-12-05 ENCOUNTER — ANCILLARY PROCEDURE (OUTPATIENT)
Dept: MAMMOGRAPHY | Facility: CLINIC | Age: 69
End: 2024-12-05
Attending: NURSE PRACTITIONER
Payer: COMMERCIAL

## 2024-12-05 DIAGNOSIS — Z12.31 VISIT FOR SCREENING MAMMOGRAM: ICD-10-CM

## 2024-12-12 ENCOUNTER — OFFICE VISIT (OUTPATIENT)
Dept: PODIATRY | Facility: CLINIC | Age: 69
End: 2024-12-12
Payer: COMMERCIAL

## 2024-12-12 VITALS
DIASTOLIC BLOOD PRESSURE: 74 MMHG | BODY MASS INDEX: 33.25 KG/M2 | HEART RATE: 78 BPM | SYSTOLIC BLOOD PRESSURE: 120 MMHG | WEIGHT: 206 LBS

## 2024-12-12 DIAGNOSIS — M19.071 ARTHRITIS OF FOOT, RIGHT: Primary | ICD-10-CM

## 2024-12-12 NOTE — LETTER
12/12/2024      Korin Sharma  [address]  [facility] [address]      Dear Colleague,    Thank you for referring your patient, Korin Sharma, to the [facility]. Please see a copy of my visit note below.    S:  Complains of foot pain.  Points to dorsum of right tarsal bones.  Patient has had this for many years.  Sometimes its flares up and sore.  Some days not really bothersome.  Has history of injury here in her 20s.  Has bump on the top of her foot as to where the pain is.  Denies erythema ecchymosis weakness or increased deformity.  She also has diabetes.  Started having numbness on feet over the last few months.  She states at times it is difficult to control her blood sugars.  History of smoking.    ROS:  see above     No Known Allergies    Current Outpatient Medications   Medication Sig Dispense Refill     aspirin 81 MG EC tablet [ASPIRIN 81 MG EC TABLET] Take 81 mg by mouth daily.       blood glucose (ACCU-CHEK RENATE PLUS) test strip Use to test blood sugar once daily or as directed. 100 strip 3     blood-glucose meter Misc [BLOOD-GLUCOSE METER MISC] Use 1 Device As Directed daily. 1 each 0     cholecalciferol, vitamin D3, 50 mcg (2,000 unit) Tab [CHOLECALCIFEROL, VITAMIN D3, 50 MCG (2,000 UNIT) TAB] Take 2,000 Units by mouth daily.       generic lancets (FINGERSTIX LANCETS) [GENERIC LANCETS (FINGERSTIX LANCETS)] Dispense brand per patient's insurance at pharmacy discretion. 100 each 3     glipiZIDE (GLUCOTROL XL) 5 MG 24 hr tablet Take 1 tablet (5 mg) by mouth daily. 90 tablet 0     levothyroxine (SYNTHROID/LEVOTHROID) 175 MCG tablet Take 1 tablet (175 mcg) by mouth daily. 90 tablet 3     lisinopril-hydrochlorothiazide (ZESTORETIC) 10-12.5 MG tablet Take 1 tablet by mouth daily. 90 tablet 3     magnesium 250 MG tablet Take 1 tablet by mouth at bedtime       metFORMIN (GLUCOPHAGE) 500 MG tablet Take 2 tablets (1,000 mg) by mouth 2 times daily (with meals). 400 tablet 0      multivitamin, therapeutic (THERA-VIT) TABS tablet Take 1 tablet by mouth daily       rosuvastatin (CRESTOR) 10 MG tablet Take 1 tablet (10 mg) by mouth daily. 90 tablet 3       Patient Active Problem List   Diagnosis     Obesity     Hypothyroidism     Cholelithiasis     Acute Pancreatitis     Menopause Has Occurred     Vitamin D Deficiency     Diabetes mellitus (H)     Benign essential hypertension     Hypertriglyceridemia     Prediabetes       No past medical history on file.    Past Surgical History:   Procedure Laterality Date     C SHLDR ARTHROSCOP,DIAGNOSTIC      Description: Arthroscopy Shoulder Right;  Recorded: 08/15/2007;  Comments: w/decompression     CHOLECYSTECTOMY       HC KNEE SCOPE, DIAGNOSTIC      Description: Arthroscopy Knee Left;  Recorded: 08/15/2007;     HC KNEE SCOPE, DIAGNOSTIC      Description: Arthroscopy Knee Left;  Proc Date: 08/01/2007;     HC REDUCTION OF LARGE BREAST      Description: Breast Surgery Reduction Procedure Bilateral;  Recorded: 08/15/2007;     MAMMOPLASTY REDUCTION Bilateral Around 2008     AL STAPEDECTOMY      Description: Stapedectomy;  Recorded: 08/15/2007;  Comments: assoc hearing loss---uses aid     ZZC APPENDECTOMY,RUPT APPENDX+ABSCESS      Description: Appendectomy For Ruptured Appendix;  Proc Date: 10/01/2005;       Family History   Problem Relation Age of Onset     Heart Disease Mother      Diabetes Mother      Diabetes Father      Hypertension Father      Atrial fibrillation Father        Social History     Tobacco Use     Smoking status: Former     Current packs/day: 1.00     Average packs/day: 1 pack/day for 13.0 years (13.0 ttl pk-yrs)     Types: Cigarettes     Passive exposure: Past     Smokeless tobacco: Never     Tobacco comments:     quit in 1983   Substance Use Topics     Alcohol use: Not Currently     Comment: rare         O: /74   Pulse 78   Wt 93.4 kg (206 lb)   LMP  (LMP Unknown)   BMI 33.25 kg/m  .      Constitutional/ general:  Pt is in no  apparent distress, appears well-nourished.  Cooperative with history and physical exam.     Psych:  The patient answered questions appropriately.  Normal affect.  Seems to have reasonable expectations, in terms of treatment.     Lungs:  Non labored breathing, non labored speech. No cough.  No audible wheezing. Even, quiet breathing.       Vascular:  positive pedal pulses bilaterally.  CFT < 3 sec.  positive ankle edema.  positive pedal hair growth.    Neuro:  Alert and oriented x 3. Coordinated gait.  Light touch sensation is diminished on toes.  Monofilament intact.    Derm: Normal texture and turgor.  No erythema, ecchymosis, or cyanosis.      Musculoskeletal: Somewhat pronated arch with weightbearing.  No forefoot or rear foot deformities noted.  MS 5/5 all compartments.  Normal ROM all fore foot and rearfoot joints.  No equinus.  Pain with palpation dorsum of MCP joint.  Bossing noted.  Slight edema.    Xrays show decreased joint and subchondral sclerosis of the second tarsometatarsal joint, naviculocuneiform joint, calcaneocuboid joint, first MTPJ..  There is dorsal bossing.                 Component  Ref Range & Units 1 mo ago  (11/6/24) 9 mo ago  (2/19/24) 1 yr ago  (10/31/23) 1 yr ago  (3/13/23) 2 yr ago  (6/3/22) 2 yr ago  (12/27/21) 3 yr ago  (5/20/21)    Estimated Average Glucose  <117 mg/dL 177 High           Hemoglobin A1C  0.0 - 5.6 % 7.8 High  7.0 High  CM 8.3 High  CM 8.7 High  7.2 High  CM 6.8 High  CM 6.9 High  R            A:    Diabetes mellitus with peripheral neuropathy  Right foot arthritis    P:  X-rays taken today of right foot.  Reviewed recent labs.  Discussed numbness in toes probably from diabetes.  Instructed patient to try to control blood sugars as well as possible in hopes of resolving this.  Protective sensation still intact.  Reviewed x-ray with patient.  Discussed cause of tarsal arthritis.  Stiff shoes at all times both inside and outside and made suggestions.  Avoid activities  that bother this discussed will bother this.  Discussed arch support.  Made suggestions on over-the-counter arch supports.  Also discussed orthotics.  She will call if she would like this.  We use Voltaren gel as needed.  Also discussed physical therapy.  This could be helpful for foot arthritis.  Will call if she would like to do this.  RTC as needed.    Tom Carrillo DPM, FACFAS          Again, thank you for allowing me to participate in the care of your patient.        Sincerely,        Tom Carrillo DPM

## 2024-12-12 NOTE — PROGRESS NOTES
S:  Complains of foot pain.  Points to dorsum of right tarsal bones.  Patient has had this for many years.  Sometimes its flares up and sore.  Some days not really bothersome.  Has history of injury here in her 20s.  Has bump on the top of her foot as to where the pain is.  Denies erythema ecchymosis weakness or increased deformity.  She also has diabetes.  Started having numbness on feet over the last few months.  She states at times it is difficult to control her blood sugars.  History of smoking.    ROS:  see above     No Known Allergies    Current Outpatient Medications   Medication Sig Dispense Refill    aspirin 81 MG EC tablet [ASPIRIN 81 MG EC TABLET] Take 81 mg by mouth daily.      blood glucose (ACCU-CHEK RENATE PLUS) test strip Use to test blood sugar once daily or as directed. 100 strip 3    blood-glucose meter Misc [BLOOD-GLUCOSE METER MISC] Use 1 Device As Directed daily. 1 each 0    cholecalciferol, vitamin D3, 50 mcg (2,000 unit) Tab [CHOLECALCIFEROL, VITAMIN D3, 50 MCG (2,000 UNIT) TAB] Take 2,000 Units by mouth daily.      generic lancets (FINGERSTIX LANCETS) [GENERIC LANCETS (FINGERSTIX LANCETS)] Dispense brand per patient's insurance at pharmacy discretion. 100 each 3    glipiZIDE (GLUCOTROL XL) 5 MG 24 hr tablet Take 1 tablet (5 mg) by mouth daily. 90 tablet 0    levothyroxine (SYNTHROID/LEVOTHROID) 175 MCG tablet Take 1 tablet (175 mcg) by mouth daily. 90 tablet 3    lisinopril-hydrochlorothiazide (ZESTORETIC) 10-12.5 MG tablet Take 1 tablet by mouth daily. 90 tablet 3    magnesium 250 MG tablet Take 1 tablet by mouth at bedtime      metFORMIN (GLUCOPHAGE) 500 MG tablet Take 2 tablets (1,000 mg) by mouth 2 times daily (with meals). 400 tablet 0    multivitamin, therapeutic (THERA-VIT) TABS tablet Take 1 tablet by mouth daily      rosuvastatin (CRESTOR) 10 MG tablet Take 1 tablet (10 mg) by mouth daily. 90 tablet 3       Patient Active Problem List   Diagnosis    Obesity    Hypothyroidism     Cholelithiasis    Acute Pancreatitis    Menopause Has Occurred    Vitamin D Deficiency    Diabetes mellitus (H)    Benign essential hypertension    Hypertriglyceridemia    Prediabetes       No past medical history on file.    Past Surgical History:   Procedure Laterality Date    C SHLDR ARTHROSCOP,DIAGNOSTIC      Description: Arthroscopy Shoulder Right;  Recorded: 08/15/2007;  Comments: w/decompression    CHOLECYSTECTOMY      HC KNEE SCOPE, DIAGNOSTIC      Description: Arthroscopy Knee Left;  Recorded: 08/15/2007;    HC KNEE SCOPE, DIAGNOSTIC      Description: Arthroscopy Knee Left;  Proc Date: 08/01/2007;    HC REDUCTION OF LARGE BREAST      Description: Breast Surgery Reduction Procedure Bilateral;  Recorded: 08/15/2007;    MAMMOPLASTY REDUCTION Bilateral Around 2008    MD STAPEDECTOMY      Description: Stapedectomy;  Recorded: 08/15/2007;  Comments: assoc hearing loss---uses aid    ZZC APPENDECTOMY,RUPT APPENDX+ABSCESS      Description: Appendectomy For Ruptured Appendix;  Proc Date: 10/01/2005;       Family History   Problem Relation Age of Onset    Heart Disease Mother     Diabetes Mother     Diabetes Father     Hypertension Father     Atrial fibrillation Father        Social History     Tobacco Use    Smoking status: Former     Current packs/day: 1.00     Average packs/day: 1 pack/day for 13.0 years (13.0 ttl pk-yrs)     Types: Cigarettes     Passive exposure: Past    Smokeless tobacco: Never    Tobacco comments:     quit in 1983   Substance Use Topics    Alcohol use: Not Currently     Comment: rare         O: /74   Pulse 78   Wt 93.4 kg (206 lb)   LMP  (LMP Unknown)   BMI 33.25 kg/m  .      Constitutional/ general:  Pt is in no apparent distress, appears well-nourished.  Cooperative with history and physical exam.     Psych:  The patient answered questions appropriately.  Normal affect.  Seems to have reasonable expectations, in terms of treatment.     Lungs:  Non labored breathing, non labored  speech. No cough.  No audible wheezing. Even, quiet breathing.       Vascular:  positive pedal pulses bilaterally.  CFT < 3 sec.  positive ankle edema.  positive pedal hair growth.    Neuro:  Alert and oriented x 3. Coordinated gait.  Light touch sensation is diminished on toes.  Monofilament intact.    Derm: Normal texture and turgor.  No erythema, ecchymosis, or cyanosis.      Musculoskeletal: Somewhat pronated arch with weightbearing.  No forefoot or rear foot deformities noted.  MS 5/5 all compartments.  Normal ROM all fore foot and rearfoot joints.  No equinus.  Pain with palpation dorsum of MCP joint.  Bossing noted.  Slight edema.    Xrays show decreased joint and subchondral sclerosis of the second tarsometatarsal joint, naviculocuneiform joint, calcaneocuboid joint, first MTPJ..  There is dorsal bossing.                 Component  Ref Range & Units 1 mo ago  (11/6/24) 9 mo ago  (2/19/24) 1 yr ago  (10/31/23) 1 yr ago  (3/13/23) 2 yr ago  (6/3/22) 2 yr ago  (12/27/21) 3 yr ago  (5/20/21)    Estimated Average Glucose  <117 mg/dL 177 High           Hemoglobin A1C  0.0 - 5.6 % 7.8 High  7.0 High  CM 8.3 High  CM 8.7 High  7.2 High  CM 6.8 High  CM 6.9 High  R            A:    Diabetes mellitus with peripheral neuropathy  Right foot arthritis    P:  X-rays taken today of right foot.  Reviewed recent labs.  Discussed numbness in toes probably from diabetes.  Instructed patient to try to control blood sugars as well as possible in hopes of resolving this.  Protective sensation still intact.  Reviewed x-ray with patient.  Discussed cause of tarsal arthritis.  Stiff shoes at all times both inside and outside and made suggestions.  Avoid activities that bother this discussed will bother this.  Discussed arch support.  Made suggestions on over-the-counter arch supports.  Also discussed orthotics.  She will call if she would like this.  We use Voltaren gel as needed.  Also discussed physical therapy.  This could be  helpful for foot arthritis.  Will call if she would like to do this.  RTC as needed.    Tom Carrillo DPM, FACFAS

## 2024-12-12 NOTE — PATIENT INSTRUCTIONS
We wish you continued good healing. If you have any questions or concerns, please do not hesitate to contact us at  [phone]    [email or web address] (secure e-mail communication and access to your chart) to send a message or to make an appointment.    Please remember to call and schedule a follow up appointment if one was recommended at your earliest convenience.     PODIATRY CLINIC HOURS  TELEPHONE NUMBER    Dr. Tom BRADENPADEN FACFAS        Clinics:  [facility]   [place]  Tuesday 1PM-6PM  [place]  Wednesday 745AM-330PM  [place]  Monday 2nd,4th  830AM-4PM  Thursday/Friday 745AM-230PM     [place] APPOINTMENTS  [phone]    [place] APPOINTMENTS  [phone]          If you need a medication refill, please contact us you may need lab work and/or a follow up visit prior to your refill (i.e. Antifungal medications).  If MRI needed please call Imaging at [phone]   HOW DO I GET MY KNEE SCOOTER? Knee scooters can be picked up at ANY Medical Supply stores with your knee scooter Prescription.  OR  Bring your signed prescription to an [facility] showroom.   Set up an appointment for your custom Orthotics. Call any Orthotics locations call [phone]

## 2024-12-31 ENCOUNTER — ANCILLARY PROCEDURE (OUTPATIENT)
Dept: BONE DENSITY | Facility: CLINIC | Age: 69
End: 2024-12-31
Attending: NURSE PRACTITIONER
Payer: COMMERCIAL

## 2024-12-31 DIAGNOSIS — Z78.0 POSTMENOPAUSAL STATUS: ICD-10-CM

## 2024-12-31 PROCEDURE — 77091 TBS TECHL CALCULATION ONLY: CPT | Performed by: PHYSICIAN ASSISTANT

## 2024-12-31 PROCEDURE — 77080 DXA BONE DENSITY AXIAL: CPT | Mod: TC | Performed by: PHYSICIAN ASSISTANT

## 2025-01-27 RX ORDER — TIZANIDINE 2 MG/1
TABLET ORAL
COMMUNITY
Start: 2025-01-17 | End: 2025-01-28

## 2025-01-28 ENCOUNTER — OFFICE VISIT (OUTPATIENT)
Dept: FAMILY MEDICINE | Facility: CLINIC | Age: 70
End: 2025-01-28
Payer: OTHER MISCELLANEOUS

## 2025-01-28 VITALS
HEIGHT: 66 IN | WEIGHT: 223 LBS | HEART RATE: 70 BPM | SYSTOLIC BLOOD PRESSURE: 130 MMHG | TEMPERATURE: 97.2 F | RESPIRATION RATE: 18 BRPM | OXYGEN SATURATION: 97 % | BODY MASS INDEX: 35.84 KG/M2 | DIASTOLIC BLOOD PRESSURE: 78 MMHG

## 2025-01-28 DIAGNOSIS — M54.50 LUMBAR PAIN: Primary | ICD-10-CM

## 2025-01-28 PROCEDURE — 99214 OFFICE O/P EST MOD 30 MIN: CPT | Performed by: NURSE PRACTITIONER

## 2025-01-28 RX ORDER — TIZANIDINE 2 MG/1
2 TABLET ORAL 3 TIMES DAILY PRN
Qty: 30 TABLET | Refills: 1 | Status: SHIPPED | OUTPATIENT
Start: 2025-01-28

## 2025-01-28 ASSESSMENT — PAIN SCALES - GENERAL: PAINLEVEL_OUTOF10: MODERATE PAIN (4)

## 2025-01-28 NOTE — PROGRESS NOTES
Assessment and Plan:     Lumbar pain  Differentials include myofascial pain, bulging or herniated disks.  She will continue tizanidine as needed.  Offered PT, but she declines.  Discussed symptomatic treatment including rest, ice, stretching activities, ibuprofen with food as needed.  Completed work restrictions.  Will continue with current plan for another week.  She will schedule of an appointment on 2/4/2025 to discuss further work restrictions.  She is content with the plan.  - tiZANidine (ZANAFLEX) 2 MG tablet  Dispense: 30 tablet; Refill: 1        Subjective:     Korin is a 69 year old female presenting to the clinic for a Workmen's Comp. injury.  Patient works for [employer].  She was lifting a trailer off of a ball on 1/16/2025 when she felt muscle tightening within her low back.  Patient was seen on 1/1724 where she received work restrictions.  She is unable to lift greater than 30 pounds.  Patient has been unable to work due to this.  She is taking tizanidine as needed.  This has been providing assistance.  She rates her pain a 4-5/10.  She complains of a constant burning sensation within her lower lumbar region bilaterally.  She has a sharp sensation with various twisting motions.  She denies radicular symptoms.  She has not had numbness and tingling of her extremities.  She denies any urinary or fecal incontinence or retention.  She has been stretching.  She denies any injury in the past.    Reviewof Systems: A complete 14 point review of systems was obtained and is negative or as stated in the history of present illness.    Social History     Socioeconomic History    Marital status: Single     Spouse name: Not on file    Number of children: Not on file    Years of education: Not on file    Highest education level: Not on file   Occupational History    Not on file   Tobacco Use    Smoking status: Former     Current packs/day: 1.00     Average packs/day: 1 pack/day for 13.0 years (13.0 ttl  pk-yrs)     Types: Cigarettes     Passive exposure: Past    Smokeless tobacco: Never    Tobacco comments:     quit in 1983   Vaping Use    Vaping status: Never Used   Substance and Sexual Activity    Alcohol use: Not Currently     Comment: rare    Drug use: No    Sexual activity: Not on file   Other Topics Concern    Not on file   Social History Narrative    Not on file     Social Drivers of Health     Financial Resource Strain: Low Risk  (11/6/2024)    Financial Resource Strain     Within the past 12 months, have you or your family members you live with been unable to get utilities (heat, electricity) when it was really needed?: No   Food Insecurity: Low Risk  (11/6/2024)    Food Insecurity     Within the past 12 months, did you worry that your food would run out before you got money to buy more?: No     Within the past 12 months, did the food you bought just not last and you didn t have money to get more?: No   Transportation Needs: Low Risk  (11/6/2024)    Transportation Needs     Within the past 12 months, has lack of transportation kept you from medical appointments, getting your medicines, non-medical meetings or appointments, work, or from getting things that you need?: No   Physical Activity: Unknown (11/6/2024)    Exercise Vital Sign     Days of Exercise per Week: 1 day     Minutes of Exercise per Session: Not on file   Stress: No Stress Concern Present (11/6/2024)    [employer] [employer] - Occupational Stress Questionnaire     Feeling of Stress : Only a little   Social Connections: Unknown (11/6/2024)    Social Connection and Isolation Panel [NHANES]     Frequency of Communication with Friends and Family: Not on file     Frequency of Social Gatherings with Friends and Family: Once a week     Attends Oriental orthodox Services: Not on file     Active Member of Clubs or Organizations: Not on file     Attends Club or Organization Meetings: Not on file     Marital Status: Not on file  "  Interpersonal Safety: Low Risk  (11/6/2024)    Interpersonal Safety     Do you feel physically and emotionally safe where you currently live?: Yes     Within the past 12 months, have you been hit, slapped, kicked or otherwise physically hurt by someone?: No     Within the past 12 months, have you been humiliated or emotionally abused in other ways by your partner or ex-partner?: No   Housing Stability: Low Risk  (11/6/2024)    Housing Stability     Do you have housing? : Yes     Are you worried about losing your housing?: No       Active Ambulatory Problems     Diagnosis Date Noted    Obesity     Hypothyroidism     Cholelithiasis     Acute Pancreatitis     Menopause Has Occurred     Vitamin D Deficiency     Diabetes mellitus (H) 06/29/2015    Benign essential hypertension 11/15/2018    Hypertriglyceridemia 04/11/2019    Prediabetes 11/06/2023     Resolved Ambulatory Problems     Diagnosis Date Noted    Obesity (BMI 35.0-39.9) with comorbidity (H) 09/17/2018     No Additional Past Medical History       Family History   Problem Relation Age of Onset    Heart Disease Mother     Diabetes Mother     Diabetes Father     Hypertension Father     Atrial fibrillation Father        Objective:     /78   Pulse 70   Temp 97.2  F (36.2  C)   Resp 18   Ht 1.676 m (5' 6\")   Wt 101.2 kg (223 lb)   LMP  (LMP Unknown)   SpO2 97%   Breastfeeding No   BMI 35.99 kg/m      Patient is alert, in no obvious distress.   Skin: Warm, dry.    Lungs:  Clear to auscultation. Respirations even and unlabored.  No wheezing or rales noted.   Heart:  Regular rate and rhythm.  No murmurs.   Musculoskeletal:  Full ROM of extremities.  She is able to heel and toe walk without difficulty.  Straight leg raise is negative bilaterally.  Patellar and Achilles DTRs +2, symmetrical.  She is tender to palpation of her bilateral lower lumbar paraspinous muscles.          Answers submitted by the patient for this visit:  Back Pain Visit " Questionnaire (Submitted on 1/26/2025)  Your back pain is: new  New Back Pain Visit Questionnaire  (Submitted on 1/26/2025)  What do you think is the original cause of your back pain?: lifting  When did you first notice your back pain? : 1-4 weeks ago  How would you describe your back pain? : burning, dull ache  How often do you feel your back pain? : constantly  Where is your back pain located? : left lower back  Where does your back pain spread? : nowhere  Since you noticed your back pain, how has it changed? : gradually improving  Does your back pain interfere with your job?: Yes  On a scale of 1-10 (10 being the worst), how strong is your back pain?: 3  What makes your back pain worse? : bending, certain positions, lying down, sitting, standing  Acupuncture:: not tried  Acetaminophen: not helpful  Activity or Exercise: helpful  Chiropractor: not tried  Cold: helpful  Heat: helpful  Massage: helpful  Muscle relaxants : helpful  NSAIDS (Ibuprofen, Naproxen) : helpful  Opioids: not tried  Physical Therapy: helpful  Rest: helpful  Steroid Injection: not tried  Stretching : helpful  Surgery: not tried  TENS Unit: not tried  Topical pain relievers : helpful  Do you see any other healthcare providers for your back pain? : None  General Questionnaire (Submitted on 1/26/2025)  Chief Complaint: Chronic problems general questions HPI Form  How many days per week do you miss taking your medication?: 0  Questionnaire about: Chronic problems general questions HPI Form (Submitted on 1/26/2025)  Chief Complaint: Chronic problems general questions HPI Form

## 2025-02-04 ENCOUNTER — OFFICE VISIT (OUTPATIENT)
Dept: FAMILY MEDICINE | Facility: CLINIC | Age: 70
End: 2025-02-04
Payer: OTHER MISCELLANEOUS

## 2025-02-04 VITALS
BODY MASS INDEX: 36.32 KG/M2 | RESPIRATION RATE: 18 BRPM | HEART RATE: 61 BPM | HEIGHT: 66 IN | TEMPERATURE: 98 F | WEIGHT: 226 LBS | OXYGEN SATURATION: 97 % | SYSTOLIC BLOOD PRESSURE: 120 MMHG | DIASTOLIC BLOOD PRESSURE: 76 MMHG

## 2025-02-04 DIAGNOSIS — M54.50 LUMBAR PAIN: Primary | ICD-10-CM

## 2025-02-04 PROCEDURE — 99213 OFFICE O/P EST LOW 20 MIN: CPT | Performed by: NURSE PRACTITIONER

## 2025-02-04 ASSESSMENT — PAIN SCALES - GENERAL: PAINLEVEL_OUTOF10: MILD PAIN (2)

## 2025-02-04 NOTE — PROGRESS NOTES
Assessment and Plan:     Lumbar pain  Differentials include myofascial pain, bulging herniated disks.  She continues tizanidine and ibuprofen as needed.  She continues to experience pain with lifting.  Will refer to physical therapy and spine clinic for further evaluation.  Provided work restrictions through 2/18/2025.  She is content with the plan.  - Physical Therapy  Referral  - Spine  Referral        Subjective:     Korin is a 69 year old female presenting to the clinic for follow-up on lumbar pain. Patient works for [employer].  She was lifting a trailer off of a ball on 1/16/2025 when she felt muscle tightening within her low back.  Patient was seen on 1/1724 where she received work restrictions.  Patient was seen in clinic on 1/28/2025 where we treated her pain with tizanidine.  Completed work restrictions preventing her from lifting greater than 30 pounds.  Patient presents today stating her pain is improved, but continues to be a constant dull ache which is radiating across her lower lumbar region.  She feels as though her mobility is increasing.  She can  her 10 pound dog, but cannot  her 20 pound dog.  She experiences significant pain when she lifts her heavy her dog.  She has been suffered from slight constipation and had a bowel movement yesterday.  She is finding some relief tizanidine and ibuprofen.  She has been performing light massages    Reviewof Systems: A complete 14 point review of systems was obtained and is negative or as stated in the history of present illness.    Social History     Socioeconomic History    Marital status: Single     Spouse name: Not on file    Number of children: Not on file    Years of education: Not on file    Highest education level: Not on file   Occupational History    Not on file   Tobacco Use    Smoking status: Former     Current packs/day: 1.00     Average packs/day: 1 pack/day for 13.0 years (13.0 ttl pk-yrs)     Types:  Cigarettes     Passive exposure: Past    Smokeless tobacco: Never    Tobacco comments:     quit in 1983   Vaping Use    Vaping status: Never Used   Substance and Sexual Activity    Alcohol use: Not Currently     Comment: rare    Drug use: No    Sexual activity: Not on file   Other Topics Concern    Not on file   Social History Narrative    Not on file     Social Drivers of Health     Financial Resource Strain: Low Risk  (11/6/2024)    Financial Resource Strain     Within the past 12 months, have you or your family members you live with been unable to get utilities (heat, electricity) when it was really needed?: No   Food Insecurity: Low Risk  (11/6/2024)    Food Insecurity     Within the past 12 months, did you worry that your food would run out before you got money to buy more?: No     Within the past 12 months, did the food you bought just not last and you didn t have money to get more?: No   Transportation Needs: Low Risk  (11/6/2024)    Transportation Needs     Within the past 12 months, has lack of transportation kept you from medical appointments, getting your medicines, non-medical meetings or appointments, work, or from getting things that you need?: No   Physical Activity: Unknown (11/6/2024)    Exercise Vital Sign     Days of Exercise per Week: 1 day     Minutes of Exercise per Session: Not on file   Stress: No Stress Concern Present (11/6/2024)    [employer] [place] [employer]ational [employer] - Occupational Stress Questionnaire     Feeling of Stress : Only a little   Social Connections: Unknown (11/6/2024)    Social Connection and Isolation Panel [NHANES]     Frequency of Communication with Friends and Family: Not on file     Frequency of Social Gatherings with Friends and Family: Once a week     Attends Jehovah's witness Services: Not on file     Active Member of Clubs or Organizations: Not on file     Attends Club or Organization Meetings: Not on file     Marital Status: Not on file   Interpersonal Safety: Low Risk   "(11/6/2024)    Interpersonal Safety     Do you feel physically and emotionally safe where you currently live?: Yes     Within the past 12 months, have you been hit, slapped, kicked or otherwise physically hurt by someone?: No     Within the past 12 months, have you been humiliated or emotionally abused in other ways by your partner or ex-partner?: No   Housing Stability: Low Risk  (11/6/2024)    Housing Stability     Do you have housing? : Yes     Are you worried about losing your housing?: No       Active Ambulatory Problems     Diagnosis Date Noted    Obesity     Hypothyroidism     Cholelithiasis     Acute Pancreatitis     Menopause Has Occurred     Vitamin D Deficiency     Diabetes mellitus (H) 06/29/2015    Benign essential hypertension 11/15/2018    Hypertriglyceridemia 04/11/2019    Prediabetes 11/06/2023     Resolved Ambulatory Problems     Diagnosis Date Noted    Obesity (BMI 35.0-39.9) with comorbidity (H) 09/17/2018     No Additional Past Medical History       Family History   Problem Relation Age of Onset    Heart Disease Mother     Diabetes Mother     Diabetes Father     Hypertension Father     Atrial fibrillation Father        Objective:     /76   Pulse 61   Temp 98  F (36.7  C)   Resp 18   Ht 1.676 m (5' 6\")   Wt 102.5 kg (226 lb)   LMP  (LMP Unknown)   SpO2 97%   BMI 36.48 kg/m      Patient is alert, in no obvious distress.   Skin: Warm, dry.   Lungs:  Clear to auscultation. Respirations even and unlabored.  No wheezing or rales noted.   Heart:  Regular rate and rhythm.  No murmurs, S3, S4, gallops, or rubs.    Musculoskeletal:  Full ROM of extremities.  She is tender to palpation of her bilateral lower lumbar paraspinous muscles.  She is able to heel and toe walk without difficulty.  Patellar and Achilles DTRs +2, symmetrical.  Straight leg raise is negative bilaterally.  She is able to heel and toe walk without difficulty.    Answers submitted by the patient for this visit:  Back Pain " Visit Questionnaire (Submitted on 1/26/2025)  Your back pain is: new  New Back Pain Visit Questionnaire  (Submitted on 1/26/2025)  What do you think is the original cause of your back pain?: lifting  When did you first notice your back pain? : 1-4 weeks ago  How would you describe your back pain? : burning, dull ache  How often do you feel your back pain? : constantly  Where is your back pain located? : left lower back  Where does your back pain spread? : nowhere  Since you noticed your back pain, how has it changed? : gradually improving  Does your back pain interfere with your job?: Yes  On a scale of 1-10 (10 being the worst), how strong is your back pain?: 3  What makes your back pain worse? : bending, certain positions, lying down, sitting, standing  Acupuncture:: not tried  Acetaminophen: not helpful  Activity or Exercise: helpful  Chiropractor: not tried  Cold: helpful  Heat: helpful  Massage: helpful  Muscle relaxants : helpful  NSAIDS (Ibuprofen, Naproxen) : helpful  Opioids: not tried  Physical Therapy: helpful  Rest: helpful  Steroid Injection: not tried  Stretching : helpful  Surgery: not tried  TENS Unit: not tried  Topical pain relievers : helpful  Do you see any other healthcare providers for your back pain? : None  General Questionnaire (Submitted on 1/26/2025)  Chief Complaint: Chronic problems general questions HPI Form  How many days per week do you miss taking your medication?: 0  Questionnaire about: Chronic problems general questions HPI Form (Submitted on 1/26/2025)  Chief Complaint: Chronic problems general questions HPI Form

## 2025-02-06 ENCOUNTER — THERAPY VISIT (OUTPATIENT)
Dept: PHYSICAL THERAPY | Facility: CLINIC | Age: 70
End: 2025-02-06
Attending: NURSE PRACTITIONER
Payer: OTHER MISCELLANEOUS

## 2025-02-06 DIAGNOSIS — M54.50 LUMBAR PAIN: ICD-10-CM

## 2025-02-06 NOTE — PROGRESS NOTES
PHYSICAL THERAPY EVALUATION  Type of Visit: Evaluation    {Disappearing TIP  Adult Abuse Screen not completed in this Encounter. Please complete screening!:108012}    Fall Risk Screen:{TIP  If fall risk screening needs updating, please click link.:339676}  Fall screen completed by: PT  Have you fallen 2 or more times in the past year?: No  Have you fallen and had an injury in the past year?: No  Is patient a fall risk?: No    Subjective Was lifting a trailer off the hitch on 1/16/2025 and hurt back. Gets pain in left low back, not into buttock. Initially it had been across the entire low back. 30-60 minutes of standing, walking will increase pain. Lifting 20# dog will hurt. Able to get off the floor now. Motion has been improving. Bending and twisting can be uncomfortable. Ibuprofen, muscle relaxants, lidocaine has been helpful. Has a 30# lifting restriction at work. 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     {Disappearing TIP  Health History pop-up / flowsheet :381912}   Presenting condition or subjective complaint:    Date of onset:   {Disappearing TIP  Date of onset/injury :505793}   Relevant medical history:     Dates & types of surgery:      Prior diagnostic imaging/testing results:       Prior therapy history for the same diagnosis, illness or injury:        Prior Level of Function  Transfers: Independent  Ambulation: Independent  ADL: Independent    Living Environment  Social support:     Type of home:     Stairs to enter the home:         Ramp:     Stairs inside the home:         Help at home:    Equipment owned:       Employment:      Hobbies/Interests:      Patient goals for therapy:       Objective   LUMBAR SPINE EVALUATION  PAIN: Pain Level at Rest: 2/10  Pain Level with Use: 10/10  POSTURE: Sitting Posture: Rounded shoulders, Forward head  GAIT:   Weightbearing Status: WBAT  Assistive Device(s): None  Gait Deviations: WNL  WEIGHTBEARING ALIGNMENT: {CARLOS PT WB Alignment  (Optional):829743}  NON-WEIGHTBEARING ALIGNMENT: {CARLOS PT NWB Alignment (Optional):618700}   ROM:   (Degrees) Left AROM Left PROM  Right AROM Right PROM   Hip Flexion       Hip Extension       Hip Abduction       Hip Adduction       Hip Internal Rotation       Hip External Rotation       Knee Flexion       Knee Extension       Lumbar Side glide Mildly restricted - Pain at end range Mildly restricted - Pain at end range   Lumbar Flexion To proximal shin - Pain in left lumbar spine   Lumbar Extension Moderately restricted - Pain in left lumbar spine     STRENGTH: Hip abduction - 4-/5 B, Hip extension - 4/5 on right and 4-/5 on left  MYOTOMES: WNL  DTR S:    Left Right   C5 (Biceps) {carlos pt DTR grade (Optional):042496} {carlos pt DTR grade (Optional):876614}   C6 (Brachioradialis) {carlos pt DTR grade (Optional):710254} {carlos pt DTR grade (Optional):561417}   C7 (Triceps) {carlos pt DTR grade (Optional):205173} {carlos pt DTR grade (Optional):252552}   L4 (Quad) 2 2   S1 (Achilles) {carlos pt DTR grade (Optional):970288} {carlos pt DTR grade (Optional):609610}     DERMATOMES: WNL  NEURAL TENSION: Lumbar WNL  FLEXIBILITY: Decreased piriformis L, Decreased hamstrings L  LUMBAR/HIP Special Tests:    Left Right   NEY Negative  {CARLOS PT SPECIAL TESTS (Optional):524505}   FADIR/Labrum/GRACE Positive {CARLOS PT SPECIAL TESTS (Optional):865450}   Femoral Nerve {CARLOS PT SPECIAL TESTS (Optional):327955} {CARLOS PT SPECIAL TESTS (Optional):723460}   Kasey's {CARLOS PT SPECIAL TESTS (Optional):654632} {CARLOS PT SPECIAL TESTS (Optional):142513}   Piriformis {CARLOS PT SPECIAL TESTS (Optional):628380} {CARLOS PT SPECIAL TESTS (Optional):544508}   Quadrant Testing {CARLOS PT SPECIAL TESTS (Optional):205435} {CARLOS PT SPECIAL TESTS (Optional):243639}   SLR {CARLOS PT SPECIAL TESTS (Optional):399292} {CARLOS PT SPECIAL TESTS (Optional):973823}   Slump {CARLOS PT SPECIAL TESTS (Optional):438269} {CARLOS PT SPECIAL TESTS (Optional):814601}   Hetal with Extension {CARLOS PT SPECIAL TESTS  (Optional):909237} {CARLOS PT SPECIAL TESTS (Optional):753350}   Angus {CARLOS PT SPECIAL TESTS (Optional):454965} {CARLOS PT SPECIAL TESTS (Optional):521523}   Hip Scour Positive {CARLOS PT SPECIAL TESTS (Optional):380508}      PELVIS/SI SPECIAL TESTS:    Left Right Additional Notes   ASLR      Gaenslen's Test      Pelvic Compression Negative Negative    Pelvic Gapping Negative Negative    Sacral Thrust Negative Negative    Thigh Thrust Positive Negative      PALPATION: {CARLOS PT Palpation Lumbar (Optional):859530}  SPINAL SEGMENTAL CONCLUSIONS: Hypomobility of T10-L5    Assessment & Plan   CLINICAL IMPRESSIONS  Medical Diagnosis:    {Disappearing TIP  Medical Dx :180328}  Treatment Diagnosis: Lumbar spine pain {Disappearing TIP  Treatment Dx :913473}  Impression/Assessment: {[facility] pt assessment:412220}    Clinical Decision Making (Complexity):  Clinical Presentation: {Presentation:845119}  Clinical Presentation Rationale: based on medical and personal factors listed in PT evaluation  Clinical Decision Making (Complexity): {Complexity:419536}    PLAN OF CARE  Treatment Interventions:  {:543039}    Long Term Goals {Disappearing TIP  Goals :240990}    PT Goal 1  Goal Identifier: Standing  Goal Description: Pt will be able to stand for 20 minutes in order to make a meal without having to sit due to back pain  Target Date: 04/03/25  PT Goal 2  Goal Identifier: Sitting  Goal Description: Pt will be able to sit for 20 minutes in order to drive to work with a minimal increase in pain 1-2/10  Target Date: 04/03/25    {Disapparing TIP  Frequency/Duration :221657}  Frequency of Treatment: 1 time per week  Duration of Treatment: 8 weeks    Recommended Referrals to Other Professionals: {carlos referrals suggested:563947}  Education Assessment: {Disapparing TIP  Patient Education :582270}  Learner/Method: Patient;No Barriers to Learning    Risks and benefits of evaluation/treatment have been explained.   Patient/Family/caregiver agrees with  Plan of Care.     Evaluation Time:   {Disappearing TIP  Eval Minutes :698933}     {EVAL ONLY:804867}     Signing Clinician: Erik Brand PT

## 2025-02-07 PROBLEM — M54.50 LUMBAR SPINE PAIN: Status: ACTIVE | Noted: 2025-02-07

## 2025-02-13 NOTE — PROGRESS NOTES
ASSESSMENT: Korin Sharma is a 69 year old female who presents for consultation at the request of PCP Racquel Dunn, with a past medical history significant for vitamin D deficiency, cholelithiasis, pancreatitis, hypothyroidism, diabetes mellitus, hypertension who presents today for new patient evaluation of:    -Acute midline and left low back pain post lifting injury 1/16/2025 at work    Patient is neurologically intact on exam. No myelopathic or red flag symptoms.         2/6/2025     3:51 PM   OSWESTRY DISABILITY INDEX   Count 9    Sum 15    Oswestry Score (%) 33.33 %        Patient-reported            Diagnoses and all orders for this visit:  Acute left-sided low back pain without sciatica  -     XR Lumbar Spine 2/3 Views; Future  Injury caused by lifting, initial encounter  -     XR Lumbar Spine 2/3 Views; Future  Other orders  -     Spine  Referral    PLAN:  Reviewed spine anatomy and disease process. Discussed diagnosis and treatment options with the patient today. A shared decision making model was used.  The patient's values and choices were respected. The following represents what was discussed and decided upon by the provider and the patient.      -DIAGNOSTIC TESTS:    -- Ordered lumbar spine x-ray to further evaluate  -- Bone density scan 12/31/2024 with normal bone density    -PHYSICAL THERAPY: Discussed with patient if there is no acute fractures I would recommend continuing with physical therapy.  If there is a fracture then I would advise her to hold off on any further therapy.  Patient verbalized understanding.  Discussed the importance of core strengthening, ROM, stretching exercises with the patient and how each of these entities is important in decreasing pain.  Explained to the patient that the purpose of physical therapy is to teach the patient a home exercise program.  These exercises need to be performed every day in order to decrease pain and prevent future occurrences of pain.         -MEDICATIONS: No changes in medications we did discuss trialing a prescription NSAID but she is okay with tizanidine and ibuprofen at this time.  Discussed multiple medication options today with patient. Discussed risks, side effects, and proper use of medications. Patient verbalized understanding.    -INTERVENTIONS: No recommendations for injections.    -Work restrictions given for 10 pound lifting limit, limiting bending and twisting for the next 4 weeks.  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     -FOLLOW-UP:   Follow-up [facility] message for x-ray results    Advised patient to call the [facility] if symptoms worsen or you have problems controlling bladder and bowel function.   ______________________________________________________________________    SUBJECTIVE:  HPI:  Korin Sharma  Is a 69 year old female who presents today for new patient evaluation of low back pain mid lumbar spine that radiates to the left ongoing since lifting injury 1/16/2024.  Her pain initially was very severe and debilitating.  Symptoms have slightly improved.  Today her pain is a 4/10 but it does still get up to a 10/10 and was a 10/10 initially, it is a 2 at its best.  She does report that any type of lifting bending or twisting is aggravating, she is doing very gentle exercises from PT and doing okay with them but is unable to do very many repetitions without aggravation of her pain after she completes exercises.  She denies initial or current leg pain, denies numbness or tingling sensations.  Denies lower extremity weakness.  Denies any recent trips or falls or balance changes.  Denies bowel or bladder loss control.    Patient is typically very active and has had no prior history of back  pain.    *Work injury 1/16/2025.  Patient is a  was putting on a trailer and lifting the hitch to go over the ball, immediately noted severe pain.    -Treatment to Date: No prior spinal surgery or spinal injection  Physical therapy eval 2/6/2025 LBP    -Medications:  Tizanidine    Current Outpatient Medications   Medication Sig Dispense Refill    tiZANidine (ZANAFLEX) 2 MG tablet Take 1 tablet (2 mg) by mouth 3 times daily as needed for muscle spasms. 30 tablet 1    aspirin 81 MG EC tablet [ASPIRIN 81 MG EC TABLET] Take 81 mg by mouth daily. (Patient not taking: Reported on 2/17/2025)      blood glucose (ACCU-CHEK RENATE PLUS) test strip Use to test blood sugar once daily or as directed. 100 strip 3    blood-glucose meter Misc [BLOOD-GLUCOSE METER MISC] Use 1 Device As Directed daily. 1 each 0    cholecalciferol, vitamin D3, 50 mcg (2,000 unit) Tab [CHOLECALCIFEROL, VITAMIN D3, 50 MCG (2,000 UNIT) TAB] Take 2,000 Units by mouth daily.      generic lancets (FINGERSTIX LANCETS) [GENERIC LANCETS (FINGERSTIX LANCETS)] Dispense brand per patient's insurance at pharmacy discretion. 100 each 3    glipiZIDE (GLUCOTROL XL) 5 MG 24 hr tablet Take 1 tablet (5 mg) by mouth daily. 90 tablet 0    levothyroxine (SYNTHROID/LEVOTHROID) 175 MCG tablet Take 1 tablet (175 mcg) by mouth daily. 90 tablet 3    lisinopril-hydrochlorothiazide (ZESTORETIC) 10-12.5 MG tablet Take 1 tablet by mouth daily. 90 tablet 3    magnesium 250 MG tablet Take 1 tablet by mouth at bedtime      metFORMIN (GLUCOPHAGE) 500 MG tablet Take 2 tablets (1,000 mg) by mouth 2 times daily (with meals). 400 tablet 0    multivitamin, therapeutic (THERA-VIT) TABS tablet Take 1 tablet by mouth daily      rosuvastatin (CRESTOR) 10 MG tablet Take 1 tablet (10 mg) by mouth daily. 90 tablet 3     No current facility-administered medications for this visit.       No Known Allergies    No past medical history on file.     Patient Active Problem List   Diagnosis     Obesity    Hypothyroidism    Cholelithiasis    Acute Pancreatitis    Menopause Has Occurred    Vitamin D Deficiency    Diabetes mellitus (H)    Benign essential hypertension    Hypertriglyceridemia    Prediabetes    Lumbar spine pain       Past Surgical History:   Procedure Laterality Date    C SHLDR ARTHROSCOP,DIAGNOSTIC      Description: Arthroscopy Shoulder Right;  Recorded: 08/15/2007;  Comments: w/decompression    CHOLECYSTECTOMY      HC KNEE SCOPE, DIAGNOSTIC      Description: Arthroscopy Knee Left;  Recorded: 08/15/2007;    HC KNEE SCOPE, DIAGNOSTIC      Description: Arthroscopy Knee Left;  Proc Date: 08/01/2007;    HC REDUCTION OF LARGE BREAST      Description: Breast Surgery Reduction Procedure Bilateral;  Recorded: 08/15/2007;    MAMMOPLASTY REDUCTION Bilateral Around 2008    CO STAPEDECTOMY      Description: Stapedectomy;  Recorded: 08/15/2007;  Comments: assoc hearing loss---uses aid    ZZC APPENDECTOMY,RUPT APPENDX+ABSCESS      Description: Appendectomy For Ruptured Appendix;  Proc Date: 10/01/2005;       Family History   Problem Relation Age of Onset    Heart Disease Mother     Diabetes Mother     Diabetes Father     Hypertension Father     Atrial fibrillation Father        Reviewed past medical, surgical, and family history with patient found on new patient intake packet located in EMR Media tab.     SOCIAL HX: Patient works a [employer] .  Patient denies smoking/tobacco use.  Does report drinking alcohol on occasion, denies history being heavy drinker.  Denies recreational drug use.    ROS: Positive for muscle pain, ringing in ears.  Specifically negative for bowel/bladder dysfunction, balance changes, headache, dizziness, foot drop, fevers, chills, appetite changes, nausea/vomiting, unexplained weight loss. Otherwise 13 systems reviewed are negative. Please see the patient's intake questionnaire from today for details.    OBJECTIVE:  BP (!) 141/66 (BP Location: Left arm, Patient Position:  "Sitting, Cuff Size: Adult Regular)   Pulse 76   Ht 5' 6\" (1.676 m)   Wt 226 lb (102.5 kg)   LMP  (LMP Unknown)   BMI 36.48 kg/m      PHYSICAL EXAMINATION:    --CONSTITUTIONAL:  Vital signs as above.  No acute distress.  The patient is well nourished and well groomed.  --PSYCHIATRIC:  Appropriate mood and affect. The patient is awake, alert, oriented to person, place, time and answering questions appropriately with clear speech.    --SKIN:  Skin over the face, bilateral lower extremities, and posterior torso is clean, dry, intact without rashes.    --RESPIRATORY: Normal rhythm and effort. No abnormal accessory muscle breathing patterns noted.   --STANDING EXAMINATION:  Normal lumbar lordosis noted, no lateral shift.  --MUSCULOSKELETAL: Range of motion is slightly limited in lumbar flexion, extension, lateral rotation due to pain.  Tenderness to palpation L2-L3 region midline and, left greater than right.  Straight leg raising is negative to radicular pain. Sciatic notch non-tender.  --GROSS MOTOR: Gait is non-antalgic. Easily arises from a seated position.   --LOWER EXTREMITY MOTOR TESTING:  Plantar flexion left 5/5, right 5/5   Dorsiflexion left 5/5, right 5/5   Great toe MTP extension left 5/5, right 5/5  Knee flexion left 5/5, right 5/5  Knee extension left 5/5, right 5/5   Hip flexion left 5/5, right 5/5  Hip abduction left 5/5, right 5/5  Hip adduction left 5/5, right 5/5   --HIPS: Full range of motion bilaterally. Negative FABERs on the involved lower extremity.   --NEUROLOGICAL:  2/4 patellar, medial hamstring, and achilles reflexes bilaterally.  Sensation to light touch is intact in the bilateral L4, L5, and S1 dermatomes. Babinski is negative. No clonus.  Negative Priscila reflex bilaterally.  --VASCULAR:  Bilateral lower extremities are warm.  There is no pitting edema of the bilateral lower extremities.    RESULTS: Prior medical records from [facility] and [facility] [place] Everywhere were reviewed " today.

## 2025-02-17 ENCOUNTER — TELEPHONE (OUTPATIENT)
Dept: PHYSICAL MEDICINE AND REHAB | Facility: CLINIC | Age: 70
End: 2025-02-17

## 2025-02-17 ENCOUNTER — OFFICE VISIT (OUTPATIENT)
Dept: PHYSICAL MEDICINE AND REHAB | Facility: CLINIC | Age: 70
End: 2025-02-17
Attending: NURSE PRACTITIONER
Payer: OTHER MISCELLANEOUS

## 2025-02-17 ENCOUNTER — HOSPITAL ENCOUNTER (OUTPATIENT)
Dept: GENERAL RADIOLOGY | Facility: HOSPITAL | Age: 70
Discharge: HOME | End: 2025-02-17
Attending: NURSE PRACTITIONER | Admitting: NURSE PRACTITIONER

## 2025-02-17 VITALS
SYSTOLIC BLOOD PRESSURE: 141 MMHG | WEIGHT: 226 LBS | HEART RATE: 76 BPM | BODY MASS INDEX: 36.32 KG/M2 | DIASTOLIC BLOOD PRESSURE: 66 MMHG | HEIGHT: 66 IN
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
## 2025-02-17 DIAGNOSIS — S32.030A CLOSED COMPRESSION FRACTURE OF L3 LUMBAR VERTEBRA, INITIAL ENCOUNTER (H): ICD-10-CM

## 2025-02-17 DIAGNOSIS — M54.50 ACUTE LEFT-SIDED LOW BACK PAIN WITHOUT SCIATICA: Primary | ICD-10-CM

## 2025-02-17 DIAGNOSIS — M54.50 ACUTE LEFT-SIDED LOW BACK PAIN WITHOUT SCIATICA: ICD-10-CM

## 2025-02-17 PROCEDURE — 72100 X-RAY EXAM L-S SPINE 2/3 VWS: CPT

## 2025-02-17 PROCEDURE — 99204 OFFICE O/P NEW MOD 45 MIN: CPT | Performed by: NURSE PRACTITIONER

## 2025-02-17 ASSESSMENT — PAIN SCALES - GENERAL: PAINLEVEL_OUTOF10: MODERATE PAIN (4)

## 2025-02-17 NOTE — TELEPHONE ENCOUNTER
"PSP:  Rosita  Last clinic visit:  2-17-25  Reason for call: Update work note  Clinical information:  Pt was in today and states she will need an \"updated work note\" to keep her out of work for the next 4 weeks, until her follow up. She states \"Rosita will know what I'm talking about.\" She is requesting this be sent through [employer].  Provider to address: Please send updated work note if indicated.   "

## 2025-02-17 NOTE — LETTER
February 17, 2025      Korin Sharma  [address]  [place] [address]        To Whom It May Concern:    Korin Sharma was seen in our clinic. She may return to work with the following: 10 pound lifting limit, limiting bending and twisting as tolerated.  Restrictions for 4 weeks.  Will revisit further workability needs at that time.      Sincerely,      Rosita Waldrop      Electronically signed

## 2025-02-17 NOTE — Clinical Note
2/17/2025      Korin Sharma  [address]  [facility] [address]      Dear Colleague,    Thank you for referring your patient, Korin Sharma, to the [facility]. Please see a copy of my visit note below.    ASSESSMENT: Korin Sharma is a 69 year old female who presents for consultation at the request of PCP Racquel Dunn, with a past medical history significant for vitamin D deficiency, cholelithiasis, pancreatitis, hypothyroidism, diabetes mellitus, hypertension who presents today for new patient evaluation of:    -***    Patient is neurologically intact on exam. No myelopathic or red flag symptoms.         2/6/2025     3:51 PM   OSWESTRY DISABILITY INDEX   Count 9    Sum 15    Oswestry Score (%) 33.33 %        Patient-reported            There are no diagnoses linked to this encounter.  PLAN:  Reviewed spine anatomy and disease process. Discussed diagnosis and treatment options with the patient today. A shared decision making model was used.  The patient's values and choices were respected. The following represents what was discussed and decided upon by the provider and the patient.      -DIAGNOSTIC TESTS:  Images were personally reviewed and interpreted and explained to patient today using spine model.   --***  -- Bone density scan 12/31/2024 with normal bone density    -PHYSICAL THERAPY:  ***    Discussed the importance of core strengthening, ROM, stretching exercises with the patient and how each of these entities is important in decreasing pain.  Explained to the patient that the purpose of physical therapy is to teach the patient a home exercise program.  These exercises need to be performed every day in order to decrease pain and prevent future occurrences of pain.        -MEDICATIONS:  ***    Discussed multiple medication options today with patient. Discussed risks, side effects, and proper use of medications. Patient verbalized understanding.    -INTERVENTIONS:   ***  Discussed risks and benefits of injections with patient today.    -PATIENT EDUCATION:  Total time of *** minutes, on the day of service, spent with the patient, reviewing the chart, placing orders, and documenting.     -FOLLOW-UP:   ***    Advised patient to call the Spine Center if symptoms worsen or you have problems controlling bladder and bowel function.   ______________________________________________________________________    SUBJECTIVE:  HPI:  Korin Sharma  Is a 69 year old female who presents today for new patient evaluation of low back pain ***    -Treatment to Date: ***    -Medications:    Current Outpatient Medications   Medication Sig Dispense Refill    aspirin 81 MG EC tablet [ASPIRIN 81 MG EC TABLET] Take 81 mg by mouth daily.      blood glucose (ACCU-CHEK RENATE PLUS) test strip Use to test blood sugar once daily or as directed. 100 strip 3    blood-glucose meter Misc [BLOOD-GLUCOSE METER MISC] Use 1 Device As Directed daily. 1 each 0    cholecalciferol, vitamin D3, 50 mcg (2,000 unit) Tab [CHOLECALCIFEROL, VITAMIN D3, 50 MCG (2,000 UNIT) TAB] Take 2,000 Units by mouth daily.      generic lancets (FINGERSTIX LANCETS) [GENERIC LANCETS (FINGERSTIX LANCETS)] Dispense brand per patient's insurance at pharmacy discretion. 100 each 3    glipiZIDE (GLUCOTROL XL) 5 MG 24 hr tablet Take 1 tablet (5 mg) by mouth daily. 90 tablet 0    levothyroxine (SYNTHROID/LEVOTHROID) 175 MCG tablet Take 1 tablet (175 mcg) by mouth daily. 90 tablet 3    lisinopril-hydrochlorothiazide (ZESTORETIC) 10-12.5 MG tablet Take 1 tablet by mouth daily. 90 tablet 3    magnesium 250 MG tablet Take 1 tablet by mouth at bedtime      metFORMIN (GLUCOPHAGE) 500 MG tablet Take 2 tablets (1,000 mg) by mouth 2 times daily (with meals). 400 tablet 0    multivitamin, therapeutic (THERA-VIT) TABS tablet Take 1 tablet by mouth daily      rosuvastatin (CRESTOR) 10 MG tablet Take 1 tablet (10 mg) by mouth daily. 90 tablet 3    tiZANidine  (ZANAFLEX) 2 MG tablet Take 1 tablet (2 mg) by mouth 3 times daily as needed for muscle spasms. 30 tablet 1     No current facility-administered medications for this visit.       No Known Allergies    No past medical history on file.     Patient Active Problem List   Diagnosis    Obesity    Hypothyroidism    Cholelithiasis    Acute Pancreatitis    Menopause Has Occurred    Vitamin D Deficiency    Diabetes mellitus (H)    Benign essential hypertension    Hypertriglyceridemia    Prediabetes    Lumbar spine pain       Past Surgical History:   Procedure Laterality Date    C SHLDR ARTHROSCOP,DIAGNOSTIC      Description: Arthroscopy Shoulder Right;  Recorded: 08/15/2007;  Comments: w/decompression    CHOLECYSTECTOMY      HC KNEE SCOPE, DIAGNOSTIC      Description: Arthroscopy Knee Left;  Recorded: 08/15/2007;    HC KNEE SCOPE, DIAGNOSTIC      Description: Arthroscopy Knee Left;  Proc Date: 08/01/2007;    HC REDUCTION OF LARGE BREAST      Description: Breast Surgery Reduction Procedure Bilateral;  Recorded: 08/15/2007;    MAMMOPLASTY REDUCTION Bilateral Around 2008    KS STAPEDECTOMY      Description: Stapedectomy;  Recorded: 08/15/2007;  Comments: assoc hearing loss---uses aid    ZZC APPENDECTOMY,RUPT APPENDX+ABSCESS      Description: Appendectomy For Ruptured Appendix;  Proc Date: 10/01/2005;       Family History   Problem Relation Age of Onset    Heart Disease Mother     Diabetes Mother     Diabetes Father     Hypertension Father     Atrial fibrillation Father        Reviewed past medical, surgical, and family history with patient found on new patient intake packet located in [employer] tab.     SOCIAL HX: ***    ROS: *** Specifically negative for bowel/bladder dysfunction, balance changes, headache, dizziness, foot drop, fevers, chills, appetite changes, nausea/vomiting, unexplained weight loss. Otherwise 13 systems reviewed are negative. Please see the patient's intake questionnaire from today for  details.    OBJECTIVE:  LMP  (LMP Unknown)     PHYSICAL EXAMINATION:    --CONSTITUTIONAL:  Vital signs as above.  No acute distress.  The patient is well nourished and well groomed.  --PSYCHIATRIC:  Appropriate mood and affect. The patient is awake, alert, oriented to person, place, time and answering questions appropriately with clear speech.    --SKIN:  Skin over the face, bilateral lower extremities, and posterior torso is clean, dry, intact without rashes.    --RESPIRATORY: Normal rhythm and effort. No abnormal accessory muscle breathing patterns noted.   --STANDING EXAMINATION:  Normal lumbar lordosis noted, no lateral shift.  --MUSCULOSKELETAL: Range of motion is not limited in lumbar flexion, extension, lateral rotation. No tenderness to palpation lumbar spine. Straight leg raising is negative to radicular pain. Sciatic notch non-tender.  --SACROILIAC JOINT: *** Distraction. *** Marcela's with reproduction of pain to affected extremity. *** Gaenslen's Test with reproduction of pain to affected extremity. Posterior Pelvic Pain Provocative Test ***. Sacroiliac Joint Compression Test ***. Sacral Thrust/Yeoman's Test ***.  --GROSS MOTOR: Gait is non-antalgic. Easily arises from a seated position. Toe walking and heel walking are normal without significant difficulty.    --LOWER EXTREMITY MOTOR TESTING:  Plantar flexion left 5/5, right 5/5   Dorsiflexion left 5/5, right 5/5   Great toe MTP extension left 5/5, right 5/5  Knee flexion left 5/5, right 5/5  Knee extension left 5/5, right 5/5   Hip flexion left 5/5, right 5/5  Hip abduction left 5/5, right 5/5  Hip adduction left 5/5, right 5/5   --HIPS: Full range of motion bilaterally. Negative FABERs on the involved lower extremity.   --NEUROLOGICAL:  2/4 patellar, medial hamstring, and achilles reflexes bilaterally.  Sensation to light touch is intact in the bilateral L4, L5, and S1 dermatomes. Babinski is negative. No clonus.  Negative Priscila reflex  bilaterally.  --VASCULAR:  Bilateral lower extremities are warm.  There is *** pitting edema of the bilateral lower extremities.    RESULTS: Prior medical records from [facility] and Care Everywhere were reviewed today.    Imaging: Spine imaging was personally reviewed and interpreted today. The images were shown to the patient and the findings were explained using a spine model.      No results found.           ASSESSMENT: Korin Sharma is a 69 year old female who presents for consultation at the request of PCP Racquel Dunn, with a past medical history significant for vitamin D deficiency, cholelithiasis, pancreatitis, hypothyroidism, diabetes mellitus, hypertension who presents today for new patient evaluation of:    -***    Patient is neurologically intact on exam. No myelopathic or red flag symptoms.         2/6/2025     3:51 PM   OSWESTRY DISABILITY INDEX   Count 9    Sum 15    Oswestry Score (%) 33.33 %        Patient-reported            Diagnoses and all orders for this visit:  Lumbar pain  -     Spine  Referral    PLAN:  Reviewed spine anatomy and disease process. Discussed diagnosis and treatment options with the patient today. A shared decision making model was used.  The patient's values and choices were respected. The following represents what was discussed and decided upon by the provider and the patient.      -DIAGNOSTIC TESTS:  Images were personally reviewed and interpreted and explained to patient today using spine model.   --***  -- Bone density scan 12/31/2024 with normal bone density    -PHYSICAL THERAPY:  ***    Discussed the importance of core strengthening, ROM, stretching exercises with the patient and how each of these entities is important in decreasing pain.  Explained to the patient that the purpose of physical therapy is to teach the patient a home exercise program.  These exercises need to be performed every day in order to decrease pain and prevent future  occurrences of pain.        -MEDICATIONS:  ***    Discussed multiple medication options today with patient. Discussed risks, side effects, and proper use of medications. Patient verbalized understanding.    -INTERVENTIONS:  ***  Discussed risks and benefits of injections with patient today.    -PATIENT EDUCATION:  Total time of *** minutes, on the day of service, spent with the patient, reviewing the chart, placing orders, and documenting.     -FOLLOW-UP:   ***    Advised patient to call the Spine Center if symptoms worsen or you have problems controlling bladder and bowel function.   ______________________________________________________________________    SUBJECTIVE:  HPI:  Korin Sharma  Is a 69 year old female who presents today for new patient evaluation of low back pain ***    -Treatment to Date: No prior spinal surgery or spinal injection  Physical therapy eval 2/6/2025 LBP    -Medications:  Tizanidine    Current Outpatient Medications   Medication Sig Dispense Refill     tiZANidine (ZANAFLEX) 2 MG tablet Take 1 tablet (2 mg) by mouth 3 times daily as needed for muscle spasms. 30 tablet 1     aspirin 81 MG EC tablet [ASPIRIN 81 MG EC TABLET] Take 81 mg by mouth daily. (Patient not taking: Reported on 2/17/2025)       blood glucose (ACCU-CHEK RENATE PLUS) test strip Use to test blood sugar once daily or as directed. 100 strip 3     blood-glucose meter Misc [BLOOD-GLUCOSE METER MISC] Use 1 Device As Directed daily. 1 each 0     cholecalciferol, vitamin D3, 50 mcg (2,000 unit) Tab [CHOLECALCIFEROL, VITAMIN D3, 50 MCG (2,000 UNIT) TAB] Take 2,000 Units by mouth daily.       generic lancets (FINGERSTIX LANCETS) [GENERIC LANCETS (FINGERSTIX LANCETS)] Dispense brand per patient's insurance at pharmacy discretion. 100 each 3     glipiZIDE (GLUCOTROL XL) 5 MG 24 hr tablet Take 1 tablet (5 mg) by mouth daily. 90 tablet 0     levothyroxine (SYNTHROID/LEVOTHROID) 175 MCG tablet Take 1 tablet (175 mcg) by mouth daily. 90  tablet 3     lisinopril-hydrochlorothiazide (ZESTORETIC) 10-12.5 MG tablet Take 1 tablet by mouth daily. 90 tablet 3     magnesium 250 MG tablet Take 1 tablet by mouth at bedtime       metFORMIN (GLUCOPHAGE) 500 MG tablet Take 2 tablets (1,000 mg) by mouth 2 times daily (with meals). 400 tablet 0     multivitamin, therapeutic (THERA-VIT) TABS tablet Take 1 tablet by mouth daily       rosuvastatin (CRESTOR) 10 MG tablet Take 1 tablet (10 mg) by mouth daily. 90 tablet 3     No current facility-administered medications for this visit.       No Known Allergies    No past medical history on file.     Patient Active Problem List   Diagnosis     Obesity     Hypothyroidism     Cholelithiasis     Acute Pancreatitis     Menopause Has Occurred     Vitamin D Deficiency     Diabetes mellitus (H)     Benign essential hypertension     Hypertriglyceridemia     Prediabetes     Lumbar spine pain       Past Surgical History:   Procedure Laterality Date     C SHLDR ARTHROSCOP,DIAGNOSTIC      Description: Arthroscopy Shoulder Right;  Recorded: 08/15/2007;  Comments: w/decompression     CHOLECYSTECTOMY       HC KNEE SCOPE, DIAGNOSTIC      Description: Arthroscopy Knee Left;  Recorded: 08/15/2007;     HC KNEE SCOPE, DIAGNOSTIC      Description: Arthroscopy Knee Left;  Proc Date: 08/01/2007;      REDUCTION OF LARGE BREAST      Description: Breast Surgery Reduction Procedure Bilateral;  Recorded: 08/15/2007;     MAMMOPLASTY REDUCTION Bilateral Around 2008     ND STAPEDECTOMY      Description: Stapedectomy;  Recorded: 08/15/2007;  Comments: assoc hearing loss---uses aid     ZZC APPENDECTOMY,RUPT APPENDX+ABSCESS      Description: Appendectomy For Ruptured Appendix;  Proc Date: 10/01/2005;       Family History   Problem Relation Age of Onset     Heart Disease Mother      Diabetes Mother      Diabetes Father      Hypertension Father      Atrial fibrillation Father        Reviewed past medical, surgical, and family history with patient found  "on new patient intake packet located in [employer] tab.     SOCIAL HX: ***    ROS: *** Specifically negative for bowel/bladder dysfunction, balance changes, headache, dizziness, foot drop, fevers, chills, appetite changes, nausea/vomiting, unexplained weight loss. Otherwise 13 systems reviewed are negative. Please see the patient's intake questionnaire from today for details.    OBJECTIVE:  BP (!) 141/66 (BP Location: Left arm, Patient Position: Sitting, Cuff Size: Adult Regular)   Pulse 76   Ht 5' 6\" (1.676 m)   Wt 226 lb (102.5 kg)   LMP  (LMP Unknown)   BMI 36.48 kg/m      PHYSICAL EXAMINATION:    --CONSTITUTIONAL:  Vital signs as above.  No acute distress.  The patient is well nourished and well groomed.  --PSYCHIATRIC:  Appropriate mood and affect. The patient is awake, alert, oriented to person, place, time and answering questions appropriately with clear speech.    --SKIN:  Skin over the face, bilateral lower extremities, and posterior torso is clean, dry, intact without rashes.    --RESPIRATORY: Normal rhythm and effort. No abnormal accessory muscle breathing patterns noted.   --STANDING EXAMINATION:  Normal lumbar lordosis noted, no lateral shift.  --MUSCULOSKELETAL: Range of motion is not limited in lumbar flexion, extension, lateral rotation. No tenderness to palpation lumbar spine. Straight leg raising is negative to radicular pain. Sciatic notch non-tender.  --SACROILIAC JOINT: *** Distraction. *** Marcela's with reproduction of pain to affected extremity. *** Gaenslen's Test with reproduction of pain to affected extremity. Posterior Pelvic Pain Provocative Test ***. Sacroiliac Joint Compression Test ***. Sacral Thrust/Yeoman's Test ***.  --GROSS MOTOR: Gait is non-antalgic. Easily arises from a seated position. Toe walking and heel walking are normal without significant difficulty.    --LOWER EXTREMITY MOTOR TESTING:  Plantar flexion left 5/5, right 5/5   Dorsiflexion left 5/5, right 5/5   Great toe " MTP extension left 5/5, right 5/5  Knee flexion left 5/5, right 5/5  Knee extension left 5/5, right 5/5   Hip flexion left 5/5, right 5/5  Hip abduction left 5/5, right 5/5  Hip adduction left 5/5, right 5/5   --HIPS: Full range of motion bilaterally. Negative FABERs on the involved lower extremity.   --NEUROLOGICAL:  2/4 patellar, medial hamstring, and achilles reflexes bilaterally.  Sensation to light touch is intact in the bilateral L4, L5, and S1 dermatomes. Babinski is negative. No clonus.  Negative Priscila reflex bilaterally.  --VASCULAR:  Bilateral lower extremities are warm.  There is *** pitting edema of the bilateral lower extremities.    RESULTS: Prior medical records from [facility] and Care Everywhere were reviewed today.    Imaging: Spine imaging was personally reviewed and interpreted today. The images were shown to the patient and the findings were explained using a spine model.      No results found.             Again, thank you for allowing me to participate in the care of your patient.        Sincerely,        Rosita Waldrop CNP    Electronically signed

## 2025-03-11 NOTE — TELEPHONE ENCOUNTER
"Patient Returning Call  Reason for call:  Appointment needed  Information relayed to patient:  \"I am fine with whatever she is comfortable with.  If she chooses the virtual option, I will still need her to come in for labs, but she won't be in the clinic for as long.  Thanks.\"  Patient has additional questions:  No. Patient is agreeable and was transferred to scheduling.  If YES, what are your questions/concerns:  NA  Okay to leave a detailed message?: No call back needed      " -She can continue her regular antihypertensive medications, the medication reconciliation is completed

## 2025-03-13 ENCOUNTER — VIRTUAL VISIT (OUTPATIENT)
Dept: PHARMACY | Facility: CLINIC | Age: 70
End: 2025-03-13
Payer: COMMERCIAL

## 2025-03-13 DIAGNOSIS — I10 BENIGN ESSENTIAL HYPERTENSION: ICD-10-CM

## 2025-03-13 DIAGNOSIS — Z78.9 TAKES DIETARY SUPPLEMENTS: ICD-10-CM

## 2025-03-13 DIAGNOSIS — E03.9 HYPOTHYROIDISM, UNSPECIFIED TYPE: Primary | ICD-10-CM

## 2025-03-13 DIAGNOSIS — L03.115 CELLULITIS OF RIGHT LOWER EXTREMITY: ICD-10-CM

## 2025-03-13 DIAGNOSIS — E78.1 HYPERTRIGLYCERIDEMIA: ICD-10-CM

## 2025-03-13 DIAGNOSIS — E03.9 ACQUIRED HYPOTHYROIDISM: ICD-10-CM

## 2025-03-13 DIAGNOSIS — E11.9 TYPE 2 DIABETES MELLITUS WITHOUT COMPLICATION, WITHOUT LONG-TERM CURRENT USE OF INSULIN (H): Primary | ICD-10-CM

## 2025-03-13 RX ORDER — GLIPIZIDE 10 MG/1
10 TABLET, FILM COATED, EXTENDED RELEASE ORAL DAILY
Qty: 30 TABLET | Refills: 1 | Status: SHIPPED | OUTPATIENT
Start: 2025-03-13

## 2025-03-13 RX ORDER — CEPHALEXIN 500 MG/1
500 CAPSULE ORAL 4 TIMES DAILY
COMMUNITY
End: 2025-03-17

## 2025-03-13 NOTE — PATIENT INSTRUCTIONS
"Recommendations from today's MTM visit:                                                    MTM (medication therapy management) is a service provided by a clinical pharmacist designed to help you get the most of out of your medicines.   Today we reviewed what your medicines are for, how to know if they are working, that your medicines are safe and how to make your medicine regimen as easy as possible.      Increase glipizide XL to 10mg every morning with first meal of the day - I sent a new prescription to [facility] pharmacy   I will check with primary care provider about decreasing levothyroxine dose following low TSH level with last labs     Follow-up: Return in 4 weeks (on 4/10/2025) for Follow up, with me, using a phone visit.    It was great speaking with you today.  I value your experience and would be very thankful for your time in providing feedback in our clinic survey. In the next few days, you may receive an email or text message from [employer] with a link to a survey related to your  clinical pharmacist.\"     To schedule another MTM appointment, please call the clinic directly or you may call the MTM scheduling line at [phone].    My Clinical Pharmacist's contact information:                                                      Please feel free to contact me with any questions or concerns you have.      Carine Man, Joey  Medication Therapy Management (MTM) Pharmacist   [facility]     "

## 2025-03-13 NOTE — Clinical Note
Martín Clement! I met with Yady for a med review to talk about diabetes management. Her blood sugars have increased so I am going to increase her glipizide dose, still wants to stay off GLP-1, will revisit cost of SGLT2 after optimizing glipizide dose. I saw where her TSH was low - do you recommend decreasing her levothyroxine dose? Thanks! 
Passed

## 2025-03-13 NOTE — PROGRESS NOTES
Medication Therapy Management (MTM) Encounter    ASSESSMENT:                            Medication Adherence/Access: No issues identified.    Diabetes   Patient is not meeting A1c goal of < 7%. Self monitoring of blood glucose is not at goal of fasting  mg/dL, potentially due to inflammation and stress she has been under with her recent back fracture. Will increase glipizide XL to 10mg daily. Patient not interested in GLP-1, SGLT2 inhibitor historically limited by cost though can explore this again if additional medication is needed. Recommend patient continue to work on dietary changes, limited by back fracture for physical activity. Will discuss deprescribing aspirin with patient at next visit considering age and primary prevention status.       Hypertension   Patient is not meeting blood pressure goal of < 130/80mmHg per last clinic blood pressure though was at goal before this, will continue to monitor with future appointments.    Hyperlipidemia   Stable, on moderate intensity statin, LDL at goal of <70 on last lipid panel.     Hypothyroidism   Last TSH is below normal range - will check with primary care provider about need for levothyroxine dose decrease.    Cellulitis   Improving, continuing on course of antibiotics.     Supplements   Both vitamin D and magnesium within normal range on last lab draw.     PLAN:                            Increase glipizide XL to 10mg every morning with first meal of the day - I sent a new prescription to [facility] pharmacy   I will check with primary care provider about decreasing levothyroxine dose following low TSH level with last labs     Follow-up: Return in 4 weeks (on 4/10/2025) for Follow up, with me, using a phone visit.    SUBJECTIVE/OBJECTIVE:                          Yady Sharma is a 70 year old female seen for an initial visit. She was previously followed by MTM pharmacist Antonieta Ardon, PharmD - last seen 1/8/24.      Reason for visit: medication review -  diabetes.    Allergies/ADRs: Reviewed in chart  Past Medical History: Reviewed in chart  Tobacco: She reports that she has quit smoking. Her smoking use included cigarettes. She has a 13 pack-year smoking history. She has been exposed to tobacco smoke. She has never used smokeless tobacco.  Alcohol: not currently using    Medication Adherence/Access: no issues reported.    Diabetes   Metformin 1000mg twice daily   Glipizide XL 5mg daily - started by primary care provider in 2024 following elevated A1c though patient does not think she had a noticeable benefit to blood sugars with it   Aspirin 81mg daily - per chart review, for primary prevention   Patient declines side effects.  Blood sugar monitoring: occasionally; Ranges: (patient reported) at random times throughout the day:   Mornin's. Highest is 160s for a couple of days likely due to diet. Notes that in the evening she has seen blood sugars around 140's.  Working on diet - more veggies, rice and potatoes limited to several times per week, not using real sugar  Physical activity is limited by compression fracture in her back right now.     Medication History:  Ozempic - limited by cost so was working with patient assistance program, noted some side effects, patient not interested in restarting since she does not like what she has been hearing about it, also issues with getting consistent supply through patient assistance program   Jardiance - limited by cost, copay of $400 at the end of      Blood sugar monitorin time(s) daily; Ranges: (patient reported) Fasting- 230s-240s (attributes to inflammation from fracture in her back, being more stressed and less mobile due to this) ---> previously was in the 130s-140s    Eye exam in the last 12 months? No - patient aware she is due for this  Foot exam is up to date    Hypertension   Lisinopril-hydrochlorothiazide 10-12.5mg daily   Patient reports no current medication side effects  Patient does not  "self-monitor blood pressure.      BP Readings from Last 3 Encounters:   02/17/25 (!) 141/66   02/04/25 120/76   01/28/25 130/78        Hyperlipidemia   Rosuvastatin 10mg daily  Side effects/concerns? denies       Hypothyroidism   Levothyroxine 175 mcg daily. Patient notes that she daily in the morning with food and other medications.   Patient is having the following symptoms: none.        Cellulitis   Cephalexin 500mg four times daily for 7 days - started at ED visit on 3/10/25  Side effects/concerns? Denies  \"Non-toxic appearing Exam notable for bilateral pitting lower extremity edema, right medial foot/ankle with erythema and warmth consistent with cellulitis\" per ED discharge summary     Supplements   Vitamin D 2000 international unit(s) daily   Magnesium 250mg at bedtime - notes that this is effective for leg cramps  Multivitamin 1 tablet daily   Last DEXA 12/31/2024 ---> within normal limits   No reported issues at this time.        Today's Vitals: LMP  (LMP Unknown)   ----------------    I spent 20 minutes with this patient today. All changes were made via collaborative practice agreement with ASHLEY Chavez CNP.     A summary of these recommendations was sent via [employer].    Carine Man PharmD  Medication Therapy Management (MTM) Pharmacist   [facility]    Telemedicine Visit Details  The patient's medications can be safely assessed via a telemedicine encounter.  Type of service:  Telephone visit  Originating Location (pt. Location): Home    Distant Location (provider location):  On-site  Start Time:  12:30 PM  End Time:  12:50 PM     Medication Therapy Recommendations  Diabetes mellitus (H)   1 Current Medication: glipiZIDE (GLUCOTROL XL) 5 MG 24 hr tablet (Discontinued)   Current Medication Sig: Take 1 tablet (5 mg) by mouth daily.   Rationale: Dose too low - Dosage too low - Effectiveness   Recommendation: Increase Dose - glipiZIDE XL 10 MG 24 hr tablet   Status: Accepted " per CPA   Identified Date: 3/13/2025 Completed Date: 3/13/2025         Hypothyroidism   1 Current Medication: levothyroxine (SYNTHROID/LEVOTHROID) 175 MCG tablet   Current Medication Sig: Take 1 tablet (175 mcg) by mouth daily.   Rationale: Dose too high - Dosage too high - Safety   Recommendation: Decrease Dose - levothyroxine 150 MCG tablet   Status: Contact Provider - Awaiting Response   Identified Date: 3/13/2025

## 2025-03-17 ENCOUNTER — ANCILLARY PROCEDURE (OUTPATIENT)
Dept: GENERAL RADIOLOGY | Facility: CLINIC | Age: 70
End: 2025-03-17
Attending: NURSE PRACTITIONER
Payer: OTHER MISCELLANEOUS

## 2025-03-17 ENCOUNTER — LAB (OUTPATIENT)
Dept: LAB | Facility: CLINIC | Age: 70
End: 2025-03-17
Payer: COMMERCIAL

## 2025-03-17 DIAGNOSIS — S32.030A CLOSED COMPRESSION FRACTURE OF L3 LUMBAR VERTEBRA, INITIAL ENCOUNTER (H): ICD-10-CM

## 2025-03-17 DIAGNOSIS — E03.9 HYPOTHYROIDISM, UNSPECIFIED TYPE: ICD-10-CM

## 2025-03-17 DIAGNOSIS — M54.50 ACUTE LEFT-SIDED LOW BACK PAIN WITHOUT SCIATICA: ICD-10-CM

## 2025-03-17 LAB — TSH SERPL DL<=0.005 MIU/L-ACNC: 2.11 UIU/ML (ref 0.3–4.2)

## 2025-03-17 PROCEDURE — 36415 COLL VENOUS BLD VENIPUNCTURE: CPT

## 2025-03-17 PROCEDURE — 72100 X-RAY EXAM L-S SPINE 2/3 VWS: CPT | Mod: TC | Performed by: INTERNAL MEDICINE

## 2025-03-17 PROCEDURE — 84443 ASSAY THYROID STIM HORMONE: CPT

## 2025-03-17 NOTE — PROGRESS NOTES
Assessment:     Diagnoses and all orders for this visit:  Acute left-sided low back pain without sciatica  Closed compression fracture of L3 lumbar vertebra, sequela  Injury caused by lifting, initial encounter     Korin Sharma is a 70 year old y.o. female with past medical history significant for vitamin D deficiency, cholelithiasis, pancreatitis, hypothyroidism, diabetes mellitus, hypertension who presents today for follow-up regarding:    -Acute midline and left low back pain post lifting injury 1/16/2025 at work      Plan:     A shared decision making plan was used. The patient's values and choices were respected. Prior medical records were reviewed today. The following represents what was discussed and decided upon by the provider and the patient.        -DIAGNOSTIC TESTS: Images were personally reviewed and interpreted.   -- Improving and resolved for further imaging at this point.  Did explain to patient if her pain changes or worsens at any point I would recommend updated imaging.  --Lumbar spine x-ray 3/17/2025 with lumbosacral anatomy with partial sacralization of L5.  Mild compression deformity of L3, stable since prior x-ray.  --Lumbar spine x-ray 2/17/2025 with mild vertebral height loss of L3, age indeterminant.  -- Bone density scan 12/31/2024 with normal bone density     -INTERVENTIONS: No recommendations for interventions at this time    -Did advise patient to continue wearing LSO back brace at this time for the next 4 weeks.  If she is doing well at that point then we can wean off of wearing the brace at that time.  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  Discussed with patient that in 4 weeks timeframe if she still having mild pain we could do restrictions at 50 pound lifting limit for couple weeks and then likely lift restrictions at that time.  If she has no pain we could lift restrictions in 4 weeks.  Did advise patient to continue  with precautions with bending lifting and twisting, no lifting over 10 pounds.    -MEDICATIONS: No changes in medications today.  Discussed side effects of medications and proper use. Patient verbalized understanding.    -PHYSICAL THERAPY: In 4 weeks if she is doing well we can revisit physical therapy at that time.  Discussed the importance of core strengthening, ROM, stretching exercises with the patient and how each of these entities is important in decreasing pain.  Explained to the patient that the purpose of physical therapy is to teach the patient a home exercise program.  These exercises need to be performed every day in order to decrease pain and prevent future occurrences of pain.        -PATIENT EDUCATION:  Total time of 42 minutes, on the day of service, spent with the patient, reviewing the chart, placing orders, and documenting.     -FOLLOW UP: Follow-up 4 weeks virtual visit is okay as she does live over 45 minutes away.  Advised to contact clinic if symptoms worsen or change.    Subjective:     Korin Sharma is a 70 year old female who presents today for follow-up regarding ongoing midline lumbar spine since lifting injury 1/16/2024.  In the last 4 weeks she has noticed some improvement in pain but still has minimal back pain that she describes as a 1/10 up to 3 at its worst.  She does report that she has a high pain tolerance, and her partner confirms this as well.  She does report that bending lifting and twisting does aggravate, walking does make her feel better.  She otherwise denies any new symptoms, denies any radiating lower extremity symptoms.  Denies numbness or tingling sensations.  Denies any episodes of her legs giving out on her.  Denies bowel or bladder loss control, denies saddle    Patient's female partner was present today during entire visit and added to past medical/surgical/family/social history and history of presenting illness.     *Work injury 1/16/2025.  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     -Treatment to Date: No prior spinal surgery or spinal injection  Physical therapy eval 2/6/2025 LBP     -Medications:  Tizanidine    Patient Active Problem List   Diagnosis    Obesity    Hypothyroidism    Cholelithiasis    Acute Pancreatitis    Menopause Has Occurred    Vitamin D Deficiency    Diabetes mellitus (H)    Benign essential hypertension    Hypertriglyceridemia    Prediabetes    Lumbar spine pain       Current Outpatient Medications   Medication Sig Dispense Refill    aspirin 81 MG EC tablet [ASPIRIN 81 MG EC TABLET] Take 81 mg by mouth daily.      blood glucose (ACCU-CHEK RENATE PLUS) test strip Use to test blood sugar once daily or as directed. 100 strip 3    blood-glucose meter Misc [BLOOD-GLUCOSE METER MISC] Use 1 Device As Directed daily. 1 each 0    cephALEXin (KEFLEX) 500 MG capsule Take 1 capsule (500 mg) by mouth 2 times daily. 6 capsule 0    cholecalciferol, vitamin D3, 50 mcg (2,000 unit) Tab [CHOLECALCIFEROL, VITAMIN D3, 50 MCG (2,000 UNIT) TAB] Take 2,000 Units by mouth daily.      furosemide (LASIX) 20 MG tablet Take 1 tablet (20 mg) by mouth daily. 5 tablet 0    generic lancets (FINGERSTIX LANCETS) [GENERIC LANCETS (FINGERSTIX LANCETS)] Dispense brand per patient's insurance at pharmacy discretion. 100 each 3    glipiZIDE (GLUCOTROL XL) 10 MG 24 hr tablet Take 1 tablet (10 mg) by mouth daily. 30 tablet 1    levothyroxine (SYNTHROID/LEVOTHROID) 175 MCG tablet Take 1 tablet (175 mcg) by mouth daily. 90 tablet 3    lisinopril-hydrochlorothiazide (ZESTORETIC) 10-12.5 MG tablet Take 1 tablet by mouth daily. 90 tablet 3    magnesium 250 MG tablet Take 1 tablet by mouth at bedtime      metFORMIN (GLUCOPHAGE) 500 MG tablet Take 2 tablets (1,000 mg) by mouth 2 times daily (with meals). 400 tablet 0    multivitamin, therapeutic (THERA-VIT) TABS tablet Take 1 tablet by mouth daily       "rosuvastatin (CRESTOR) 10 MG tablet Take 1 tablet (10 mg) by mouth daily. 90 tablet 3     No current facility-administered medications for this visit.       No Known Allergies    History reviewed. No pertinent past medical history.     Review of Systems  ROS:  Specifically negative for bowel/bladder dysfunction, balance changes, headache, dizziness, foot drop, fevers, chills, appetite changes, nausea/vomiting, unexplained weight loss. Otherwise 13 systems reviewed are negative. Please see the patient's intake questionnaire from today for details.    Reviewed Social, Family, Past Medical and Past Surgical history with patient, no significant changes noted since prior visit.     Objective:     /71 (BP Location: Left arm, Patient Position: Sitting, Cuff Size: Adult Regular)   Pulse 78   Ht 5' 6\" (1.676 m)   Wt 238 lb 3.2 oz (108 kg)   LMP  (LMP Unknown)   BMI 38.45 kg/m      PHYSICAL EXAMINATION:    --CONSTITUTIONAL: Well developed, well nourished, healthy appearing individual.  --PSYCHIATRIC: Appropriate mood and affect. No difficulty interacting due to temper, social withdrawal, or memory issues.  --SKIN: Lumbar region is dry and intact.   --RESPIRATORY: Normal rhythm and effort. No abnormal accessory muscle breathing patterns noted.   --MUSCULOSKELETAL:  Normal lumbar lordosis noted, no lateral shift.  --GROSS MOTOR: Easily arises from a seated position. Gait is non-antalgic  --LUMBAR SPINE:  Inspection reveals no evidence of deformity. Range of motion is not limited in lumbar flexion, extension, lateral rotation. No tenderness to palpation lumbar spine. Straight leg raising is negative to radicular pain. Sciatic notch non-tender.   --LOWER EXTREMITY MOTOR TESTING:  Plantar flexion left 5/5, right 5/5   Dorsiflexion left 5/5, right 5/5   Great toe MTP extension left 5/5, right 5/5  Knee flexion left 5/5, right 5/5  Knee extension left 5/5, right 5/5   Hip flexion left 5/5, right 5/5  Hip abduction left " 5/5, right 5/5  Hip adduction left 5/5, right 5/5   --HIPS: Full range of motion bilaterally.   --NEUROLOGIC: Bilateral patellar and achilles reflexes are physiologic and symmetric. Sensation to light touch is intact in the bilateral L4, L5, and S1 dermatomes.    RESULTS:   Imaging: Spine imaging was reviewed today. The images were shown to the patient and the findings were explained using a spine model.      XR Lumbar Spine 2/3 Views  Result Date: 3/17/2025  EXAM: XR LUMBAR SPINE 2/3 VIEWS LOCATION: [facility] DATE: 3/17/2025 INDICATION: Evaluate L3 compression fracture stability COMPARISON: Lumbar spine radiograph 02/17/2025.   IMPRESSION: Transitional lumbosacral anatomy, with partial sacralization of the L5 vertebral body. Mild compression deformity of the superior endplate of L3, not significantly changed in height loss from the prior radiograph. Remaining lumbar vertebral body heights appear maintained. Alignment of lumbar spine is normal. Moderate disc height loss at L4-L5. Facet hypertrophy in the lower lumbar spine. Surgical clips in right upper quadrant.      XR Lumbar Spine 2/3 Views  Result Date: 2/17/2025  EXAM: XR LUMBAR SPINE 2/3 VIEWS LOCATION: [facility] DATE: 2/17/2025 INDICATION:  Acute left-sided low back pain without sciatica, Injury caused by lifting, initial encounter COMPARISON: None.   IMPRESSION: Transitional lumbosacral segment considered to be L5. Mild height loss involving the L3 vertebral body, age indeterminate. Severe degenerative disc disease at L4-5. Moderate lower lumbar facet arthropathy.

## 2025-03-18 ENCOUNTER — OFFICE VISIT (OUTPATIENT)
Dept: PHYSICAL MEDICINE AND REHAB | Facility: CLINIC | Age: 70
End: 2025-03-18
Payer: OTHER MISCELLANEOUS

## 2025-03-18 VITALS
BODY MASS INDEX: 38.28 KG/M2 | SYSTOLIC BLOOD PRESSURE: 118 MMHG | WEIGHT: 238.2 LBS | HEART RATE: 78 BPM | DIASTOLIC BLOOD PRESSURE: 71 MMHG | HEIGHT: 66 IN

## 2025-03-18 DIAGNOSIS — S32.030S CLOSED COMPRESSION FRACTURE OF L3 LUMBAR VERTEBRA, SEQUELA: ICD-10-CM

## 2025-03-18 DIAGNOSIS — M54.50 ACUTE LEFT-SIDED LOW BACK PAIN WITHOUT SCIATICA: Primary | ICD-10-CM
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
## 2025-03-18 PROCEDURE — 99215 OFFICE O/P EST HI 40 MIN: CPT | Performed by: NURSE PRACTITIONER

## 2025-03-18 ASSESSMENT — PAIN SCALES - GENERAL: PAINLEVEL_OUTOF10: MILD PAIN (1)

## 2025-03-18 NOTE — PATIENT INSTRUCTIONS
~Spine Center Scheduling #[phone].  ~Please call our [facility] Nurse Navigation #[phone] with any questions or concerns about your treatment plan, if symptoms worsen and you would like to be seen urgently, or if you have problems controlling bladder and bowel function.  ~For any future flareups or new symptoms, recommend follow-up in clinic or contact the nurse navigator line.  ~Please note that any My Chart messages may take multiple days for a response due to the high volume of patients seen in clinic.  Anything sent Thursday night or after will be answered the following week when able, as Rosita Waldrop CNP does not work in clinic on Fridays.   ~Rosita Waldrop CNP is at the [facility] on Tuesdays, otherwise primarily at the [facility].

## 2025-03-18 NOTE — Clinical Note
3/18/2025      Korin Sharma  [address]  [facility] [address]      Dear Colleague,    Thank you for referring your patient, Korin Sharma, to the [facility]. Please see a copy of my visit note below.      Assessment:     Diagnoses and all orders for this visit:  Acute left-sided low back pain without sciatica  Closed compression fracture of L3 lumbar vertebra, sequela     Korin Sharma is a 70 year old y.o. female with past medical history significant for vitamin D deficiency, cholelithiasis, pancreatitis, hypothyroidism, diabetes mellitus, hypertension who presents today for follow-up regarding:    -***     Plan:     A shared decision making plan was used. The patient's values and choices were respected. Prior medical records were reviewed today. The following represents what was discussed and decided upon by the provider and the patient.        -DIAGNOSTIC TESTS: Images were personally reviewed and interpreted.   --***  --Lumbar spine x-ray 3/17/2025 with lumbosacral anatomy with partial sacralization of L5.  Mild compression deformity of L3, stable since prior x-ray.  --Lumbar spine x-ray 2/17/2025 with mild vertebral height loss of L3, age indeterminant.  -- Bone density scan 12/31/2024 with normal bone density     -INTERVENTIONS: ***    -MEDICATIONS: ***   Discussed side effects of medications and proper use. Patient verbalized understanding.    -PHYSICAL THERAPY: ***  Discussed the importance of core strengthening, ROM, stretching exercises with the patient and how each of these entities is important in decreasing pain.  Explained to the patient that the purpose of physical therapy is to teach the patient a home exercise program.  These exercises need to be performed every day in order to decrease pain and prevent future occurrences of pain.        -PATIENT EDUCATION:  Total time of *** minutes, on the day of service, spent with the patient, reviewing the chart,  placing orders, and documenting.     -FOLLOW UP: ***   Advised to contact clinic if symptoms worsen or change.    Subjective:     Korin Sharma is a 70 year old female who presents today for follow-up regarding ***    *Work injury 1/16/2025.  Patient is a  was putting on a trailer and lifting the hitch to go over the ball, immediately noted severe pain.     -Treatment to Date: No prior spinal surgery or spinal injection  Physical therapy eval 2/6/2025 LBP     -Medications:  Tizanidine    Patient Active Problem List   Diagnosis    Obesity    Hypothyroidism    Cholelithiasis    Acute Pancreatitis    Menopause Has Occurred    Vitamin D Deficiency    Diabetes mellitus (H)    Benign essential hypertension    Hypertriglyceridemia    Prediabetes    Lumbar spine pain       Current Outpatient Medications   Medication Sig Dispense Refill    aspirin 81 MG EC tablet [ASPIRIN 81 MG EC TABLET] Take 81 mg by mouth daily.      blood glucose (ACCU-CHEK RENATE PLUS) test strip Use to test blood sugar once daily or as directed. 100 strip 3    blood-glucose meter Misc [BLOOD-GLUCOSE METER MISC] Use 1 Device As Directed daily. 1 each 0    cephALEXin (KEFLEX) 500 MG capsule Take 1 capsule (500 mg) by mouth 2 times daily. 6 capsule 0    cephALEXin (KEFLEX) 500 MG capsule Take 500 mg by mouth 4 times daily.      cholecalciferol, vitamin D3, 50 mcg (2,000 unit) Tab [CHOLECALCIFEROL, VITAMIN D3, 50 MCG (2,000 UNIT) TAB] Take 2,000 Units by mouth daily.      furosemide (LASIX) 20 MG tablet Take 1 tablet (20 mg) by mouth daily. 5 tablet 0    generic lancets (FINGERSTIX LANCETS) [GENERIC LANCETS (FINGERSTIX LANCETS)] Dispense brand per patient's insurance at pharmacy discretion. 100 each 3    glipiZIDE (GLUCOTROL XL) 10 MG 24 hr tablet Take 1 tablet (10 mg) by mouth daily. 30 tablet 1    levothyroxine (SYNTHROID/LEVOTHROID) 175 MCG tablet Take 1 tablet (175 mcg) by mouth daily. 90 tablet 3    lisinopril-hydrochlorothiazide  (ZESTORETIC) 10-12.5 MG tablet Take 1 tablet by mouth daily. 90 tablet 3    magnesium 250 MG tablet Take 1 tablet by mouth at bedtime      metFORMIN (GLUCOPHAGE) 500 MG tablet Take 2 tablets (1,000 mg) by mouth 2 times daily (with meals). 400 tablet 0    multivitamin, therapeutic (THERA-VIT) TABS tablet Take 1 tablet by mouth daily      rosuvastatin (CRESTOR) 10 MG tablet Take 1 tablet (10 mg) by mouth daily. 90 tablet 3     No current facility-administered medications for this visit.       No Known Allergies    No past medical history on file.     Review of Systems  ROS: ***  Specifically negative for bowel/bladder dysfunction, balance changes, headache, dizziness, foot drop, fevers, chills, appetite changes, nausea/vomiting, unexplained weight loss. Otherwise 13 systems reviewed are negative. Please see the patient's intake questionnaire from today for details.    Reviewed Social, Family, Past Medical and Past Surgical history with patient, no significant changes noted since prior visit.     Objective:     LMP  (LMP Unknown)     PHYSICAL EXAMINATION:    --CONSTITUTIONAL: Well developed, well nourished, healthy appearing individual.  --PSYCHIATRIC: Appropriate mood and affect. No difficulty interacting due to temper, social withdrawal, or memory issues.  --SKIN: Lumbar region is dry and intact.   --RESPIRATORY: Normal rhythm and effort. No abnormal accessory muscle breathing patterns noted.   --MUSCULOSKELETAL:  Normal lumbar lordosis noted, no lateral shift.  --GROSS MOTOR: Easily arises from a seated position. Gait is non-antalgic  --LUMBAR SPINE:  Inspection reveals no evidence of deformity. Range of motion is not limited in lumbar flexion, extension, lateral rotation. No tenderness to palpation lumbar spine. Straight leg raising is negative to radicular pain. Sciatic notch non-tender.   --SACROILIAC JOINT: *** Distraction. *** Marcela's with reproduction of pain to affected extremity. *** Gaenslen's Test with  reproduction of pain to affected extremity. Posterior Pelvic Pain Provocative Test ***. Sacroiliac Joint Compression Test ***. Sacral Thrust/Yeoman's Test ***.  --LOWER EXTREMITY MOTOR TESTING:  Plantar flexion left 5/5, right 5/5   Dorsiflexion left 5/5, right 5/5   Great toe MTP extension left 5/5, right 5/5  Knee flexion left 5/5, right 5/5  Knee extension left 5/5, right 5/5   Hip flexion left 5/5, right 5/5  Hip abduction left 5/5, right 5/5  Hip adduction left 5/5, right 5/5   --HIPS: Full range of motion bilaterally.   --NEUROLOGIC: Bilateral patellar and achilles reflexes are physiologic and symmetric. Sensation to light touch is intact in the bilateral L4, L5, and S1 dermatomes.    RESULTS:   Imaging: Spine imaging was reviewed today. The images were shown to the patient and the findings were explained using a spine model.      XR Lumbar Spine 2/3 Views  Result Date: 3/17/2025  EXAM: XR LUMBAR SPINE 2/3 VIEWS LOCATION: [facility] DATE: 3/17/2025 INDICATION: Evaluate L3 compression fracture stability COMPARISON: Lumbar spine radiograph 02/17/2025.   IMPRESSION: Transitional lumbosacral anatomy, with partial sacralization of the L5 vertebral body. Mild compression deformity of the superior endplate of L3, not significantly changed in height loss from the prior radiograph. Remaining lumbar vertebral body heights appear maintained. Alignment of lumbar spine is normal. Moderate disc height loss at L4-L5. Facet hypertrophy in the lower lumbar spine. Surgical clips in right upper quadrant.      XR Lumbar Spine 2/3 Views  Result Date: 2/17/2025  EXAM: XR LUMBAR SPINE 2/3 VIEWS LOCATION: [facility] DATE: 2/17/2025 INDICATION:  Acute left-sided low back pain without sciatica, Injury caused by lifting, initial encounter COMPARISON: None.   IMPRESSION: Transitional lumbosacral segment considered to be L5. Mild height loss involving the L3 vertebral body, age indeterminate.  Severe degenerative disc disease at L4-5. Moderate lower lumbar facet arthropathy.                            Again, thank you for allowing me to participate in the care of your patient.        Sincerely,        Rosita Waldrop CNP    Electronically signed

## 2025-03-18 NOTE — LETTER
3/18/2025    Korin Sharma   1955        To Whom it May Concern;    Please excuse Korin Sharma from work/school for a healthcare visit on Mar 18, 2025.    Sincerely,        Rosita Waldrop, CNP

## 2025-03-18 NOTE — LETTER
March 18, 2025      Korin Sharma  [address]  [facility] [address]        To Whom It May Concern:    Korin Sharma was seen in our clinic. She may return to work with the following: 10 pound lifting limit, limiting bending and twisting as tolerated.  Restrictions for 4 weeks.  Will revisit further workability needs at that time.      Sincerely,      Rosita Waldrop      Electronically signed

## 2025-03-20 ENCOUNTER — OFFICE VISIT (OUTPATIENT)
Dept: FAMILY MEDICINE | Facility: CLINIC | Age: 70
End: 2025-03-20
Payer: COMMERCIAL

## 2025-03-20 ENCOUNTER — HOSPITAL ENCOUNTER (OUTPATIENT)
Dept: ULTRASOUND IMAGING | Facility: HOSPITAL | Age: 70
Discharge: HOME | End: 2025-03-20
Payer: COMMERCIAL

## 2025-03-20 ENCOUNTER — TELEPHONE (OUTPATIENT)
Dept: FAMILY MEDICINE | Facility: CLINIC | Age: 70
End: 2025-03-20

## 2025-03-20 VITALS
OXYGEN SATURATION: 98 % | HEIGHT: 66 IN | SYSTOLIC BLOOD PRESSURE: 138 MMHG | RESPIRATION RATE: 18 BRPM | WEIGHT: 238.4 LBS | HEART RATE: 82 BPM | DIASTOLIC BLOOD PRESSURE: 72 MMHG | TEMPERATURE: 97.9 F | BODY MASS INDEX: 38.31 KG/M2

## 2025-03-20 DIAGNOSIS — M79.661 PAIN OF RIGHT LOWER LEG: ICD-10-CM

## 2025-03-20 DIAGNOSIS — L03.115 CELLULITIS OF RIGHT LOWER EXTREMITY: Primary | ICD-10-CM

## 2025-03-20 PROCEDURE — 93971 EXTREMITY STUDY: CPT | Mod: RT

## 2025-03-20 RX ORDER — SULFAMETHOXAZOLE AND TRIMETHOPRIM 800; 160 MG/1; MG/1
1 TABLET ORAL 2 TIMES DAILY
Qty: 14 TABLET | Refills: 0 | Status: SHIPPED | OUTPATIENT
Start: 2025-03-20 | End: 2025-03-20

## 2025-03-20 RX ORDER — SULFAMETHOXAZOLE AND TRIMETHOPRIM 800; 160 MG/1; MG/1
1 TABLET ORAL 2 TIMES DAILY
Qty: 20 TABLET | Refills: 0 | Status: SHIPPED | OUTPATIENT
Start: 2025-03-20

## 2025-03-20 ASSESSMENT — PAIN SCALES - GENERAL: PAINLEVEL_OUTOF10: MILD PAIN (2)

## 2025-03-20 NOTE — TELEPHONE ENCOUNTER
Mabel Garrido NP requested I contact patient to see if patient still needs a follow up appointment today. Mabel said that if patients cellulitis has improved, she does not need to be seen.     Left message fro patient to return call.     Jill HARDY CMA on March 20, 2025 at 8:44 AM

## 2025-03-20 NOTE — PROGRESS NOTES
Assessment & Plan     Cellulitis of right lower extremity  Pain of right lower leg  3/6/2025 developed right lower leg swelling and redness.  3/10/2025 diagnosed with right lower extremity cellulitis, treated with Keflex 7 day course.  US negative for DVT.  3/14/2025 at ED follow up, area of redness had slight improvement with antibiotics but still present in upper outlined borders.  Extended Keflex to 10 day course.  Also had concerns about lower leg edema, R>L, prescribed furosemide 20 mg 5 day course.  Area of redness improved from ED visit but still has area of redness present in upper outlined borders.  Has developed redness, tenderness, warmth to dorsal lateral side of right foot- outlined at clinic.  Continues to have right lower leg and pedal swelling.  Started to experience sharp right pain to medical aspect of right ankle.  Will treat with bactrim for 7-10 days.  Continue to elevate right leg as much as possible, apply Ace bandage for compression, follow low-salt diet.  Since patient did not see much improvement with furosemide administration, will not repeat 5 day course.  Ordered lower extremity ultrasound for evaluation of DVT.  Denies chest pain, shortness of breath, difficulty breathing, lightheadedness.  Discussed strict ED precautions.  If symptoms do not improve, please be reevaluated on Monday.  - sulfamethoxazole-trimethoprim (BACTRIM DS) 800-160 MG tablet; Take 1 tablet by mouth 2 times daily.  - US Lower Extremity Venous Duplex Right; Future    The longitudinal plan of care for the diagnosis(es)/condition(s) as documented were addressed during this visit. Due to the added complexity in care, I will continue to support Yady in the subsequent management and with ongoing continuity of care.    Subjective   Yady is a 70 year old, presenting for the following health issues:  Follow Up (Cellulitis)      3/20/2025     1:10 PM   Additional Questions   Roomed by Jill HARDY CMA     History of Present  Illness       CKD: She is not using over the counter pain medicine.     Reason for visit:  Right foot    She eats 0-1 servings of fruits and vegetables daily.She consumes 0 sweetened beverage(s) daily. She exercises with enough effort to increase her heart rate 3 or less days per week.   She is taking medications regularly.      Patient is a 70-year old female presenting for cellulitis follow-up.  Medical history includes type 2 diabetes, HTN, hypothyroidism, hyperlipidemia, vitamin D deficiency, peripheral edema, obesity.     Per previous note: 3/10/2025 diagnosed with right lower extremity cellulitis.  Ultrasound negative for DVT.  Cellulitis was outlined at ED visit.  Prescribed Keflex 7-day course, completed 5 days of antibiotics.  Reports slight decrease of redness and swelling.  Erythema does not extended past borders.  Redness not present to lower borders but present in upper borders.  No evidence of abscess.  Will extend antibiotics for additional 3 day for total of 10 days.  +2 bilateral peripheral edema, R>L.  Will treat with furosemide 20 mg for 5 days.      Redness of right medial aspect of ankle has slightly improved with extended course of Keflex.  Continues to have redness to upper borders that were outlined at ED.  Has noted new redness, warmth, tenderness to right lateral foot.  Continues to have right lower leg and pedal edema.  Did not see significant decrease in right lower leg edema with furosemide administration.  She has been experiencing acute sharp shooting pain at medical aspect of ankle.  She has been trying to elevate right leg as much as possible.  Has not noted pustule lesion.  Denies fever or chills.      Review of Systems  Review of systems negative otherwise known HPI.    No past medical history on file.    Past Surgical History:   Procedure Laterality Date    C CHELO ARTHROSCOP,DIAGNOSTIC      Description: Arthroscopy Shoulder Right;  Recorded: 08/15/2007;  Comments: w/decompression     CHOLECYSTECTOMY      HC KNEE SCOPE, DIAGNOSTIC      Description: Arthroscopy Knee Left;  Recorded: 08/15/2007;    HC KNEE SCOPE, DIAGNOSTIC      Description: Arthroscopy Knee Left;  Proc Date: 08/01/2007;    HC REDUCTION OF LARGE BREAST      Description: Breast Surgery Reduction Procedure Bilateral;  Recorded: 08/15/2007;    MAMMOPLASTY REDUCTION Bilateral Around 2008    [facility] STAPEDECTOMY      Description: Stapedectomy;  Recorded: 08/15/2007;  Comments: assoc hearing loss---uses aid    ZZC APPENDECTOMY,RUPT APPENDX+ABSCESS      Description: Appendectomy For Ruptured Appendix;  Proc Date: 10/01/2005;       Family History   Problem Relation Age of Onset    Heart Disease Mother     Diabetes Mother     Diabetes Father     Hypertension Father     Atrial fibrillation Father        Social History     Tobacco Use    Smoking status: Former     Current packs/day: 1.00     Average packs/day: 1 pack/day for 13.0 years (13.0 ttl pk-yrs)     Types: Cigarettes     Passive exposure: Past    Smokeless tobacco: Never    Tobacco comments:     quit in 1983   Substance Use Topics    Alcohol use: Not Currently     Comment: rare       Current Outpatient Medications   Medication Sig Dispense Refill    aspirin 81 MG EC tablet [ASPIRIN 81 MG EC TABLET] Take 81 mg by mouth daily.      blood glucose (ACCU-CHEK RENATE PLUS) test strip Use to test blood sugar once daily or as directed. 100 strip 3    blood-glucose meter Misc [BLOOD-GLUCOSE METER MISC] Use 1 Device As Directed daily. 1 each 0    cephALEXin (KEFLEX) 500 MG capsule Take 1 capsule (500 mg) by mouth 2 times daily. 6 capsule 0    cholecalciferol, vitamin D3, 50 mcg (2,000 unit) Tab [CHOLECALCIFEROL, VITAMIN D3, 50 MCG (2,000 UNIT) TAB] Take 2,000 Units by mouth daily.      furosemide (LASIX) 20 MG tablet Take 1 tablet (20 mg) by mouth daily. 5 tablet 0    generic lancets (FINGERSTIX LANCETS) [GENERIC LANCETS (FINGERSTIX LANCETS)] Dispense brand per patient's insurance at pharmacy  "discretion. 100 each 3    glipiZIDE (GLUCOTROL XL) 10 MG 24 hr tablet Take 1 tablet (10 mg) by mouth daily. 30 tablet 1    levothyroxine (SYNTHROID/LEVOTHROID) 175 MCG tablet Take 1 tablet (175 mcg) by mouth daily. 90 tablet 3    lisinopril-hydrochlorothiazide (ZESTORETIC) 10-12.5 MG tablet Take 1 tablet by mouth daily. 90 tablet 3    magnesium 250 MG tablet Take 1 tablet by mouth at bedtime      metFORMIN (GLUCOPHAGE) 500 MG tablet Take 2 tablets (1,000 mg) by mouth 2 times daily (with meals). 400 tablet 0    multivitamin, therapeutic (THERA-VIT) TABS tablet Take 1 tablet by mouth daily      rosuvastatin (CRESTOR) 10 MG tablet Take 1 tablet (10 mg) by mouth daily. 90 tablet 3    sulfamethoxazole-trimethoprim (BACTRIM DS) 800-160 MG tablet Take 1 tablet by mouth 2 times daily. 20 tablet 0     No current facility-administered medications for this visit.       Objective    /72 (BP Location: Left arm, Patient Position: Sitting, Cuff Size: Adult Regular)   Pulse 82   Temp 97.9  F (36.6  C) (Temporal)   Resp 18   Ht 1.676 m (5' 6\")   Wt 108.1 kg (238 lb 6.4 oz)   LMP  (LMP Unknown)   SpO2 98%   BMI 38.48 kg/m    Body mass index is 38.48 kg/m .  Physical Exam   General: Alert, oriented, no acute distress.    Respiratory: Easy work of breathing.   Cardiovascular: Appears warm and well-perfused. +2 right lower leg and pedal edema.   Skin: Redness to upper outlined borders to right medial lower leg. Redness, tenderness, warmth to dorsal lateral side of right foot.   Neurologic: Mentation intact and speech normal.     Psychiatric: Appropriate affect.     Signed Electronically by: ASHLEY Morrow CNP    "

## 2025-03-20 NOTE — TELEPHONE ENCOUNTER
Patient returned call and the person who took the call documented in the appointment notes that patient wants to keep appointment.     Jill HARDY CMA on March 20, 2025 at 12:21 PM

## 2025-03-24 ENCOUNTER — OFFICE VISIT (OUTPATIENT)
Dept: FAMILY MEDICINE | Facility: CLINIC | Age: 70
End: 2025-03-24
Payer: COMMERCIAL

## 2025-03-24 VITALS
SYSTOLIC BLOOD PRESSURE: 122 MMHG | TEMPERATURE: 97.9 F | HEIGHT: 66 IN | BODY MASS INDEX: 38.57 KG/M2 | WEIGHT: 240 LBS | OXYGEN SATURATION: 98 % | HEART RATE: 75 BPM | DIASTOLIC BLOOD PRESSURE: 64 MMHG | RESPIRATION RATE: 18 BRPM

## 2025-03-24 DIAGNOSIS — L03.115 CELLULITIS OF RIGHT LOWER EXTREMITY: Primary | ICD-10-CM

## 2025-03-24 PROCEDURE — 3078F DIAST BP <80 MM HG: CPT

## 2025-03-24 PROCEDURE — 1125F AMNT PAIN NOTED PAIN PRSNT: CPT

## 2025-03-24 PROCEDURE — 99214 OFFICE O/P EST MOD 30 MIN: CPT

## 2025-03-24 PROCEDURE — 3074F SYST BP LT 130 MM HG: CPT

## 2025-03-24 ASSESSMENT — PAIN SCALES - GENERAL: PAINLEVEL_OUTOF10: MILD PAIN (2)

## 2025-03-24 NOTE — PROGRESS NOTES
Assessment & Plan     Cellulitis of right lower extremity  3/6/2025 developed right lower leg swelling and redness.  3/10/2025 diagnosed with right lower extremity cellulitis.  Completed 10 day course of Keflex.  Started taking Bactrim on Friday.  Erythema extending past upper outlined borders to right medial lower leg, continues to have sharp pain in right lower leg.  +2 right lower leg and pedal edema.  3/20/2025 right lower extremity ultrasound negative for DVT.  Redness, tenderness, warmth to dorsal lateral side of right foot- unchanged.  Patient denies fever or chills.  VVS.  No concerns for sepsis.  Since cellulitis is not resolved with outpatient treatment, advised ED evaluation.  Stable to drive self.  Patient would like to go to [facility] as is closer to home.  Provided report to nurse.      Subjective   Yady is a 70 year old, presenting for the following health issues:  Cellulitus (About the same as last visit)    History of Present Illness       CKD: She is not using over the counter pain medicine.     Reason for visit:  Right foot    She eats 0-1 servings of fruits and vegetables daily.She consumes 0 sweetened beverage(s) daily. She exercises with enough effort to increase her heart rate 3 or less days per week.   She is taking medications regularly.      Patient is a 70-year old female presenting for cellulitis follow-up.  Medical history includes type 2 diabetes, HTN, hypothyroidism, hyperlipidemia, vitamin D deficiency, peripheral edema, obesity.     Per previous note: 3/6/2025 developed right lower leg swelling and redness.  3/10/2025 diagnosed with right lower extremity cellulitis, treated with Keflex 7 day course.  US negative for DVT.  3/14/2025 at ED follow up, area of redness had slight improvement with antibiotics but still present in upper outlined borders.  Extended Keflex to 10 day course.  Also had concerns about lower leg edema, R>L, prescribed furosemide 20 mg 5 day course.  Area of  redness improved from ED visit but still has area of redness present in upper outlined borders and has slight extended past upper borders.  Has developed redness, tenderness, warmth to dorsal lateral side of right foot- outlined at clinic.  Continues to have right lower leg and pedal swelling.  Started to experience sharp right pain to medical aspect of right ankle.  Will treat with bactrim for 7-10 days.      3/20/2025 repeat right lower leg ultrasound did not show evidence of DVT.  Has been taking bactrim for 4 days, taking as prescribed without missing doses.  Area of redness continues to extend past upper outlined borders of the right medial ankle.  Unchanged right peripheral edema.  Continues to have intermittent sharp pain to right inner lower leg.  Unchanged redness, tenderness, warmth to dorsal lateral side of right foot- unchanged.  Has been afebrile.      Review of Systems  Review of systems negative otherwise known HPI.    No past medical history on file.    Past Surgical History:   Procedure Laterality Date    C SHLDR ARTHROSCOP,DIAGNOSTIC      Description: Arthroscopy Shoulder Right;  Recorded: 08/15/2007;  Comments: w/decompression    CHOLECYSTECTOMY      HC KNEE SCOPE, DIAGNOSTIC      Description: Arthroscopy Knee Left;  Recorded: 08/15/2007;     KNEE SCOPE, DIAGNOSTIC      Description: Arthroscopy Knee Left;  Proc Date: 08/01/2007;     REDUCTION OF LARGE BREAST      Description: Breast Surgery Reduction Procedure Bilateral;  Recorded: 08/15/2007;    MAMMOPLASTY REDUCTION Bilateral Around 2008    [facility] STAPEDECTOMY      Description: Stapedectomy;  Recorded: 08/15/2007;  Comments: assoc hearing loss---uses aid    ZZC APPENDECTOMY,RUPT APPENDX+ABSCESS      Description: Appendectomy For Ruptured Appendix;  Proc Date: 10/01/2005;       Family History   Problem Relation Age of Onset    Heart Disease Mother     Diabetes Mother     Diabetes Father     Hypertension Father     Atrial fibrillation Father         Social History     Tobacco Use    Smoking status: Former     Current packs/day: 1.00     Average packs/day: 1 pack/day for 13.0 years (13.0 ttl pk-yrs)     Types: Cigarettes     Passive exposure: Past    Smokeless tobacco: Never    Tobacco comments:     quit in 1983   Substance Use Topics    Alcohol use: Not Currently     Comment: rare     Current Outpatient Medications   Medication Sig Dispense Refill    aspirin 81 MG EC tablet [ASPIRIN 81 MG EC TABLET] Take 81 mg by mouth daily.      blood glucose (ACCU-CHEK RENATE PLUS) test strip Use to test blood sugar once daily or as directed. 100 strip 3    blood-glucose meter Misc [BLOOD-GLUCOSE METER MISC] Use 1 Device As Directed daily. 1 each 0    cephALEXin (KEFLEX) 500 MG capsule Take 1 capsule (500 mg) by mouth 2 times daily. 6 capsule 0    cholecalciferol, vitamin D3, 50 mcg (2,000 unit) Tab [CHOLECALCIFEROL, VITAMIN D3, 50 MCG (2,000 UNIT) TAB] Take 2,000 Units by mouth daily.      furosemide (LASIX) 20 MG tablet Take 1 tablet (20 mg) by mouth daily. 5 tablet 0    generic lancets (FINGERSTIX LANCETS) [GENERIC LANCETS (FINGERSTIX LANCETS)] Dispense brand per patient's insurance at pharmacy discretion. 100 each 3    glipiZIDE (GLUCOTROL XL) 10 MG 24 hr tablet Take 1 tablet (10 mg) by mouth daily. 30 tablet 1    levothyroxine (SYNTHROID/LEVOTHROID) 175 MCG tablet Take 1 tablet (175 mcg) by mouth daily. 90 tablet 3    lisinopril-hydrochlorothiazide (ZESTORETIC) 10-12.5 MG tablet Take 1 tablet by mouth daily. 90 tablet 3    magnesium 250 MG tablet Take 1 tablet by mouth at bedtime      metFORMIN (GLUCOPHAGE) 500 MG tablet Take 2 tablets (1,000 mg) by mouth 2 times daily (with meals). 400 tablet 0    multivitamin, therapeutic (THERA-VIT) TABS tablet Take 1 tablet by mouth daily      rosuvastatin (CRESTOR) 10 MG tablet Take 1 tablet (10 mg) by mouth daily. 90 tablet 3    sulfamethoxazole-trimethoprim (BACTRIM DS) 800-160 MG tablet Take 1 tablet by mouth 2 times daily.  "20 tablet 0     No current facility-administered medications for this visit.       Objective    /64   Pulse 75   Temp 97.9  F (36.6  C) (Oral)   Resp 18   Ht 1.676 m (5' 6\")   Wt 108.9 kg (240 lb)   LMP  (LMP Unknown)   SpO2 98%   BMI 38.74 kg/m    Body mass index is 38.74 kg/m .  Physical Exam   General: Alert, oriented, no acute distress.    Respiratory: Easy work of breathing.   Cardiovascular: Appears warm and well-perfused. +2 right lower leg and pedal edema.   Skin: Redness extending to upper outlined borders to right medial lower leg. Redness, tenderness, warmth to dorsal lateral side of right foot- unchanged.   Neurologic: Mentation intact and speech normal.     Psychiatric: Appropriate affect.     Signed Electronically by: ASHLEY Morrow CNP    "

## 2025-04-02 PROBLEM — M54.50 LUMBAR SPINE PAIN: Status: RESOLVED | Noted: 2025-02-07 | Resolved: 2025-04-02

## 2025-04-06 ENCOUNTER — MYC MEDICAL ADVICE (OUTPATIENT)
Dept: FAMILY MEDICINE | Facility: CLINIC | Age: 70
End: 2025-04-06
Payer: COMMERCIAL

## 2025-04-07 ENCOUNTER — PATIENT OUTREACH (OUTPATIENT)
Dept: CARE COORDINATION | Facility: CLINIC | Age: 70
End: 2025-04-07
Payer: COMMERCIAL

## 2025-04-10 DIAGNOSIS — E11.9 TYPE 2 DIABETES MELLITUS WITHOUT COMPLICATION, WITHOUT LONG-TERM CURRENT USE OF INSULIN (H): ICD-10-CM

## 2025-04-14 NOTE — PROGRESS NOTES
Korin Sharma is a 70 year old female who is being evaluated via a billable video visit.      How would you like to obtain your AVS? Ruzuku        Video Start Time: 9:21 AM      Video-Visit Details    Type of service:  Video Visit    Video End Time (time video stopped): 9:40 AM  Originating Location (pt. Location): Home    Distant Location (provider location):  [facility]     Platform used for Video Visit: [facility]    Assessment:     Diagnoses and all orders for this visit:  Acute left-sided low back pain without sciatica  -     Physical Therapy  Referral; Future  Closed compression fracture of L3 lumbar vertebra, sequela  -     Physical Therapy  Referral; Future  Injury caused by lifting, subsequent encounter  -     Physical Therapy  Referral; Future     Korin Sharma is a 70 year old y.o. female with past medical history significant for vitamin D deficiency, cholelithiasis, pancreatitis, hypothyroidism, diabetes mellitus, hypertension who presents today for follow-up regarding:    - Subacute benign and left low back pain post lifting injury 1/16/2025, improving     Plan:     A shared decision making plan was used. The patient's values and choices were respected. Prior medical records were reviewed today. The following represents what was discussed and decided upon by the provider and the patient.        -DIAGNOSTIC TESTS: Images were personally reviewed and interpreted.   -- No need for further imaging at this time as her x-ray was stable and her pain is improving.  Would need updated imaging if her pain worsens at any point however.  -- Right lower extremity ultrasound 3/20/2025 with no DVT noted.  --Lumbar spine x-ray 3/17/2025 with lumbosacral anatomy with partial sacralization of L5.  Mild compression deformity of L3, stable since prior x-ray.  --Lumbar spine x-ray 2/17/2025 with mild vertebral height loss of L3, age indeterminant.  -- Bone density  scan 12/31/2024 with normal bone density     -INTERVENTIONS: No injection recommendations at this time    -MEDICATIONS: No changes in medications  Discussed side effects of medications and proper use. Patient verbalized understanding.    -PHYSICAL THERAPY: Did place a referral for physical therapy at this point to work on reconditioning and strengthening around the L3 fracture.  Discussed the importance of core strengthening, ROM, stretching exercises with the patient and how each of these entities is important in decreasing pain.  Explained to the patient that the purpose of physical therapy is to teach the patient a home exercise program.  These exercises need to be performed every day in order to decrease pain and prevent future occurrences of pain.        -Did advise patient to start weaning off of wearing her back brace by removing it for 2 hours a day while she is up and moving however she should still limit repetitive bending and twisting and no lifting over 10 pounds.  If she is doing well for a couple of days at 2 hours off then she can go to 4 hours a day off for a couple of days if she is doing well at that she can go to 6 hours off for couple of days, then 8 hours a day for a couple of days and then at that point she can remove the brace completely.  -Restrictions for another 4 weeks while working with physical therapy.  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    -PATIENT EDUCATION:  Total time of 32 minutes, on the day of service, spent with the patient, reviewing the chart, placing orders, and documenting.     -FOLLOW UP:  follow-up in 4 weeks for video visit as she does live 45 minutes away  Advised to contact clinic if symptoms worsen or change.    Subjective:     Korin Sharma is a 70 year old female who presents today for follow-up regarding.  Low back pain ongoing since lifting injury at work as a   64 she has had   continued improvement over the last 4 months and currently her pain today is a 10 up to 2 at its worst with movement.  She notes that show she is getting in both her back and general health because she is limited her activity somewhat over the last couple months.  She currently denies any new symptoms.  Denies any radiating lower extremity pain.  Denies numbness or tingling sensations, stabbing or extremity weakness.  Denies any recent trips or falls.    *Work injury 1/16/2025.  Patient is a  was putting on a trailer and lifting the hitch to go over the ball, immediately noted severe pain.     -Treatment to Date: No prior spinal surgery or spinal injection  Physical therapy eval 2/6/2025 LBP     -Medications:  Tizanidine    Patient Active Problem List   Diagnosis    Obesity    Hypothyroidism    Cholelithiasis    Acute Pancreatitis    Menopause Has Occurred    Vitamin D Deficiency    Diabetes mellitus (H)    Benign essential hypertension    Hypertriglyceridemia    Prediabetes    Cellulitis       Current Outpatient Medications   Medication Sig Dispense Refill    aspirin 81 MG EC tablet [ASPIRIN 81 MG EC TABLET] Take 81 mg by mouth daily.      ibuprofen (ADVIL/MOTRIN) 200 MG tablet Take 200-400 mg by mouth.      blood glucose (ACCU-CHEK RENATE PLUS) test strip Use to test blood sugar once daily or as directed. 100 strip 3    blood-glucose meter Misc [BLOOD-GLUCOSE METER MISC] Use 1 Device As Directed daily. 1 each 0    calcium carbonate (TUMS) 500 MG chewable tablet Take 500 mg by mouth.      cholecalciferol, vitamin D3, 50 mcg (2,000 unit) Tab [CHOLECALCIFEROL, VITAMIN D3, 50 MCG (2,000 UNIT) TAB] Take 2,000 Units by mouth daily.      Ferrous Sulfate (IRON PO) Take 1 tablet by mouth daily.      furosemide (LASIX) 20 MG tablet Take 1 tablet (20 mg) by mouth daily. 5 tablet 0    generic lancets (FINGERSTIX LANCETS) [GENERIC LANCETS (FINGERSTIX LANCETS)] Dispense brand per patient's insurance at pharmacy  discretion. 100 each 3    glipiZIDE (GLUCOTROL XL) 10 MG 24 hr tablet Take 1 tablet (10 mg) by mouth daily. 30 tablet 1    levothyroxine (SYNTHROID/LEVOTHROID) 175 MCG tablet Take 1 tablet (175 mcg) by mouth daily. 90 tablet 3    lisinopril-hydrochlorothiazide (ZESTORETIC) 10-12.5 MG tablet Take 1 tablet by mouth daily. 90 tablet 3    magnesium 250 MG tablet Take 1 tablet by mouth at bedtime      metFORMIN (GLUCOPHAGE) 500 MG tablet TAKE 2 TABLETS BY MOUTH TWICE  DAILY WITH MEALS 400 tablet 2    multivitamin, therapeutic (THERA-VIT) TABS tablet Take 1 tablet by mouth daily      pioglitazone (ACTOS) 15 MG tablet Take 1 tablet (15 mg) by mouth daily. 30 tablet 1    rosuvastatin (CRESTOR) 10 MG tablet Take 1 tablet (10 mg) by mouth daily. 90 tablet 3     No current facility-administered medications for this visit.       No Known Allergies    History reviewed. No pertinent past medical history.     Review of Systems  ROS:  Specifically negative for bowel/bladder dysfunction, balance changes, headache, dizziness, foot drop, fevers, chills, appetite changes, nausea/vomiting, unexplained weight loss. Otherwise 13 systems reviewed are negative. Please see the patient's intake questionnaire from today for details.    Reviewed Social, Family, Past Medical and Past Surgical history with patient, no significant changes noted since prior visit.     Objective:     LMP  (LMP Unknown)     PHYSICAL EXAMINATION:    --CONSTITUTIONAL: Well developed, well nourished, healthy appearing individual.  --PSYCHIATRIC: Appropriate mood and affect. No difficulty interacting due to temper, social withdrawal, or memory issues.  --SKIN: Lumbar region is dry and intact.   --RESPIRATORY: Normal rhythm and effort. No abnormal accessory muscle breathing patterns noted.   --MUSCULOSKELETAL:  Normal lumbar lordosis noted, no lateral shift.  --GROSS MOTOR: Easily arises from a seated position. Gait is non-antalgic  --LUMBAR SPINE:  Inspection reveals no  evidence of deformity.     RESULTS:   Imaging: Spine imaging was reviewed today. The images were shown to the patient and the findings were explained using a spine model.      US Lower Extremity Venous Duplex Right  Result Date: 3/20/2025  EXAM: US LOWER EXTREMITY VENOUS DUPLEX RIGHT LOCATION: [facility] DATE: 3/20/2025 INDICATION:  Pain of right lower leg COMPARISON: None. TECHNIQUE: Venous Duplex ultrasound of the right lower extremity with and without compression, augmentation and duplex. Color flow and spectral Doppler with waveform analysis performed. FINDINGS: Exam includes the common femoral, femoral, popliteal, and contralateral common femoral veins as well as segmentally visualized deep calf veins and greater saphenous vein. RIGHT: No deep vein thrombosis. No superficial thrombophlebitis. No popliteal cyst.   IMPRESSION: 1.  No deep venous thrombosis in the right lower extremity.      XR Lumbar Spine 2/3 Views  Result Date: 3/17/2025  EXAM: XR LUMBAR SPINE 2/3 VIEWS LOCATION: [facility] DATE: 3/17/2025 INDICATION: Evaluate L3 compression fracture stability COMPARISON: Lumbar spine radiograph 02/17/2025.   IMPRESSION: Transitional lumbosacral anatomy, with partial sacralization of the L5 vertebral body. Mild compression deformity of the superior endplate of L3, not significantly changed in height loss from the prior radiograph. Remaining lumbar vertebral body heights appear maintained. Alignment of lumbar spine is normal. Moderate disc height loss at L4-L5. Facet hypertrophy in the lower lumbar spine. Surgical clips in right upper quadrant.

## 2025-04-15 ENCOUNTER — OFFICE VISIT (OUTPATIENT)
Dept: FAMILY MEDICINE | Facility: CLINIC | Age: 70
End: 2025-04-15
Payer: COMMERCIAL

## 2025-04-15 ENCOUNTER — VIRTUAL VISIT (OUTPATIENT)
Dept: PHYSICAL MEDICINE AND REHAB | Facility: CLINIC | Age: 70
End: 2025-04-15
Payer: COMMERCIAL

## 2025-04-15 VITALS
DIASTOLIC BLOOD PRESSURE: 75 MMHG | WEIGHT: 237 LBS | RESPIRATION RATE: 20 BRPM | HEART RATE: 63 BPM | BODY MASS INDEX: 38.09 KG/M2 | HEIGHT: 66 IN | SYSTOLIC BLOOD PRESSURE: 127 MMHG | TEMPERATURE: 97.5 F | OXYGEN SATURATION: 98 %

## 2025-04-15 DIAGNOSIS — E11.9 TYPE 2 DIABETES MELLITUS WITHOUT COMPLICATION, WITHOUT LONG-TERM CURRENT USE OF INSULIN (H): ICD-10-CM
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
## 2025-04-15 DIAGNOSIS — Z79.899 MEDICATION MANAGEMENT: ICD-10-CM

## 2025-04-15 DIAGNOSIS — R60.0 PERIPHERAL EDEMA: Primary | ICD-10-CM

## 2025-04-15 DIAGNOSIS — E66.812 CLASS 2 SEVERE OBESITY WITH BODY MASS INDEX (BMI) OF 35 TO 39.9 WITH SERIOUS COMORBIDITY (H): ICD-10-CM

## 2025-04-15 DIAGNOSIS — L03.115 CELLULITIS OF RIGHT LOWER EXTREMITY: ICD-10-CM

## 2025-04-15 DIAGNOSIS — S32.030S CLOSED COMPRESSION FRACTURE OF L3 LUMBAR VERTEBRA, SEQUELA: ICD-10-CM

## 2025-04-15 DIAGNOSIS — E66.01 CLASS 2 SEVERE OBESITY WITH BODY MASS INDEX (BMI) OF 35 TO 39.9 WITH SERIOUS COMORBIDITY (H): ICD-10-CM

## 2025-04-15 DIAGNOSIS — M54.50 ACUTE LEFT-SIDED LOW BACK PAIN WITHOUT SCIATICA: Primary | ICD-10-CM

## 2025-04-15 PROBLEM — L03.90 CELLULITIS: Status: ACTIVE | Noted: 2025-03-24

## 2025-04-15 LAB
ANION GAP SERPL CALCULATED.3IONS-SCNC: 12 MMOL/L (ref 7–15)
BUN SERPL-MCNC: 13.7 MG/DL (ref 8–23)
CALCIUM SERPL-MCNC: 9.3 MG/DL (ref 8.8–10.4)
CHLORIDE SERPL-SCNC: 101 MMOL/L (ref 98–107)
CREAT SERPL-MCNC: 0.78 MG/DL (ref 0.51–0.95)
EGFRCR SERPLBLD CKD-EPI 2021: 81 ML/MIN/1.73M2
ERYTHROCYTE [DISTWIDTH] IN BLOOD BY AUTOMATED COUNT: 13.2 % (ref 10–15)
GLUCOSE SERPL-MCNC: 153 MG/DL (ref 70–99)
HCO3 SERPL-SCNC: 27 MMOL/L (ref 22–29)
HCT VFR BLD AUTO: 37.7 % (ref 35–47)
HGB BLD-MCNC: 12.4 G/DL (ref 11.7–15.7)
MCH RBC QN AUTO: 30 PG (ref 26.5–33)
MCHC RBC AUTO-ENTMCNC: 32.9 G/DL (ref 31.5–36.5)
MCV RBC AUTO: 91 FL (ref 78–100)
PLATELET # BLD AUTO: 283 10E3/UL (ref 150–450)
POTASSIUM SERPL-SCNC: 4.1 MMOL/L (ref 3.4–5.3)
RBC # BLD AUTO: 4.14 10E6/UL (ref 3.8–5.2)
SODIUM SERPL-SCNC: 140 MMOL/L (ref 135–145)
WBC # BLD AUTO: 8.1 10E3/UL (ref 4–11)

## 2025-04-15 PROCEDURE — 85027 COMPLETE CBC AUTOMATED: CPT | Performed by: NURSE PRACTITIONER

## 2025-04-15 PROCEDURE — 3074F SYST BP LT 130 MM HG: CPT | Performed by: NURSE PRACTITIONER

## 2025-04-15 PROCEDURE — 80048 BASIC METABOLIC PNL TOTAL CA: CPT | Performed by: NURSE PRACTITIONER

## 2025-04-15 PROCEDURE — 99214 OFFICE O/P EST MOD 30 MIN: CPT | Performed by: NURSE PRACTITIONER

## 2025-04-15 PROCEDURE — 36415 COLL VENOUS BLD VENIPUNCTURE: CPT | Performed by: NURSE PRACTITIONER

## 2025-04-15 PROCEDURE — 1125F AMNT PAIN NOTED PAIN PRSNT: CPT | Performed by: NURSE PRACTITIONER

## 2025-04-15 PROCEDURE — 3078F DIAST BP <80 MM HG: CPT | Performed by: NURSE PRACTITIONER

## 2025-04-15 PROCEDURE — G2211 COMPLEX E/M VISIT ADD ON: HCPCS | Performed by: NURSE PRACTITIONER

## 2025-04-15 RX ORDER — FUROSEMIDE 20 MG/1
20 TABLET ORAL DAILY
Qty: 5 TABLET | Refills: 0 | Status: SHIPPED | OUTPATIENT
Start: 2025-04-15

## 2025-04-15 RX ORDER — CALCIUM CARBONATE 500 MG/1
500 TABLET, CHEWABLE ORAL
COMMUNITY

## 2025-04-15 RX ORDER — IBUPROFEN 200 MG
200-400 TABLET ORAL
COMMUNITY

## 2025-04-15 ASSESSMENT — PAIN SCALES - GENERAL
PAINLEVEL_OUTOF10: MILD PAIN (2)
PAINLEVEL_OUTOF10: NO PAIN (0)

## 2025-04-15 NOTE — LETTER
April 15, 2025      Korin Sharma  [address]  [facility] [place] [place] [address]        To Whom It May Concern:    Korin Sharma was seen in our clinic. She may return to work with the following: 10 pound lifting limit, limiting bending and twisting as tolerated.   Restrictions for 4 weeks. Will revisit further workability needs at that time.       Sincerely,    Rosita Waldrop CNP       Electronically signed

## 2025-04-15 NOTE — PROGRESS NOTES
Assessment and Plan:     Peripheral edema  Suspect that this is from not patient changing positions frequently.  I encourage her to elevate the legs when she is sitting or laying down.  Recommend compression socks.  Will treat with furosemide 20 mg for 5 days.  Educated on indications and side effects.  May consider 20 mg as needed in the future if this improves her symptoms.  - furosemide (LASIX) 20 MG tablet  Dispense: 5 tablet; Refill: 0    Cellulitis of right lower extremity  Will check hemogram to verify that cellulitis has not returned.  Suspect the patient has developed some venous stasis.  White blood count is elevated, will treat with antibiotic.  - CBC with platelets  - CBC with platelets    Type 2 diabetes mellitus without complication, without long-term current use of insulin (H)  Discussed initiating GLP-1 to assist with weight loss, but she declines.  She continues metformin, glipizide, and pioglitazone.    Class 2 severe obesity with BMI of 35 to 39.9 with serious comorbidity (H)  Recommend consuming a healthy diet and exercising.  This is contributing to diabetes.    Medication management  - Basic metabolic panel  (Ca, Cl, CO2, Creat, Gluc, K, Na, BUN)  - Basic metabolic panel  (Ca, Cl, CO2, Creat, Gluc, K, Na, BUN)    The longitudinal plan of care for the diagnosis(es)/condition(s) as documented were addressed during this visit. Due to the added complexity in care, I will continue to support Yady in the subsequent management and with ongoing continuity of care.      Subjective:     Korin is a 70 year old female presenting to the clinic for concerns for lower extremity edema.  Patient was hospitalized at [facility] on 3/24/2025.  Patient failed outpatient treatment for right lower extremity cellulitis.  She took cephalexin and Bactrim.  She was hospitalized where she received 1 dose of IV vancomycin overnight.  Erythema improved overnight.  She was discharged with oral Bactrim.  Labs on  3/25/2025 showed a white blood count of 6.1, creatinine of 1.03 and GFR 58.25, BNP was 31.  While she was hospitalized, she developed lower extremity edema.  This has been intermittent since and has worsened over the past few days.  Patient sustained a back injury and has limited mobility.  Patient tends to sit more during the day.  She is concerned as the leg is appearing erythematous again.  She denies pain.    Reviewof Systems: A complete 14 point review of systems was obtained and is negative or as stated in the history of present illness.    Social History     Socioeconomic History    Marital status: Single     Spouse name: Not on file    Number of children: Not on file    Years of education: Not on file    Highest education level: Not on file   Occupational History    Not on file   Tobacco Use    Smoking status: Former     Current packs/day: 1.00     Average packs/day: 1 pack/day for 13.0 years (13.0 ttl pk-yrs)     Types: Cigarettes     Passive exposure: Past    Smokeless tobacco: Never    Tobacco comments:     quit in 1983   Vaping Use    Vaping status: Never Used   Substance and Sexual Activity    Alcohol use: Not Currently     Comment: rare    Drug use: No    Sexual activity: Not on file   Other Topics Concern    Not on file   Social History Narrative    Not on file     Social Drivers of Health     Financial Resource Strain: Low Risk  (11/6/2024)    Financial Resource Strain     Within the past 12 months, have you or your family members you live with been unable to get utilities (heat, electricity) when it was really needed?: No   Food Insecurity: No Food Insecurity (3/24/2025)    Received from [facility]     Hunger Vital Sign     Worried About Running Out of Food in the Last Year: Never true     Ran Out of Food in the Last Year: Never true   Transportation Needs: No Transportation Needs (3/24/2025)    Received from [facility]     [facility]     Lack of Transportation  (Medical): No     Lack of Transportation (Non-Medical): No   Physical Activity: Unknown (11/6/2024)    Exercise Vital Sign     Days of Exercise per Week: 1 day     Minutes of Exercise per Session: Not on file   Stress: No Stress Concern Present (11/6/2024)    Citizen of the [place] of Occupational Health - Occupational Stress Questionnaire     Feeling of Stress : Only a little   Social Connections: Unknown (11/6/2024)    Social Connection and Isolation Panel [NHANES]     Frequency of Communication with Friends and Family: Not on file     Frequency of Social Gatherings with Friends and Family: Once a week     Attends Mu-ism Services: Not on file     Active Member of Clubs or Organizations: Not on file     Attends Club or Organization Meetings: Not on file     Marital Status: Not on file   Interpersonal Safety: Not At Risk (3/24/2025)    Received from [facility]     Humiliation, Afraid, Rape, and Kick questionnaire     Fear of Current or Ex-Partner: No     Emotionally Abused: No     Physically Abused: No     Sexually Abused: No   Housing Stability: Low Risk  (3/24/2025)    Received from [facility]     Housing Stability Vital Sign     Unable to Pay for Housing in the Last Year: No     Number of Times Moved in the Last Year: 0     Homeless in the Last Year: No       Active Ambulatory Problems     Diagnosis Date Noted    Obesity     Hypothyroidism     Cholelithiasis     Acute Pancreatitis     Menopause Has Occurred     Vitamin D Deficiency     Diabetes mellitus (H) 06/29/2015    Benign essential hypertension 11/15/2018    Hypertriglyceridemia 04/11/2019    Prediabetes 11/06/2023    Cellulitis 03/24/2025     Resolved Ambulatory Problems     Diagnosis Date Noted    Obesity (BMI 35.0-39.9) with comorbidity (H) 09/17/2018    Lumbar spine pain 02/07/2025     No Additional Past Medical History       Family History   Problem Relation Age of Onset    Heart Disease Mother     Diabetes Mother     Diabetes  "Father     Hypertension Father     Atrial fibrillation Father        Objective:     /75   Pulse 63   Temp 97.5  F (36.4  C)   Resp 20   Ht 1.676 m (5' 6\")   Wt 107.5 kg (237 lb)   LMP  (LMP Unknown)   SpO2 98%   BMI 38.25 kg/m      Patient is alert, in no obvious distress.   Skin: Warm, dry.    Lungs:  Clear to auscultation. Respirations even and unlabored.  No wheezing or rales noted.   Heart:  Regular rate and rhythm.  No murmurs, S3, S4, gallops, or rubs.    Musculoskeletal:  Full ROM of extremities.  +1 pitting edema is present in her bilateral lower extremities around her ankles and feet.  She has brownish discoloration bilaterally within her shins suspicious for venous stasis.  Mild erythema noted around the right medial malleolus.        Answers submitted by the patient for this visit:  General Questionnaire (Submitted on 4/15/2025)  Chief Complaint: Chronic problems general questions HPI Form  What is the reason for your visit today? : leg pain  How many servings of fruits and vegetables do you eat daily?: 0-1  On average, how many sweetened beverages do you drink each day (Examples: soda, juice, sweet tea, etc.  Do NOT count diet or artificially sweetened beverages)?: 0  How many minutes a day do you exercise enough to make your heart beat faster?: 9 or less  How many days a week do you exercise enough to make your heart beat faster?: 3 or less  How many days per week do you miss taking your medication?: 0  Questionnaire about: Chronic problems general questions HPI Form (Submitted on 4/15/2025)  Chief Complaint: Chronic problems general questions HPI Form    "

## 2025-04-15 NOTE — Clinical Note
"4/15/2025      Korin Sharma  [address]  [facility] [address]      Dear Colleague,    Thank you for referring your patient, Korin Sharma, to the [facility]. Please see a copy of my visit note below.    Korin Sharma is a 70 year old female who is being evaluated via a billable video visit.      How would you like to obtain your AVS? {AVS Preference:847146}    {If patient encounters technical issues they should call [phone] :[id number]}    Video Start Time: {video visit now:541785}      Video-Visit Details    Type of service:  Video Visit    Video End Time (time video stopped): {video visit now:152948}  Originating Location (pt. Location): {video visit pt location:[address]::\"Home\"}    Distant Location (provider location):  [facility] [place]     Platform used for Video Visit: {Virtual Visit Platforms:029949::\"Amoobi\"}    Assessment:     There are no diagnoses linked to this encounter.   Korin Sharma is a 70 year old y.o. female with past medical history significant for vitamin D deficiency, cholelithiasis, pancreatitis, hypothyroidism, diabetes mellitus, hypertension who presents today for follow-up regarding:    -***     Plan:     A shared decision making plan was used. The patient's values and choices were respected. Prior medical records were reviewed today. The following represents what was discussed and decided upon by the provider and the patient.        -DIAGNOSTIC TESTS: Images were personally reviewed and interpreted.   --***  -- Right lower extremity ultrasound 3/20/2025 with no DVT noted.  --Lumbar spine x-ray 3/17/2025 with lumbosacral anatomy with partial sacralization of L5.  Mild compression deformity of L3, stable since prior x-ray.  --Lumbar spine x-ray 2/17/2025 with mild vertebral height loss of L3, age indeterminant.  -- Bone density scan 12/31/2024 with normal bone density     -INTERVENTIONS: ***    -MEDICATIONS: *** "   Discussed side effects of medications and proper use. Patient verbalized understanding.    -PHYSICAL THERAPY: ***  Discussed the importance of core strengthening, ROM, stretching exercises with the patient and how each of these entities is important in decreasing pain.  Explained to the patient that the purpose of physical therapy is to teach the patient a home exercise program.  These exercises need to be performed every day in order to decrease pain and prevent future occurrences of pain.        -PATIENT EDUCATION:  Total time of *** minutes, on the day of service, spent with the patient, reviewing the chart, placing orders, and documenting.     -FOLLOW UP: ***   Advised to contact clinic if symptoms worsen or change.    Subjective:     Korin Sharma is a 70 year old female who presents today for follow-up regarding ***    *Work injury 1/16/2025.  Patient is a  was putting on a trailer and lifting the hitch to go over the ball, immediately noted severe pain.     -Treatment to Date: No prior spinal surgery or spinal injection  Physical therapy eval 2/6/2025 LBP     -Medications:  Tizanidine    Patient Active Problem List   Diagnosis    Obesity    Hypothyroidism    Cholelithiasis    Acute Pancreatitis    Menopause Has Occurred    Vitamin D Deficiency    Diabetes mellitus (H)    Benign essential hypertension    Hypertriglyceridemia    Prediabetes       Current Outpatient Medications   Medication Sig Dispense Refill    aspirin 81 MG EC tablet [ASPIRIN 81 MG EC TABLET] Take 81 mg by mouth daily.      blood glucose (ACCU-CHEK RENATE PLUS) test strip Use to test blood sugar once daily or as directed. 100 strip 3    blood-glucose meter Misc [BLOOD-GLUCOSE METER MISC] Use 1 Device As Directed daily. 1 each 0    cholecalciferol, vitamin D3, 50 mcg (2,000 unit) Tab [CHOLECALCIFEROL, VITAMIN D3, 50 MCG (2,000 UNIT) TAB] Take 2,000 Units by mouth daily.      furosemide (LASIX) 20 MG tablet Take 1 tablet (20 mg) by  mouth daily. 5 tablet 0    generic lancets (FINGERSTIX LANCETS) [GENERIC LANCETS (FINGERSTIX LANCETS)] Dispense brand per patient's insurance at pharmacy discretion. 100 each 3    glipiZIDE (GLUCOTROL XL) 10 MG 24 hr tablet Take 1 tablet (10 mg) by mouth daily. 30 tablet 1    levothyroxine (SYNTHROID/LEVOTHROID) 175 MCG tablet Take 1 tablet (175 mcg) by mouth daily. 90 tablet 3    lisinopril-hydrochlorothiazide (ZESTORETIC) 10-12.5 MG tablet Take 1 tablet by mouth daily. 90 tablet 3    magnesium 250 MG tablet Take 1 tablet by mouth at bedtime      metFORMIN (GLUCOPHAGE) 500 MG tablet TAKE 2 TABLETS BY MOUTH TWICE  DAILY WITH MEALS 400 tablet 2    multivitamin, therapeutic (THERA-VIT) TABS tablet Take 1 tablet by mouth daily      pioglitazone (ACTOS) 15 MG tablet Take 1 tablet (15 mg) by mouth daily. 30 tablet 1    rosuvastatin (CRESTOR) 10 MG tablet Take 1 tablet (10 mg) by mouth daily. 90 tablet 3     No current facility-administered medications for this visit.       No Known Allergies    No past medical history on file.     Review of Systems  ROS: ***  Specifically negative for bowel/bladder dysfunction, balance changes, headache, dizziness, foot drop, fevers, chills, appetite changes, nausea/vomiting, unexplained weight loss. Otherwise 13 systems reviewed are negative. Please see the patient's intake questionnaire from today for details.    Reviewed Social, Family, Past Medical and Past Surgical history with patient, no significant changes noted since prior visit.     Objective:     LMP  (LMP Unknown)     PHYSICAL EXAMINATION:    --CONSTITUTIONAL: Well developed, well nourished, healthy appearing individual.  --PSYCHIATRIC: Appropriate mood and affect. No difficulty interacting due to temper, social withdrawal, or memory issues.  --SKIN: Lumbar region is dry and intact.   --RESPIRATORY: Normal rhythm and effort. No abnormal accessory muscle breathing patterns noted.   --MUSCULOSKELETAL:  Normal lumbar lordosis  noted, no lateral shift.  --GROSS MOTOR: Easily arises from a seated position. Gait is non-antalgic  --LUMBAR SPINE:  Inspection reveals no evidence of deformity. Range of motion is not limited in lumbar flexion, extension, lateral rotation. No tenderness to palpation lumbar spine. Straight leg raising is negative to radicular pain. Sciatic notch non-tender.   --SACROILIAC JOINT: *** Distraction. *** Marcela's with reproduction of pain to affected extremity. *** Gaenslen's Test with reproduction of pain to affected extremity. Posterior Pelvic Pain Provocative Test ***. Sacroiliac Joint Compression Test ***. Sacral Thrust/Yeoman's Test ***.  --LOWER EXTREMITY MOTOR TESTING:  Plantar flexion left 5/5, right 5/5   Dorsiflexion left 5/5, right 5/5   Great toe MTP extension left 5/5, right 5/5  Knee flexion left 5/5, right 5/5  Knee extension left 5/5, right 5/5   Hip flexion left 5/5, right 5/5  Hip abduction left 5/5, right 5/5  Hip adduction left 5/5, right 5/5   --HIPS: Full range of motion bilaterally.   --NEUROLOGIC: Bilateral patellar and achilles reflexes are physiologic and symmetric. Sensation to light touch is intact in the bilateral L4, L5, and S1 dermatomes.    RESULTS:   Imaging: Spine imaging was reviewed today. The images were shown to the patient and the findings were explained using a spine model.    US Lower Extremity Venous Duplex Right    Result Date: 3/20/2025  EXAM: US LOWER EXTREMITY VENOUS DUPLEX RIGHT LOCATION: [facility] DATE: 3/20/2025 INDICATION:  Pain of right lower leg COMPARISON: None. TECHNIQUE: Venous Duplex ultrasound of the right lower extremity with and without compression, augmentation and duplex. Color flow and spectral Doppler with waveform analysis performed. FINDINGS: Exam includes the common femoral, femoral, popliteal, and contralateral common femoral veins as well as segmentally visualized deep calf veins and greater saphenous vein. RIGHT: No deep vein  thrombosis. No superficial thrombophlebitis. No popliteal cyst.     IMPRESSION: 1.  No deep venous thrombosis in the right lower extremity.    XR Lumbar Spine 2/3 Views    Result Date: 3/17/2025  EXAM: XR LUMBAR SPINE 2/3 VIEWS LOCATION: [facility] DATE: 3/17/2025 INDICATION: Evaluate L3 compression fracture stability COMPARISON: Lumbar spine radiograph 02/17/2025.     IMPRESSION: Transitional lumbosacral anatomy, with partial sacralization of the L5 vertebral body. Mild compression deformity of the superior endplate of L3, not significantly changed in height loss from the prior radiograph. Remaining lumbar vertebral body heights appear maintained. Alignment of lumbar spine is normal. Moderate disc height loss at L4-L5. Facet hypertrophy in the lower lumbar spine. Surgical clips in right upper quadrant.                          Korin Sharma is a 70 year old female who is being evaluated via a billable video visit.      How would you like to obtain your AVS? Hyper [employer] Level User [place]    {If patient encounters technical issues they should call [phone] :[id number]}    Video Start Time: 9:21 AM      Video-Visit Details    Type of service:  Video Visit    Video End Time (time video stopped): {video visit now:144934}  Originating Location (pt. Location): Home    Distant Location (provider location):  [facility]     [facility]    Assessment:     Diagnoses and all orders for this visit:  Acute left-sided low back pain without sciatica     Korin Sharma is a 70 year old y.o. female with past medical history significant for vitamin D deficiency, cholelithiasis, pancreatitis, hypothyroidism, diabetes mellitus, hypertension who presents today for follow-up regarding:    -***     Plan:     A shared decision making plan was used. The patient's values and choices were respected. Prior medical records were reviewed today. The following represents what was  discussed and decided upon by the provider and the patient.        -DIAGNOSTIC TESTS: Images were personally reviewed and interpreted.   --***  -- Right lower extremity ultrasound 3/20/2025 with no DVT noted.  --Lumbar spine x-ray 3/17/2025 with lumbosacral anatomy with partial sacralization of L5.  Mild compression deformity of L3, stable since prior x-ray.  --Lumbar spine x-ray 2/17/2025 with mild vertebral height loss of L3, age indeterminant.  -- Bone density scan 12/31/2024 with normal bone density     -INTERVENTIONS: ***    -MEDICATIONS: ***   Discussed side effects of medications and proper use. Patient verbalized understanding.    -PHYSICAL THERAPY: ***  Discussed the importance of core strengthening, ROM, stretching exercises with the patient and how each of these entities is important in decreasing pain.  Explained to the patient that the purpose of physical therapy is to teach the patient a home exercise program.  These exercises need to be performed every day in order to decrease pain and prevent future occurrences of pain.        -PATIENT EDUCATION:  Total time of *** minutes, on the day of service, spent with the patient, reviewing the chart, placing orders, and documenting.     -FOLLOW UP: ***   Advised to contact clinic if symptoms worsen or change.    Subjective:     Korin Sharma is a 70 year old female who presents today for follow-up regarding ***    *Work injury 1/16/2025.  Patient is a  was putting on a trailer and lifting the hitch to go over the ball, immediately noted severe pain.     -Treatment to Date: No prior spinal surgery or spinal injection  Physical therapy eval 2/6/2025 LBP     -Medications:  Tizanidine    Patient Active Problem List   Diagnosis     Obesity     Hypothyroidism     Cholelithiasis     Acute Pancreatitis     Menopause Has Occurred     Vitamin D Deficiency     Diabetes mellitus (H)     Benign essential hypertension     Hypertriglyceridemia     Prediabetes      Cellulitis       Current Outpatient Medications   Medication Sig Dispense Refill     aspirin 81 MG EC tablet [ASPIRIN 81 MG EC TABLET] Take 81 mg by mouth daily.       ibuprofen (ADVIL/MOTRIN) 200 MG tablet Take 200-400 mg by mouth.       blood glucose (ACCU-CHEK RENATE PLUS) test strip Use to test blood sugar once daily or as directed. 100 strip 3     blood-glucose meter Misc [BLOOD-GLUCOSE METER MISC] Use 1 Device As Directed daily. 1 each 0     calcium carbonate (TUMS) 500 MG chewable tablet Take 500 mg by mouth.       cholecalciferol, vitamin D3, 50 mcg (2,000 unit) Tab [CHOLECALCIFEROL, VITAMIN D3, 50 MCG (2,000 UNIT) TAB] Take 2,000 Units by mouth daily.       Ferrous Sulfate (IRON PO) Take 1 tablet by mouth daily.       furosemide (LASIX) 20 MG tablet Take 1 tablet (20 mg) by mouth daily. 5 tablet 0     generic lancets (FINGERSTIX LANCETS) [GENERIC LANCETS (FINGERSTIX LANCETS)] Dispense brand per patient's insurance at pharmacy discretion. 100 each 3     glipiZIDE (GLUCOTROL XL) 10 MG 24 hr tablet Take 1 tablet (10 mg) by mouth daily. 30 tablet 1     levothyroxine (SYNTHROID/LEVOTHROID) 175 MCG tablet Take 1 tablet (175 mcg) by mouth daily. 90 tablet 3     lisinopril-hydrochlorothiazide (ZESTORETIC) 10-12.5 MG tablet Take 1 tablet by mouth daily. 90 tablet 3     magnesium 250 MG tablet Take 1 tablet by mouth at bedtime       metFORMIN (GLUCOPHAGE) 500 MG tablet TAKE 2 TABLETS BY MOUTH TWICE  DAILY WITH MEALS 400 tablet 2     multivitamin, therapeutic (THERA-VIT) TABS tablet Take 1 tablet by mouth daily       pioglitazone (ACTOS) 15 MG tablet Take 1 tablet (15 mg) by mouth daily. 30 tablet 1     rosuvastatin (CRESTOR) 10 MG tablet Take 1 tablet (10 mg) by mouth daily. 90 tablet 3     No current facility-administered medications for this visit.       No Known Allergies    No past medical history on file.     Review of Systems  ROS: ***  Specifically negative for bowel/bladder dysfunction, balance changes,  headache, dizziness, foot drop, fevers, chills, appetite changes, nausea/vomiting, unexplained weight loss. Otherwise 13 systems reviewed are negative. Please see the patient's intake questionnaire from today for details.    Reviewed Social, Family, Past Medical and Past Surgical history with patient, no significant changes noted since prior visit.     Objective:     LMP  (LMP Unknown)     PHYSICAL EXAMINATION:    --CONSTITUTIONAL: Well developed, well nourished, healthy appearing individual.  --PSYCHIATRIC: Appropriate mood and affect. No difficulty interacting due to temper, social withdrawal, or memory issues.  --SKIN: Lumbar region is dry and intact.   --RESPIRATORY: Normal rhythm and effort. No abnormal accessory muscle breathing patterns noted.   --MUSCULOSKELETAL:  Normal lumbar lordosis noted, no lateral shift.  --GROSS MOTOR: Easily arises from a seated position. Gait is non-antalgic  --LUMBAR SPINE:  Inspection reveals no evidence of deformity. Range of motion is not limited in lumbar flexion, extension, lateral rotation. No tenderness to palpation lumbar spine. Straight leg raising is negative to radicular pain. Sciatic notch non-tender.   --SACROILIAC JOINT: *** Distraction. *** Marcela's with reproduction of pain to affected extremity. *** Gaenslen's Test with reproduction of pain to affected extremity. Posterior Pelvic Pain Provocative Test ***. Sacroiliac Joint Compression Test ***. Sacral Thrust/Yeoman's Test ***.  --LOWER EXTREMITY MOTOR TESTING:  Plantar flexion left 5/5, right 5/5   Dorsiflexion left 5/5, right 5/5   Great toe MTP extension left 5/5, right 5/5  Knee flexion left 5/5, right 5/5  Knee extension left 5/5, right 5/5   Hip flexion left 5/5, right 5/5  Hip abduction left 5/5, right 5/5  Hip adduction left 5/5, right 5/5   --HIPS: Full range of motion bilaterally.   --NEUROLOGIC: Bilateral patellar and achilles reflexes are physiologic and symmetric. Sensation to light touch is intact in  the bilateral L4, L5, and S1 dermatomes.    RESULTS:   Imaging: Spine imaging was reviewed today. The images were shown to the patient and the findings were explained using a spine model.    US Lower Extremity Venous Duplex Right    Result Date: 3/20/2025  EXAM: US LOWER EXTREMITY VENOUS DUPLEX RIGHT LOCATION: [facility] DATE: 3/20/2025 INDICATION:  Pain of right lower leg COMPARISON: None. TECHNIQUE: Venous Duplex ultrasound of the right lower extremity with and without compression, augmentation and duplex. Color flow and spectral Doppler with waveform analysis performed. FINDINGS: Exam includes the common femoral, femoral, popliteal, and contralateral common femoral veins as well as segmentally visualized deep calf veins and greater saphenous vein. RIGHT: No deep vein thrombosis. No superficial thrombophlebitis. No popliteal cyst.     IMPRESSION: 1.  No deep venous thrombosis in the right lower extremity.    XR Lumbar Spine 2/3 Views    Result Date: 3/17/2025  EXAM: XR LUMBAR SPINE 2/3 VIEWS LOCATION: [facility] DATE: 3/17/2025 INDICATION: Evaluate L3 compression fracture stability COMPARISON: Lumbar spine radiograph 02/17/2025.     IMPRESSION: Transitional lumbosacral anatomy, with partial sacralization of the L5 vertebral body. Mild compression deformity of the superior endplate of L3, not significantly changed in height loss from the prior radiograph. Remaining lumbar vertebral body heights appear maintained. Alignment of lumbar spine is normal. Moderate disc height loss at L4-L5. Facet hypertrophy in the lower lumbar spine. Surgical clips in right upper quadrant.                            Again, thank you for allowing me to participate in the care of your patient.        Sincerely,        Rosita Waldrop CNP    Electronically signed

## 2025-04-15 NOTE — PATIENT INSTRUCTIONS
~Spine Center Scheduling #[phone].  ~Please call our [facility] Spine Nurse Navigation #[phone] with any questions or concerns about your treatment plan, if symptoms worsen and you would like to be seen urgently, or if you have problems controlling bladder and bowel function.  ~For any future flareups or new symptoms, recommend follow-up in clinic or contact the nurse navigator line.  ~Please note that any My Chart messages may take multiple days for a response due to the high volume of patients seen in clinic.  Anything sent Thursday night or after will be answered the following week when able, as Rosita Waldrop CNP does not work in clinic on Fridays.   ~Rosita Waldrop CNP is at the [facility] on Tuesdays, otherwise primarily at the [facility].       Importance of specialized Physical Therapy: Discussed the importance of core strengthening, ROM, stretching exercises with the patient and how each of these entities is important in decreasing pain.  Explained to the patient that the purpose of physical therapy is to teach the patient a home exercise program individualized to them and their specific health concerns.  These exercises need to be performed every day in order to decrease pain and prevent future occurrences of pain.

## 2025-04-24 DIAGNOSIS — R60.0 PERIPHERAL EDEMA: ICD-10-CM

## 2025-04-24 RX ORDER — FUROSEMIDE 20 MG/1
20 TABLET ORAL DAILY
Qty: 30 TABLET | Refills: 2 | Status: SHIPPED | OUTPATIENT
Start: 2025-04-24

## 2025-04-29 ENCOUNTER — THERAPY VISIT (OUTPATIENT)
Age: 70
End: 2025-04-29
Payer: OTHER MISCELLANEOUS

## 2025-04-29 DIAGNOSIS — M54.50 LUMBAR PAIN: Primary | ICD-10-CM

## 2025-04-29 DIAGNOSIS — M54.50 LUMBAR SPINE PAIN: ICD-10-CM

## 2025-04-29 PROBLEM — G89.29 CHRONIC BILATERAL LOW BACK PAIN WITHOUT SCIATICA: Status: ACTIVE | Noted: 2025-02-07

## 2025-04-29 PROCEDURE — 97110 THERAPEUTIC EXERCISES: CPT | Mod: GP | Performed by: PHYSICAL THERAPIST

## 2025-04-29 PROCEDURE — 97161 PT EVAL LOW COMPLEX 20 MIN: CPT | Mod: GP | Performed by: PHYSICAL THERAPIST

## 2025-04-29 NOTE — PROGRESS NOTES
PHYSICAL THERAPY EVALUATION  Type of Visit: Evaluation     Yady presents today with history of L3 compression fracture. Her injury occurred 1/16/25. She has been weaning off her back brace slowly. She does not wear it when she is sleeping. She has woken up at night due to pain in the past couple weeks only a couple of times. When she wakes up with pain if she walks a bit the pain will go away. Yady is on a 10# weight restriction and to avoid bending and twisting.     Fall Risk Screen:  Have you fallen 2 or more times in the past year?: No  Have you fallen and had an injury in the past year?: No    Subjective         Presenting condition or subjective complaint: Compression fracture L3  Date of onset: 01/16/25    Relevant medical history:     Dates & types of surgery:      Prior diagnostic imaging/testing results: X-ray     Prior therapy history for the same diagnosis, illness or injury: Yes PT in January    Living Environment  Social support: With a significant other or spouse   Type of home: House; 2-story   Stairs to enter the home: Yes 3 Is there a railing: Yes     Ramp: No   Stairs inside the home: Yes 16 Is there a railing: Yes     Help at home: Home management tasks (cooking, cleaning); Home and Yard maintenance tasks; Assist for driving and community activities  Equipment owned:       Employment: Yes   Hobbies/Interests: Lots  Walking, climbing in/out of bus, going up/down stairs   Patient goals for therapy: My job and hobbies     Objective   LUMBAR SPINE EVALUATION  PAIN: Pain Level at Rest: 1/10  Pain Level with Use: 4/10  Pain Location: bilateral low back pain  Pain Quality: achy, burning in back when active, does not travel down  Pain Frequency: intermittent  Pain is Worst: mostly during the day  Pain is Exacerbated By: bending, twisting  Pain is Relieved By: rest, walking  Pain Progression: Improved  POSTURE: WNL  GAIT:   Weightbearing Status: WBAT  Assistive Device(s): None  Gait  Deviations: WNL  STRENGTH:  hip flexion: 5-/5 bilaterally, knee extension: L: 5/5, R: 5/5, knee flexion: L: 5/5 pulling low back, R: 5/5 pulling low back  FLEXIBILITY:  tightness hamstrings bilaterally    Assessment & Plan   CLINICAL IMPRESSIONS  Medical Diagnosis: compressure fracture L3    Treatment Diagnosis: low back pain   Impression/Assessment: Patient is a 70 year old female with LBP complaints.  The following significant findings have been identified: Pain, Decreased ROM/flexibility, Decreased strength, Impaired muscle performance, and Decreased activity tolerance. These impairments interfere with their ability to perform self care tasks, work tasks, recreational activities, household chores, driving , household mobility, and community mobility as compared to previous level of function.     Clinical Decision Making (Complexity):  Clinical Presentation: Stable/Uncomplicated  Clinical Presentation Rationale: based on medical and personal factors listed in PT evaluation  Clinical Decision Making (Complexity): Low complexity    PLAN OF CARE  Treatment Interventions:  Modalities: Biofeedback, Cryotherapy, Cupping, Dry Needling, E-stim, Hot Pack  Interventions: Gait Training, Manual Therapy, Neuromuscular Re-education, Therapeutic Activity, Therapeutic Exercise, Self-Care/Home Management    Long Term Goals     PT Goal 1  Goal Description: Pt will be able to  25# object off of the ground with 0/10p.  Rationale: to maximize safety and independence with performance of ADLs and functional tasks  Target Date: 07/22/25  PT Goal 2  Goal Description: Pt will be able to sit in the car for her job for 2 hours with no greater than 1/10 pain.  Rationale: to maximize safety and independence with performance of ADLs and functional tasks  Target Date: 07/22/25      Frequency of Treatment: every 1-2 weeks  Duration of Treatment: 12 weeks    Education Assessment:   Learner/Method: Patient    Risks and benefits of  evaluation/treatment have been explained.   Patient/Family/caregiver agrees with Plan of Care.     Evaluation Time:     PT Milind, Low Complexity Minutes (36370): 25     Signing Clinician: Rosanne Casarez PT

## 2025-05-07 NOTE — PROGRESS NOTES
Medication Therapy Management (MTM) Encounter    ASSESSMENT:                            Medication Adherence/Access: No issues identified.    Diabetes   Patient is not meeting A1c goal of < 7%. Self monitoring of blood glucose is not at goal of fasting  mg/dL though has improved with dose increase of glipizide and potentially due to decrease in inflammation and stress she has been under with her recent back fracture.  Concern for side effects of pioglitazone contributing to lower extremity edema, discussed with patient today.  Opted to continue current dose of pioglitazone and monitor for change in edema with addition of furosemide.    Hypertension   Patient is meeting blood pressure goal of < 130/80mmHg per last clinic blood pressure.  Will continue to monitor with addition of furosemide for management of edema.    PLAN:                            No medication changes today - continue to monitor for improvement in swelling with continuation of furosemide      Continue to monitor blood sugars at home.    Follow-up: Return in 4 weeks (on 6/5/2025) for Follow up, with me, using a phone visit.    If continuing furosemide scheduled then repeat BMP to monitor electrolytes and kidney function.   Will discuss deprescribing aspirin with patient at next visit considering age and primary prevention status.    SUBJECTIVE/OBJECTIVE:                          Yady Sharma is a 70 year old female seen for a follow-up visit.       Reason for visit: diabetes.    Allergies/ADRs: Reviewed in chart  Past Medical History: Reviewed in chart  Tobacco: She reports that she has quit smoking. Her smoking use included cigarettes. She has a 13 pack-year smoking history. She has been exposed to tobacco smoke. She has never used smokeless tobacco.  Alcohol:  not currently using     Medication Adherence/Access: no issues reported.    Diabetes   pioglitazone 15mg daily - started at last MTM visit   Metformin 1000mg twice daily   Glipizide XL  10mg daily   Aspirin 81mg daily - per chart review, for primary prevention   Side effects? Bilateral lower extremity edema - has worsened in the last month, patient was hospitalized at the end of March for this, diagnosed with cellulitis, had intermittently edema since discharge, saw primary care provider who started a short course of furosemide which was helpful therefore recommended to continue with daily dosing       Working on diet - more veggies, rice and potatoes limited to several times per week, not using real sugar  Physical activity is limited by back pain due to fracture, pain in her leg following resolution in cellulitis  Patient not interested in GLP-1, SGLT2 inhibitor due to cost-discussed option of [place]ow through [facility] cost plus drugs though patient declined as she does not want to spend any additional money on her medications.    Medication History:  Ozempic - limited by cost so was working with patient assistance program, noted some side effects, patient not interested in restarting since she does not like what she has been hearing about it, also issues with getting consistent supply through patient assistance program   Jardiance - limited by cost, copay of $400 at the end 2024     Blood sugar monitorin time(s) daily; Ranges: (patient reported) Fasting- 150s-170s   Eye exam in the last 12 months? No  Foot exam is up to date    Hypertension   Lisinopril-hydrochlorothiazide 10-12.5mg daily   Furosemide 20mg daily - started by primary care provider for edema management, was only able to start a few days ago so not able to report benefit yet, denies any issues so far   Patient reports no current medication side effects  Patient does not self-monitor blood pressure.      BP Readings from Last 3 Encounters:   04/15/25 127/75   25 122/64   25 138/72             Today's Vitals: LMP  (LMP Unknown)   ----------------      I spent 10 minutes with this patient today. All changes  were made via collaborative practice agreement with ASHLEY Chavez CNP.     A summary of these recommendations was sent via [employer].    Robert AlcantarD  Medication Therapy Management (MTM) Pharmacist   [facility]    Telemedicine Visit Details  The patient's medications can be safely assessed via a telemedicine encounter.  Type of service:  Telephone visit  Originating Location (pt. Location): Home    Distant Location (provider location):  On-site  Start Time: 10:40 am  End Time: 10:50 AM     Medication Therapy Recommendations  No medication therapy recommendations to display

## 2025-05-08 ENCOUNTER — TELEPHONE (OUTPATIENT)
Dept: FAMILY MEDICINE | Facility: CLINIC | Age: 70
End: 2025-05-08
Payer: COMMERCIAL

## 2025-05-08 ENCOUNTER — VIRTUAL VISIT (OUTPATIENT)
Dept: PHARMACY | Facility: CLINIC | Age: 70
End: 2025-05-08
Payer: COMMERCIAL

## 2025-05-08 DIAGNOSIS — I10 BENIGN ESSENTIAL HYPERTENSION: ICD-10-CM

## 2025-05-08 DIAGNOSIS — E11.9 TYPE 2 DIABETES MELLITUS WITHOUT COMPLICATION, WITHOUT LONG-TERM CURRENT USE OF INSULIN (H): Primary | ICD-10-CM

## 2025-05-08 PROCEDURE — 99207 PR NO CHARGE LOS: CPT | Mod: 93 | Performed by: PHARMACIST

## 2025-05-12 NOTE — PROGRESS NOTES
Korin Sharma is a 70 year old female who is being evaluated via a billable video visit.      How would you like to obtain your AVS? [employer]          Video-Visit Details    Type of service:  Video Visit    Originating Location (pt. Location): Home    [facility]     [facility]    Assessment:     Diagnoses and all orders for this visit:  Acute left-sided low back pain without sciatica  -     Physical Therapy  Referral; Future  Closed compression fracture of L3 lumbar vertebra, sequela  -     XR Lumbar Spine 2/3 Views; Future  -     Physical Therapy  Referral; Future  Injury caused by lifting, subsequent encounter  -     Physical Therapy  Referral; Future     Korin Sharma is a 70 year old y.o. female with past medical history significant for vitamin D deficiency, cholelithiasis, pancreatitis, hypothyroidism, diabetes mellitus, hypertension who presents today for follow-up regarding:    -Ongoing midline low back pain, improving     Plan:     A shared decision making plan was used. The patient's values and choices were respected. Prior medical records were reviewed today. The following represents what was discussed and decided upon by the provider and the patient.        -DIAGNOSTIC TESTS: Images were personally reviewed and interpreted.   --Ordered updated Xray to eval L3 compression fracture  --Right lower extremity ultrasound 3/20/2025 with no DVT noted.  --Lumbar spine x-ray 3/17/2025 with lumbosacral anatomy with partial sacralization of L5.  Mild compression deformity of L3, stable since prior x-ray.  --Lumbar spine x-ray 2/17/2025 with mild vertebral height loss of L3, age indeterminant.  --Bone density scan 12/31/2024 with normal bone density     -INTERVENTIONS: No recommendations for injections at this time.    -MEDICATIONS: No changes in medications  Discussed side effects of  medications and proper use. Patient verbalized understanding.    -PHYSICAL THERAPY: New referral to physical therapy placed again.  Discussed the importance of core strengthening, ROM, stretching exercises with the patient and how each of these entities is important in decreasing pain.  Explained to the patient that the purpose of physical therapy is to teach the patient a home exercise program.  These exercises need to be performed every day in order to decrease pain and prevent future occurrences of pain.        -Restrictions increased to 15 pound lifting limit.    -PATIENT EDUCATION:  Total time of 36 minutes, on the day of service, spent with the patient, reviewing the chart, placing orders, and documenting.   -Today we also discussed the issues related to the current COVID-19 pandemic, the pros and cons of the current treatment plan, the CDC guidelines such as social distancing, washing the hands, and covering the cough.    -FOLLOW UP: Follow-up in 4 weeks, sooner if needed  Advised to contact clinic if symptoms worsen or change.    Subjective:     Korin Sharma is a 70 year old female who presents today for follow-up regarding ongoing mid lower back pain that is improving with physical therapy and time.  She has been off her brace now for the last 1 week and doing well with this.  Currently pain is a 1/10.  She does report some increased pain with prolonged sitting and standing and does have some occasional pinpoint pain but the majority of her pain feels more like muscle conditioning since she is working on strengthening right now with PT.  Otherwise denies any radiating lower extremity pain.  Denies lower extremity weakness.    *Work injury 1/16/2025.  Patient is a  was putting on a trailer and lifting the hitch to go over the ball, immediately noted severe pain.    Patient's STEVEN Eric was present on the phone during the visit today.  Work note faxed to [phone].     -Treatment to Date: No  prior spinal surgery or spinal injection  Physical therapy February through May 2025 [facility]     -Medications:  Tizanidine    Patient Active Problem List   Diagnosis    Class 2 severe obesity with body mass index (BMI) of 35 to 39.9 with serious comorbidity (H)    Hypothyroidism    Cholelithiasis    Acute Pancreatitis    Menopause Has Occurred    Vitamin D Deficiency    Diabetes mellitus (H)    Benign essential hypertension    Hypertriglyceridemia    Prediabetes    Chronic bilateral low back pain without sciatica    Cellulitis       Current Outpatient Medications   Medication Sig Dispense Refill    aspirin 81 MG EC tablet [ASPIRIN 81 MG EC TABLET] Take 81 mg by mouth daily.      ibuprofen (ADVIL/MOTRIN) 200 MG tablet Take 200-400 mg by mouth every 6 hours as needed for inflammatory pain.      blood glucose (ACCU-CHEK RENATE PLUS) test strip Use to test blood sugar once daily or as directed. 100 strip 3    blood-glucose meter Misc [BLOOD-GLUCOSE METER MISC] Use 1 Device As Directed daily. 1 each 0    calcium carbonate (TUMS) 500 MG chewable tablet Take 500 mg by mouth daily as needed for heartburn.      cholecalciferol, vitamin D3, 50 mcg (2,000 unit) Tab [CHOLECALCIFEROL, VITAMIN D3, 50 MCG (2,000 UNIT) TAB] Take 2,000 Units by mouth daily.      Ferrous Sulfate (IRON PO) Take 1 tablet by mouth daily.      furosemide (LASIX) 20 MG tablet Take 1 tablet (20 mg) by mouth daily. 30 tablet 2    generic lancets (FINGERSTIX LANCETS) [GENERIC LANCETS (FINGERSTIX LANCETS)] Dispense brand per patient's insurance at pharmacy discretion. 100 each 3    glipiZIDE (GLUCOTROL XL) 10 MG 24 hr tablet Take 1 tablet (10 mg) by mouth daily. 30 tablet 1    levothyroxine (SYNTHROID/LEVOTHROID) 175 MCG tablet Take 1 tablet (175 mcg) by mouth daily. 90 tablet 3    lisinopril-hydrochlorothiazide (ZESTORETIC) 10-12.5 MG tablet Take 1 tablet by mouth daily. 90 tablet 3    magnesium 250 MG tablet Take 1 tablet by mouth at bedtime      metFORMIN  (GLUCOPHAGE) 500 MG tablet TAKE 2 TABLETS BY MOUTH TWICE  DAILY WITH MEALS 400 tablet 2    multivitamin, therapeutic (THERA-VIT) TABS tablet Take 1 tablet by mouth daily      pioglitazone (ACTOS) 15 MG tablet Take 1 tablet (15 mg) by mouth daily. 30 tablet 1    rosuvastatin (CRESTOR) 10 MG tablet Take 1 tablet (10 mg) by mouth daily. 90 tablet 3     No current facility-administered medications for this visit.       No Known Allergies    History reviewed. No pertinent past medical history.     Review of Systems  ROS:  Specifically negative for bowel/bladder dysfunction, balance changes, headache, dizziness, foot drop, fevers, chills, appetite changes, nausea/vomiting, unexplained weight loss. Otherwise 13 systems reviewed are negative. Please see the patient's intake questionnaire from today for details.    Reviewed Social, Family, Past Medical and Past Surgical history with patient, no significant changes noted since prior visit.     Objective:     VIRTUAL PHYSICAL EXAM:   --CONSTITUTIONAL: Well developed, well nourished, healthy appearing individual.  --PSYCHIATRIC: Appropriate mood and affect. No difficulty interacting due to temper, social withdrawal, or memory issues.      RESULTS:   Imaging: Lumbar spine imaging was reviewed today. The images were shown to the patient and the findings were explained using a spine model.      Lumbar spine x-ray reviewed

## 2025-05-12 NOTE — PATIENT INSTRUCTIONS
"Recommendations from today's MTM visit:                                                         No medication changes today - continue to monitor for improvement in swelling with continuation of furosemide      Continue to monitor blood sugars at home.    Follow-up: Return in 4 weeks (on 6/5/2025) for Follow up, with me, using a phone visit.    It was great speaking with you today.  I value your experience and would be very thankful for your time in providing feedback in our clinic survey. In the next few days, you may receive an email or text message from [employer] with a link to a survey related to your  clinical pharmacist.\"     To schedule another MTM appointment, please call the clinic directly or you may call the MTM scheduling line at [phone].    My Clinical Pharmacist's contact information:                                                      Please feel free to contact me with any questions or concerns you have.      Carine Man, Joey  Medication Therapy Management (MTM) Pharmacist   [facility]     "

## 2025-05-13 ENCOUNTER — VIRTUAL VISIT (OUTPATIENT)
Dept: PHYSICAL MEDICINE AND REHAB | Facility: CLINIC | Age: 70
End: 2025-05-13
Payer: COMMERCIAL

## 2025-05-13 DIAGNOSIS — S32.030S CLOSED COMPRESSION FRACTURE OF L3 LUMBAR VERTEBRA, SEQUELA: ICD-10-CM
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
## 2025-05-13 DIAGNOSIS — M54.50 ACUTE LEFT-SIDED LOW BACK PAIN WITHOUT SCIATICA: Primary | ICD-10-CM

## 2025-05-13 ASSESSMENT — PAIN SCALES - GENERAL: PAINLEVEL_OUTOF10: MILD PAIN (1)

## 2025-05-13 NOTE — LETTER
May 13, 2025      Korin Sharma  [address]  [place] [address]        To Whom It May Concern:    Korin Sharma was seen in our clinic. She may return to work with the following: 15 pound lifting limit, limiting bending and twisting as tolerated.   Restrictions for 4 weeks. Will revisit further workability needs at that time.       Sincerely,       Rosita Waldrop CNP     Electronically signed

## 2025-05-13 NOTE — PATIENT INSTRUCTIONS
~Spine Center Scheduling #[phone].  ~Please call our [facility] Spine Nurse Navigation #[phone] with any questions or concerns about your treatment plan, if symptoms worsen and you would like to be seen urgently, or if you have problems controlling bladder and bowel function.  ~For any future flareups or new symptoms, recommend follow-up in clinic or contact the nurse navigator line.  ~Please note that any My Chart messages may take multiple days for a response due to the high volume of patients seen in clinic.  Anything sent Thursday night or after will be answered the following week when able, as Rosita Waldrop CNP does not work in clinic on Fridays.   ~Rosita Waldrop CNP is at the [facility] on Tuesdays, otherwise primarily at the [facility].       Importance of specialized Physical Therapy: Discussed the importance of core strengthening, ROM, stretching exercises with the patient and how each of these entities is important in decreasing pain.  Explained to the patient that the purpose of physical therapy is to teach the patient a home exercise program individualized to them and their specific health concerns.  These exercises need to be performed every day in order to decrease pain and prevent future occurrences of pain.

## 2025-05-13 NOTE — Clinical Note
5/13/2025      Korin Sharma  [address]  [place] [address]      Dear Colleague,    Thank you for referring your patient, Korin Sharma, to the [facility]. Please see a copy of my visit note below.    Korin Sharma is a 70 year old female who is being evaluated via a billable video visit.      How would you like to obtain your AVS? MyChart    {If patient encounters technical issues they should call [phone]}      Video-Visit Details    Type of service:  Video Visit    Originating Location (pt. Location): Home    Distant Location (provider location):  [facility]     Platform used for Video Visit: [employer]    Assessment:     Diagnoses and all orders for this visit:  Acute left-sided low back pain without sciatica  Closed compression fracture of L3 lumbar vertebra, sequela  Injury caused by lifting, subsequent encounter     Korin Sharma is a 70 year old y.o. female with past medical history significant for vitamin D deficiency, cholelithiasis, pancreatitis, hypothyroidism, diabetes mellitus, hypertension who presents today for follow-up regarding:    -***     Plan:     A shared decision making plan was used. The patient's values and choices were respected. Prior medical records were reviewed today. The following represents what was discussed and decided upon by the provider and the patient.        -DIAGNOSTIC TESTS: Images were personally reviewed and interpreted.   --***  --Right lower extremity ultrasound 3/20/2025 with no DVT noted.  --Lumbar spine x-ray 3/17/2025 with lumbosacral anatomy with partial sacralization of L5.  Mild compression deformity of L3, stable since prior x-ray.  --Lumbar spine x-ray 2/17/2025 with mild vertebral height loss of L3, age indeterminant.  --Bone density scan 12/31/2024 with normal bone density     -INTERVENTIONS: ***    -MEDICATIONS: ***   Discussed side effects of medications and proper  use. Patient verbalized understanding.    -PHYSICAL THERAPY: ***  Discussed the importance of core strengthening, ROM, stretching exercises with the patient and how each of these entities is important in decreasing pain.  Explained to the patient that the purpose of physical therapy is to teach the patient a home exercise program.  These exercises need to be performed every day in order to decrease pain and prevent future occurrences of pain.        -PATIENT EDUCATION:  Total time of *** minutes, on the day of service, spent with the patient, reviewing the chart, placing orders, and documenting.   -Today we also discussed the issues related to the current COVID-19 pandemic, the pros and cons of the current treatment plan, the CDC guidelines such as social distancing, washing the hands, and covering the cough.    -FOLLOW UP: ***   Advised to contact clinic if symptoms worsen or change.    Subjective:     Korin Sharma is a 70 year old female who presents today for follow-up regarding ***    *Work injury 1/16/2025.  Patient is a  was putting on a trailer and lifting the hitch to go over the ball, immediately noted severe pain.     -Treatment to Date: No prior spinal surgery or spinal injection  Physical therapy eval 2/6/2025 [facility]     -Medications:  Tizanidine    Patient Active Problem List   Diagnosis    Class 2 severe obesity with body mass index (BMI) of 35 to 39.9 with serious comorbidity (H)    Hypothyroidism    Cholelithiasis    Acute Pancreatitis    Menopause Has Occurred    Vitamin D Deficiency    Diabetes mellitus (H)    Benign essential hypertension    Hypertriglyceridemia    Prediabetes    Chronic bilateral low back pain without sciatica    Cellulitis       Current Outpatient Medications   Medication Sig Dispense Refill    aspirin 81 MG EC tablet [ASPIRIN 81 MG EC TABLET] Take 81 mg by mouth daily.      blood glucose (ACCU-CHEK RENATE PLUS) test strip Use to test blood sugar once daily or as  directed. 100 strip 3    blood-glucose meter Misc [BLOOD-GLUCOSE METER MISC] Use 1 Device As Directed daily. 1 each 0    calcium carbonate (TUMS) 500 MG chewable tablet Take 500 mg by mouth daily as needed for heartburn.      cholecalciferol, vitamin D3, 50 mcg (2,000 unit) Tab [CHOLECALCIFEROL, VITAMIN D3, 50 MCG (2,000 UNIT) TAB] Take 2,000 Units by mouth daily.      Ferrous Sulfate (IRON PO) Take 1 tablet by mouth daily.      furosemide (LASIX) 20 MG tablet Take 1 tablet (20 mg) by mouth daily. 30 tablet 2    generic lancets (FINGERSTIX LANCETS) [GENERIC LANCETS (FINGERSTIX LANCETS)] Dispense brand per patient's insurance at pharmacy discretion. 100 each 3    glipiZIDE (GLUCOTROL XL) 10 MG 24 hr tablet Take 1 tablet (10 mg) by mouth daily. 30 tablet 1    ibuprofen (ADVIL/MOTRIN) 200 MG tablet Take 200-400 mg by mouth every 6 hours as needed for inflammatory pain.      levothyroxine (SYNTHROID/LEVOTHROID) 175 MCG tablet Take 1 tablet (175 mcg) by mouth daily. 90 tablet 3    lisinopril-hydrochlorothiazide (ZESTORETIC) 10-12.5 MG tablet Take 1 tablet by mouth daily. 90 tablet 3    magnesium 250 MG tablet Take 1 tablet by mouth at bedtime      metFORMIN (GLUCOPHAGE) 500 MG tablet TAKE 2 TABLETS BY MOUTH TWICE  DAILY WITH MEALS 400 tablet 2    multivitamin, therapeutic (THERA-VIT) TABS tablet Take 1 tablet by mouth daily      pioglitazone (ACTOS) 15 MG tablet Take 1 tablet (15 mg) by mouth daily. 30 tablet 1    rosuvastatin (CRESTOR) 10 MG tablet Take 1 tablet (10 mg) by mouth daily. 90 tablet 3     No current facility-administered medications for this visit.       No Known Allergies    No past medical history on file.     Review of Systems  ROS: ***  Specifically negative for bowel/bladder dysfunction, balance changes, headache, dizziness, foot drop, fevers, chills, appetite changes, nausea/vomiting, unexplained weight loss. Otherwise 13 systems reviewed are negative. Please see the patient's intake questionnaire  from today for details.    Reviewed Social, Family, Past Medical and Past Surgical history with patient, no significant changes noted since prior visit.     Objective:     VIRTUAL PHYSICAL EXAM:   --CONSTITUTIONAL: Well developed, well nourished, healthy appearing individual.  --PSYCHIATRIC: Appropriate mood and affect. No difficulty interacting due to temper, social withdrawal, or memory issues.  --SKIN: Lumbar region is visually dry and intact.   --RESPIRATORY: Normal rhythm and effort. No abnormal accessory muscle breathing patterns noted.   --STANDING EXAMINATION:  Normal lumbar lordosis noted, no lateral shift.  --MUSCULOSKELETAL: Lumbar spine inspection reveals no evidence of deformity. Range of motion is not limited in lumbar flexion, extension, lateral rotation.   --GROSS MOTOR: Easily arises from a seated position.   --LOWER EXTREMITY MOTOR TESTING:   Full and equal bilateral active range of motion with knee extension/flexion and ankle dorsiflexion/extension to anti-gravity.     RESULTS:   Imaging: Lumbar spine imaging was reviewed today. The images were shown to the patient and the findings were explained using a spine model.      Lumbar spine x-ray reviewed                          Again, thank you for allowing me to participate in the care of your patient.        Sincerely,        Rosita Waldrop CNP    Electronically signed

## 2025-05-15 ENCOUNTER — MYC MEDICAL ADVICE (OUTPATIENT)
Dept: PHYSICAL MEDICINE AND REHAB | Facility: CLINIC | Age: 70
End: 2025-05-15
Payer: COMMERCIAL

## 2025-05-15 ENCOUNTER — ANCILLARY PROCEDURE (OUTPATIENT)
Dept: GENERAL RADIOLOGY | Facility: CLINIC | Age: 70
End: 2025-05-15
Attending: NURSE PRACTITIONER
Payer: OTHER MISCELLANEOUS

## 2025-05-15 DIAGNOSIS — S32.030S CLOSED COMPRESSION FRACTURE OF L3 LUMBAR VERTEBRA, SEQUELA: ICD-10-CM

## 2025-05-18 ENCOUNTER — HEALTH MAINTENANCE LETTER (OUTPATIENT)
Age: 70
End: 2025-05-18

## 2025-05-19 ENCOUNTER — RESULTS FOLLOW-UP (OUTPATIENT)
Dept: PHYSICAL MEDICINE AND REHAB | Facility: CLINIC | Age: 70
End: 2025-05-19

## 2025-05-20 ENCOUNTER — MYC MEDICAL ADVICE (OUTPATIENT)
Dept: FAMILY MEDICINE | Facility: CLINIC | Age: 70
End: 2025-05-20
Payer: COMMERCIAL

## 2025-05-20 DIAGNOSIS — I10 BENIGN ESSENTIAL HYPERTENSION: ICD-10-CM

## 2025-05-20 DIAGNOSIS — E78.2 MIXED HYPERLIPIDEMIA: ICD-10-CM

## 2025-05-20 NOTE — TELEPHONE ENCOUNTER
Sent [employer] message to Korin Sharma in regards to their lisinopril/hydrochlorothiazide and rosuvastatin being overdue for refill. Per our records last filled 1/31/25 for 100 day supply and is 9 days late to refill.      Dianna Beaver, PharmD, [facility]  Population Health Pharmacist  [phone]

## 2025-06-05 ENCOUNTER — VIRTUAL VISIT (OUTPATIENT)
Dept: PHARMACY | Facility: CLINIC | Age: 70
End: 2025-06-05
Payer: COMMERCIAL

## 2025-06-05 DIAGNOSIS — I10 BENIGN ESSENTIAL HYPERTENSION: Primary | ICD-10-CM

## 2025-06-05 DIAGNOSIS — R60.0 PERIPHERAL EDEMA: ICD-10-CM

## 2025-06-05 DIAGNOSIS — E11.9 TYPE 2 DIABETES MELLITUS WITHOUT COMPLICATION, WITHOUT LONG-TERM CURRENT USE OF INSULIN (H): ICD-10-CM

## 2025-06-05 RX ORDER — ROSUVASTATIN CALCIUM 10 MG/1
10 TABLET, COATED ORAL DAILY
Qty: 100 TABLET | Refills: 1 | Status: SHIPPED | OUTPATIENT
Start: 2025-06-05

## 2025-06-05 RX ORDER — GLIPIZIDE 10 MG/1
20 TABLET, FILM COATED, EXTENDED RELEASE ORAL DAILY
Qty: 180 TABLET | Refills: 1 | Status: SHIPPED | OUTPATIENT
Start: 2025-06-05

## 2025-06-05 RX ORDER — LISINOPRIL AND HYDROCHLOROTHIAZIDE 10; 12.5 MG/1; MG/1
1 TABLET ORAL DAILY
Qty: 100 TABLET | Refills: 1 | Status: SHIPPED | OUTPATIENT
Start: 2025-06-05

## 2025-06-05 RX ORDER — FUROSEMIDE 20 MG/1
20 TABLET ORAL DAILY
Qty: 30 TABLET | Refills: 2 | Status: SHIPPED | OUTPATIENT
Start: 2025-06-05

## 2025-06-05 NOTE — TELEPHONE ENCOUNTER
Patient has [facility] coverage and with this insurance plan, the patient is eligible to receive certain prescriptions as a 100-day supply at the 90-day supply cost.      Prescriptions updated to 100-day supply per protocol: rosuvastatin and lisinopril/hydrochlorothiazide       Dianna Beaver, PharmD, [facility]  Population Health Pharmacist  [phone]

## 2025-06-05 NOTE — Clinical Note
Maddison-I have met with Yady today for a follow-up visit.  Discontinuing pioglitazone due to concern it may have been worsening her swelling, furosemide has been helpful so I having her get an updated BMP to monitor.  Increasing glipizide dose to help with blood sugars.

## 2025-06-05 NOTE — PROGRESS NOTES
Medication Therapy Management (MTM) Encounter    ASSESSMENT:                            Medication Adherence/Access: See below for considerations    Diabetes   Patient is not meeting A1c goal of < 7% -less than 8% for CDL requirement. Self monitoring of blood glucose is not at goal of fasting  mg/dL.  Will discontinue pioglitazone since patient declines continuing with concern for contribution to lower extremity edema.  Reviewed options for med changes including Brenzavvy through [facility] or increasing glipizide dose.  Higher doses of glipizide increased risk for side effects though options for additional medications limited by cost therefore the benefit outweighs the risk at this time.  Will increase glipizide XL to 20 mg daily.  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  Recommend patient discontinue aspirin for primary prevention considering age.     Hypertension/edema  Patient is meeting blood pressure goal of < 130/80mmHg per last clinic blood pressure.  Has continued to have benefit with addition of furosemide for management of edema.  Will get updated basic metabolic panel to monitor electrolytes and kidneys.  Will send new prescription to [facility] as this is patient's preferred pharmacy.    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    SUBJECTIVE/OBJECTIVE:                          Yady Sharma is a 70 year old female seen for a follow-up visit.       Reason for  visit: diabetes, edema.    Allergies/ADRs: Reviewed in chart  Past Medical History: Reviewed in chart  Tobacco: She reports that she has quit smoking. Her smoking use included cigarettes. She has a 13 pack-year smoking history. She has been exposed to tobacco smoke. She has never used smokeless tobacco.  Alcohol:  not currently using     Medication Adherence/Access: Lapses in refill history noted, patient says she has been more adherent to medications lately though has been having issues getting medications consistently from [facility].    Diabetes   pioglitazone 15mg daily - patient reports she stopped taking this after our last visit since she was concerned it could be worsening her leg swelling   Metformin 1000mg twice daily   Glipizide XL 10mg daily   Aspirin 81mg daily -patient reports for primary prevention   Side effects?  Lower extremity edema-has improved though has continued on furosemide  Working on diet - more veggies, rice and potatoes limited to several times per week, not using real sugar  Physical activity is limited by back pain due to fracture, pain in her leg following resolution in cellulitis  Patient not interested in GLP-1, SGLT2 inhibitor due to cost-discussed option of [place] through [facility] though patient declined as she does not want to spend any additional money on her medications.    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  Medication History:  Ozempic - limited by cost so was working with patient assistance program, noted some side effects, patient not interested in restarting since she does not like what she has been hearing about it, also issues with getting consistent supply through patient assistance program   Jardiance - limited by cost, copay of $400 at the end of      Blood sugar monitorin time(s) daily; Ranges: (patient reported) Fasting- 150s-180s  Eye exam in the last 12 months?  No  Foot exam is up to date    Hypertension /edema  Lisinopril-hydrochlorothiazide 10-12.5mg daily   Furosemide 20mg daily - started by primary care provider for edema management, going fine, swelling in the leg has not gotten worse but stable, does not swell up as much during the end of the day    Patient reports no current medication side effects  Patient does not self-monitor blood pressure.      BP Readings from Last 3 Encounters:   04/15/25 127/75   03/24/25 122/64   03/20/25 138/72          Today's Vitals: LMP  (LMP Unknown)   ----------------      I spent 20 minutes with this patient today. All changes were made via collaborative practice agreement with ASHLEY Chavez CNP.     A summary of these recommendations was sent via [employer].    Robert AlcantarD  Medication Therapy Management (MTM) Pharmacist   [facility]    Telemedicine Visit Details  The patient's medications can be safely assessed via a telemedicine encounter.  Type of service:  Telephone visit  Originating Location (pt. Location): Home    Distant Location (provider location):  On-site  Start Time: 10am  End Time: 10:20am     Medication Therapy Recommendations  Diabetes mellitus (H)   1 Current Medication: aspirin 81 MG EC tablet (Discontinued)   Current Medication Sig: [ASPIRIN 81 MG EC TABLET] Take 81 mg by mouth daily.   Rationale: No medical indication at this time - Unnecessary medication therapy - Indication   Recommendation: Discontinue Medication   Status: Patient Agreed - Adherence/Education   Identified Date: 6/5/2025 Completed Date: 6/5/2025         2 Current Medication: glipiZIDE (GLUCOTROL XL) 10 MG 24 hr tablet (Discontinued)   Current Medication Sig: Take 1 tablet (10 mg) by mouth daily.   Rationale: Dose too low - Dosage too low - Effectiveness   Recommendation: Increase Dose   Status: Accepted per CPA   Identified Date: 6/5/2025 Completed Date: 6/5/2025         3 Current Medication: pioglitazone  (ACTOS) 15 MG tablet (Discontinued)   Current Medication Sig: Take 1 tablet (15 mg) by mouth daily.   Rationale: Undesirable effect - Adverse medication event - Safety   Recommendation: Discontinue Medication   Status: Accepted per CPA   Identified Date: 6/5/2025 Completed Date: 6/5/2025

## 2025-06-06 NOTE — PATIENT INSTRUCTIONS
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    It was great speaking with you today.  I value your experience and would be very thankful for your time in providing feedback in our clinic survey. In the next few days, you may receive an email or text message from [employer] with a link to a survey related to your  clinical pharmacist.\"     To schedule another MTM appointment, please call the clinic directly or you may call the MTM scheduling line at [phone].    My Clinical Pharmacist's contact information:                                                      Please feel free to contact me with any questions or concerns you have.      Carine Man PharmD  Medication Therapy Management (MTM) Pharmacist   [facility]     "

## 2025-06-10 ENCOUNTER — VIRTUAL VISIT (OUTPATIENT)
Dept: PHYSICAL MEDICINE AND REHAB | Facility: CLINIC | Age: 70
End: 2025-06-10
Payer: OTHER MISCELLANEOUS

## 2025-06-10 DIAGNOSIS — S32.030S CLOSED COMPRESSION FRACTURE OF L3 LUMBAR VERTEBRA, SEQUELA: ICD-10-CM

## 2025-06-10 DIAGNOSIS — M54.50 ACUTE LEFT-SIDED LOW BACK PAIN WITHOUT SCIATICA: Primary | ICD-10-CM
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
## 2025-06-10 PROCEDURE — 98006 SYNCH AUDIO-VIDEO EST MOD 30: CPT | Performed by: NURSE PRACTITIONER

## 2025-06-10 ASSESSMENT — PAIN SCALES - GENERAL: PAINLEVEL_OUTOF10: NO PAIN (0)

## 2025-06-10 NOTE — LETTER
Tianna 10, 2025      Korin Sharma  [address]  [place] [address]        To Whom It May Concern:    Korin Sharma was seen in our clinic. 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     On 25 return to full duty without restriction.     Please fax to: [phone]    Sincerely,    Rosita Waldrop CNP       Electronically signed

## 2025-06-10 NOTE — PATIENT INSTRUCTIONS
~Spine Center Scheduling #[phone].  ~Please call our [facility] Spine Nurse Navigation #[phone] with any questions or concerns about your treatment plan, if symptoms worsen and you would like to be seen urgently, or if you have problems controlling bladder and bowel function.  ~For any future flareups or new symptoms, recommend follow-up in clinic or contact the nurse navigator line.  ~Please note that any My Chart messages may take multiple days for a response due to the high volume of patients seen in clinic.  Anything sent Thursday night or after will be answered the following week when able, as Rosita Waldrop CNP does not work in clinic on Fridays.   ~Rosita Waldrop CNP is at the [facility] on Tuesdays, otherwise primarily at the [facility].       Importance of specialized Physical Therapy: Discussed the importance of core strengthening, ROM, stretching exercises with the patient and how each of these entities is important in decreasing pain.  Explained to the patient that the purpose of physical therapy is to teach the patient a home exercise program individualized to them and their specific health concerns.  These exercises need to be performed every day in order to decrease pain and prevent future occurrences of pain.

## 2025-06-10 NOTE — PROGRESS NOTES
Korin Sharma is a 70 year old female who is being evaluated via a billable video visit.      How would you like to obtain your AVS? "[employer]hart          Video-Visit Details    Type of service:  Video Visit    Originating Location (pt. Location): Home    Distant Location (provider location):  [facility] [employer]     Platform used for Video Visit: [employer]ong[employer]ou    Assessment:     Diagnoses and all orders for this visit:  Acute left-sided low back pain without sciatica  Closed compression fracture of L3 lumbar vertebra, sequela  Injury caused by lifting, subsequent encounter     Korin Sharma is a 70 year old y.o. female with past medical history significant for vitamin D deficiency, cholelithiasis, pancreatitis, hypothyroidism, diabetes mellitus, hypertension who presents today for follow-up regarding:    - Bilateral low back pain dramatically improving.     Plan:     A shared decision making plan was used. The patient's values and choices were respected. Prior medical records were reviewed today. The following represents what was discussed and decided upon by the provider and the patient.        -DIAGNOSTIC TESTS: Images were personally reviewed and interpreted.   -- No further imaging needed at this time.  --Right lower extremity ultrasound 3/20/2025 with no DVT noted.  --Lumbar spine x-ray 3/17/2025 with lumbosacral anatomy with partial sacralization of L5.  Mild compression deformity of L3, stable since prior x-ray.  --Lumbar spine x-ray 2/17/2025 with mild vertebral height loss of L3, age indeterminant.  --Bone density scan 12/31/2024 with normal bone density     -INTERVENTIONS: No recommendations for injections at this time.    -MEDICATIONS: No changes in medications.  Discussed side effects of medications and proper use. Patient verbalized understanding.    -PHYSICAL THERAPY: Advised patient to continue with physical therapy at least 4 more sessions to work on safe lifting practices.  Discussed  the importance of core strengthening, ROM, stretching exercises with the patient and how each of these entities is important in decreasing pain.  Explained to the patient that the purpose of physical therapy is to teach the patient a home exercise program.  These exercises need to be performed every day in order to decrease pain and prevent future occurrences of pain.        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    -PATIENT EDUCATION:  Total time of 32 minutes, on the day of service, spent with the patient, reviewing the chart, placing orders, and documenting.   -Today we also discussed the issues related to the current COVID-19 pandemic, the pros and cons of the current treatment plan, the CDC guidelines such as social distancing, washing the hands, and covering the cough.    -FOLLOW UP: Follow-up 4 weeks, sooner if needed  Advised to contact clinic if symptoms worsen or change.    Subjective:     Korin Sharma is a 70 year old female who presents today for follow-up regarding bilateral low back pain which is dramatically improved with physical therapy and time at this time.  She is no longer using the back brace and her current pain today is a 0/10 does get up to a 1/10 with excessive activity but she is doing well.  She is working on increasing her lifting restrictions with physical therapy and doing well with 15 pounds.    *Work injury 1/16/2025.  Patient is a  was putting on a trailer and lifting the hitch to go over the ball, immediately noted severe pain.     Patient's STEVEN Eric was present on the phone during the visit today.  Work note faxed to [phone].     -Treatment to Date: No prior spinal surgery or spinal injection  Physical therapy February through May 2025 LBP     -Medications:  Tizanidine    Patient Active Problem List   Diagnosis     Class 2 severe obesity with body mass index (BMI) of 35 to 39.9 with serious comorbidity (H)    Hypothyroidism    Cholelithiasis    Acute Pancreatitis    Menopause Has Occurred    Vitamin D Deficiency    Diabetes mellitus (H)    Benign essential hypertension    Hypertriglyceridemia    Prediabetes    Chronic bilateral low back pain without sciatica    Cellulitis       Current Outpatient Medications   Medication Sig Dispense Refill    ibuprofen (ADVIL/MOTRIN) 200 MG tablet Take 200-400 mg by mouth every 6 hours as needed for inflammatory pain.      blood glucose (ACCU-CHEK RENATE PLUS) test strip Use to test blood sugar once daily or as directed. 100 strip 3    blood-glucose meter Misc [BLOOD-GLUCOSE METER MISC] Use 1 Device As Directed daily. 1 each 0    calcium carbonate (TUMS) 500 MG chewable tablet Take 500 mg by mouth daily as needed for heartburn.      cholecalciferol, vitamin D3, 50 mcg (2,000 unit) Tab [CHOLECALCIFEROL, VITAMIN D3, 50 MCG (2,000 UNIT) TAB] Take 2,000 Units by mouth daily.      Ferrous Sulfate (IRON PO) Take 1 tablet by mouth daily.      furosemide (LASIX) 20 MG tablet Take 1 tablet (20 mg) by mouth daily. 30 tablet 2    generic lancets (FINGERSTIX LANCETS) [GENERIC LANCETS (FINGERSTIX LANCETS)] Dispense brand per patient's insurance at pharmacy discretion. 100 each 3    glipiZIDE (GLUCOTROL XL) 10 MG 24 hr tablet Take 2 tablets (20 mg) by mouth daily. 180 tablet 1    levothyroxine (SYNTHROID/LEVOTHROID) 175 MCG tablet Take 1 tablet (175 mcg) by mouth daily. 90 tablet 3    lisinopril-hydrochlorothiazide (ZESTORETIC) 10-12.5 MG tablet Take 1 tablet by mouth daily. 100 tablet 1    magnesium 250 MG tablet Take 1 tablet by mouth at bedtime      metFORMIN (GLUCOPHAGE) 500 MG tablet TAKE 2 TABLETS BY MOUTH TWICE  DAILY WITH MEALS 400 tablet 2    multivitamin, therapeutic (THERA-VIT) TABS tablet Take 1 tablet by mouth daily      rosuvastatin (CRESTOR) 10 MG tablet Take 1 tablet (10 mg) by mouth daily.  100 tablet 1     No current facility-administered medications for this visit.       No Known Allergies    History reviewed. No pertinent past medical history.     Review of Systems  ROS:  Specifically negative for bowel/bladder dysfunction, balance changes, headache, dizziness, foot drop, fevers, chills, appetite changes, nausea/vomiting, unexplained weight loss. Otherwise 13 systems reviewed are negative. Please see the patient's intake questionnaire from today for details.    Reviewed Social, Family, Past Medical and Past Surgical history with patient, no significant changes noted since prior visit.     Objective:     VIRTUAL PHYSICAL EXAM:   --CONSTITUTIONAL: Well developed, well nourished, healthy appearing individual.  --PSYCHIATRIC: Appropriate mood and affect. No difficulty interacting due to temper, social withdrawal, or memory issues.  --SKIN: Lumbar region is visually dry and intact.   --RESPIRATORY: Normal rhythm and effort. No abnormal accessory muscle breathing patterns noted.   --STANDING EXAMINATION:  Normal lumbar lordosis noted, no lateral shift.  --MUSCULOSKELETAL: Lumbar spine inspection reveals no evidence of deformity. Range of motion is not limited in lumbar flexion, extension, lateral rotation.   --GROSS MOTOR: Easily arises from a seated position.   --LOWER EXTREMITY MOTOR TESTING:   Full and equal bilateral active range of motion with knee extension/flexion and ankle dorsiflexion/extension to anti-gravity.     RESULTS:   Imaging: Lumbar spine imaging was reviewed today. The images were shown to the patient and the findings were explained using a spine model.      XR Lumbar Spine 2/3 Views  Result Date: 5/15/2025  EXAM: XR LUMBAR SPINE 2/3 VIEWS LOCATION: [facility] DATE: 5/15/2025 INDICATION: Evaluate L3 fracture COMPARISON: X-ray 3/17/2025     IMPRESSION: Five lumbar vertebral bodies. 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 Mild chronic loss of height superior endplate  L3 similar to prior. Normal other vertebral heights and alignment. Similar moderate to marked loss of disc space height and sclerosis at L4-5. Normal extraspinal structures.

## 2025-06-11 ENCOUNTER — THERAPY VISIT (OUTPATIENT)
Age: 70
End: 2025-06-11
Payer: OTHER MISCELLANEOUS

## 2025-06-11 DIAGNOSIS — G89.29 CHRONIC BILATERAL LOW BACK PAIN WITHOUT SCIATICA: Primary | ICD-10-CM

## 2025-06-11 DIAGNOSIS — M54.50 CHRONIC BILATERAL LOW BACK PAIN WITHOUT SCIATICA: Primary | ICD-10-CM

## 2025-06-11 PROCEDURE — 97110 THERAPEUTIC EXERCISES: CPT | Mod: GP | Performed by: PHYSICAL THERAPIST

## 2025-06-11 PROCEDURE — 97530 THERAPEUTIC ACTIVITIES: CPT | Mod: GP | Performed by: PHYSICAL THERAPIST

## 2025-06-18 ENCOUNTER — THERAPY VISIT (OUTPATIENT)
Age: 70
End: 2025-06-18
Payer: OTHER MISCELLANEOUS

## 2025-06-18 DIAGNOSIS — G89.29 CHRONIC BILATERAL LOW BACK PAIN WITHOUT SCIATICA: Primary | ICD-10-CM

## 2025-06-18 DIAGNOSIS — M54.50 CHRONIC BILATERAL LOW BACK PAIN WITHOUT SCIATICA: Primary | ICD-10-CM

## 2025-06-18 PROCEDURE — 97530 THERAPEUTIC ACTIVITIES: CPT | Mod: GP | Performed by: PHYSICAL THERAPIST

## 2025-06-18 PROCEDURE — 97110 THERAPEUTIC EXERCISES: CPT | Mod: GP | Performed by: PHYSICAL THERAPIST

## 2025-06-26 ENCOUNTER — THERAPY VISIT (OUTPATIENT)
Age: 70
End: 2025-06-26
Payer: OTHER MISCELLANEOUS

## 2025-06-26 DIAGNOSIS — M54.50 CHRONIC BILATERAL LOW BACK PAIN WITHOUT SCIATICA: Primary | ICD-10-CM

## 2025-06-26 DIAGNOSIS — G89.29 CHRONIC BILATERAL LOW BACK PAIN WITHOUT SCIATICA: Primary | ICD-10-CM

## 2025-07-01 ENCOUNTER — THERAPY VISIT (OUTPATIENT)
Age: 70
End: 2025-07-01
Payer: OTHER MISCELLANEOUS

## 2025-07-01 DIAGNOSIS — G89.29 CHRONIC BILATERAL LOW BACK PAIN WITHOUT SCIATICA: Primary | ICD-10-CM

## 2025-07-01 DIAGNOSIS — M54.50 CHRONIC BILATERAL LOW BACK PAIN WITHOUT SCIATICA: Primary | ICD-10-CM

## 2025-07-01 PROCEDURE — 97110 THERAPEUTIC EXERCISES: CPT | Mod: GP

## 2025-07-03 ENCOUNTER — VIRTUAL VISIT (OUTPATIENT)
Dept: PHARMACY | Facility: CLINIC | Age: 70
End: 2025-07-03
Payer: COMMERCIAL

## 2025-07-03 DIAGNOSIS — E11.9 TYPE 2 DIABETES MELLITUS WITHOUT COMPLICATION, WITHOUT LONG-TERM CURRENT USE OF INSULIN (H): Primary | ICD-10-CM

## 2025-07-03 DIAGNOSIS — R60.0 PERIPHERAL EDEMA: ICD-10-CM

## 2025-07-03 DIAGNOSIS — I10 BENIGN ESSENTIAL HYPERTENSION: ICD-10-CM

## 2025-07-03 RX ORDER — BLOOD SUGAR DIAGNOSTIC
STRIP MISCELLANEOUS
Qty: 100 STRIP | Refills: 3 | Status: SHIPPED | OUTPATIENT
Start: 2025-07-03

## 2025-07-03 NOTE — PATIENT INSTRUCTIONS
"Recommendations from today's MTM visit:                                                       New prescription for test strips sent to [facility]     Make lab only appointment to get updated basic metabolic panel to monitor electrolytes and kidneys since continuing on furosemide, updated A1c for CDL     Follow-up: MTM pharmacist will reach out to patient to decided on follow up date/time after labs result     It was great speaking with you today.  I value your experience and would be very thankful for your time in providing feedback in our clinic survey. In the next few days, you may receive an email or text message from [employer] with a link to a survey related to your  clinical pharmacist.\"     To schedule another MTM appointment, please call the clinic directly or you may call the MTM scheduling line at [phone].    My Clinical Pharmacist's contact information:                                                      Please feel free to contact me with any questions or concerns you have.      Carine Man, PharmD  Medication Therapy Management (MTM) Pharmacist   [facility]     "

## 2025-07-03 NOTE — PROGRESS NOTES
Medication Therapy Management (MTM) Encounter    ASSESSMENT:                            Medication Adherence/Access: No issues identified.    Diabetes   Patient is not meeting A1c goal of < 7% -less than 8% for CDL requirement. Self monitoring of blood glucose has improved, at goal of fasting  mg/dL. Recommend patient obtain updated A1c as planned at last visit. Will send new prescription to pharmacy for test strips.      Hypertension/edema  Patient is meeting blood pressure goal of < 130/80mmHg per last clinic blood pressure.  Has continued to have benefit with addition of furosemide for management of edema.  Recommend getting updated basic metabolic panel to monitor electrolytes and kidneys, as planned at last visit.      PLAN:                            New prescription for test strips sent to [facility]     Make lab only appointment to get updated basic metabolic panel to monitor electrolytes and kidneys since continuing on furosemide, updated A1c for CDL     Follow-up: MTM pharmacist will reach out to patient to decided on follow up date/time after labs result     SUBJECTIVE/OBJECTIVE:                          Yady Sharma is a 70 year old female seen for a follow-up visit.       Reason for visit: diabetes, edema.    Allergies/ADRs: Reviewed in chart  Past Medical History: Reviewed in chart  Tobacco: She reports that she has quit smoking. Her smoking use included cigarettes. She has a 13 pack-year smoking history. She has been exposed to tobacco smoke. She has never used smokeless tobacco.  Alcohol: not currently using     Medication Adherence/Access: no issues reported.    Diabetes   Metformin 1000mg twice daily   Glipizide XL 20mg daily - dose increase at last visit   Side effects?  Denies  Working on diet - more veggies, rice and potatoes limited to several times per week, not using real sugar  Physical activity as able, limited by back pain due to fracture, pain in her leg following resolution in  cellulitis  Patient not interested in GLP-1, SGLT2 inhibitor due to cost-discussed option of Brenzavvy through [facility]'s cost plus drugs though patient declined as she does not want to spend any additional money on her medications.    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% - did not get this drawn after last MTM visit  Medication History:  Ozempic - limited by cost so was working with patient assistance program, noted some side effects, patient not interested in restarting since she does not like what she has been hearing about it, also issues with getting consistent supply through patient assistance program   Jardiance - limited by cost, copay of $400 at the end of 2024     Blood sugar monitoring: Ranges: (patient reported) Fasting- lowest of 114 and generally around 120, 150-160s during the day    Patient reports she needs new prescription for test strips   Eye exam in the last 12 months? No  Foot exam is up to date    Hypertension   /edema  Lisinopril-hydrochlorothiazide 10-12.5mg daily   Furosemide 20mg daily - patient reports swelling continues to improve, thinks this med is working, did not get updated BMP drawn after last MTM visit  Patient reports no current medication side effects  Patient does not self-monitor blood pressure.      BP Readings from Last 3 Encounters:   04/15/25 127/75   03/24/25 122/64   03/20/25 138/72            Today's Vitals: LMP  (LMP Unknown)   ----------------      I spent 10 minutes with this patient today. All changes were made via collaborative practice agreement with ASHLEY Chavez CNP.     A summary of these recommendations was sent via [employer].    Carine Mna PharmD  Medication Therapy Management (MTM) Pharmacist   [facility]    Telemedicine Visit Details  The patient's medications can be safely assessed via a telemedicine encounter.  Type of service:  Telephone visit  Originating  Location (pt. Location): Home    Distant Location (provider location):  On-site  Start Time: 9:30am  End Time: 9:40am     Medication Therapy Recommendations  No medication therapy recommendations to display

## 2025-07-07 NOTE — PROGRESS NOTES
Korin Sharma is a 70 year old female who is being evaluated via a billable video visit.      How would you like to obtain your AVS? Valchemyhart          Video-Visit Details    Type of service:  Video Visit    Originating Location (pt. Location): Home    Distant Location (provider location):  [facility]     Platform used for Video Visit: Sokolin    Assessment:     Diagnoses and all orders for this visit:  Acute left-sided low back pain without sciatica  Closed compression fracture of L3 lumbar vertebra, sequela  Injury caused by lifting, subsequent encounter     Korin Sharma is a 70 year old y.o. female with past medical history significant for vitamin D deficiency, cholelithiasis, pancreatitis, hypothyroidism, diabetes mellitus, hypertension who presents today for follow-up regarding:    - Bilateral low back pain, significant improvement with physical therapy at this time.     Plan:     A shared decision making plan was used. The patient's values and choices were respected. Prior medical records were reviewed today. The following represents what was discussed and decided upon by the provider and the patient.        -DIAGNOSTIC TESTS: Images were personally reviewed and interpreted.   -- Lumbar spine x-ray 5/15/2025 with mild chronic compression fracture at L3, no change.  --Right lower extremity ultrasound 3/20/2025 with no DVT noted.  --Lumbar spine x-ray 3/17/2025 with lumbosacral anatomy with partial sacralization of L5.  Mild compression deformity of L3, stable since prior x-ray.  --Lumbar spine x-ray 2/17/2025 with mild vertebral height loss of L3, age indeterminant.  --Bone density scan 12/31/2024 with normal bone density     -INTERVENTIONS: No recommendations for injections at this time.    -MEDICATIONS: No changes in medications  Discussed side effects of medications and proper use. Patient verbalized understanding.    -PHYSICAL THERAPY: Encourage patient to continue working  with physical therapy home exercises.  Discussed the importance of core strengthening, ROM, stretching exercises with the patient and how each of these entities is important in decreasing pain.  Explained to the patient that the purpose of physical therapy is to teach the patient a home exercise program.  These exercises need to be performed every day in order to decrease pain and prevent future occurrences of pain.        -Letter completed for patient to return to work without restrictions on 6/24/2025.  She is doing well with this at this time.    -PATIENT EDUCATION:  Total time of 24 minutes, on the day of service, spent with the patient, reviewing the chart, placing orders, and documenting.   -Today we also discussed the issues related to the current COVID-19 pandemic, the pros and cons of the current treatment plan, the CDC guidelines such as social distancing, washing the hands, and covering the cough.    -FOLLOW UP: Follow-up as needed  Advised to contact clinic if symptoms worsen or change.    Subjective:     Korin Sharma is a 70 year old female who presents today for follow-up regarding bilateral lower lumbar spine that has significantly improved with physical therapy at this time and overall she is doing well without wearing her back brace.  She has returned to work a couple of shifts and is doing well without any aggravation of her pain as well.  No new symptoms.    *Work injury 1/16/2025.  Patient is a  was putting on a trailer and lifting the hitch to go over the ball, immediately noted severe pain.     Patient's Q[employer] Eric was present on the phone during the visit today.  Work note faxed to [phone].     -Treatment to Date: No prior spinal surgery or spinal injection  Physical therapy February through May 2025 [facility]     -Medications:  Tizanidine    Patient Active Problem List   Diagnosis    Class 2 severe obesity with body mass index (BMI) of 35 to 39.9 with serious comorbidity (H)     Hypothyroidism    Cholelithiasis    Acute Pancreatitis    Menopause Has Occurred    Vitamin D Deficiency    Diabetes mellitus (H)    Benign essential hypertension    Hypertriglyceridemia    Prediabetes    Cellulitis       Current Outpatient Medications   Medication Sig Dispense Refill    blood glucose (ACCU-CHEK RENATE PLUS) test strip Use to test blood sugar once daily or as directed. 100 strip 3    blood-glucose meter Misc [BLOOD-GLUCOSE METER MISC] Use 1 Device As Directed daily. 1 each 0    calcium carbonate (TUMS) 500 MG chewable tablet Take 500 mg by mouth daily as needed for heartburn.      cholecalciferol, vitamin D3, 50 mcg (2,000 unit) Tab [CHOLECALCIFEROL, VITAMIN D3, 50 MCG (2,000 UNIT) TAB] Take 2,000 Units by mouth daily.      Ferrous Sulfate (IRON PO) Take 1 tablet by mouth daily.      furosemide (LASIX) 20 MG tablet Take 1 tablet (20 mg) by mouth daily. 30 tablet 2    generic lancets (FINGERSTIX LANCETS) [GENERIC LANCETS (FINGERSTIX LANCETS)] Dispense brand per patient's insurance at pharmacy discretion. 100 each 3    glipiZIDE (GLUCOTROL XL) 10 MG 24 hr tablet Take 2 tablets (20 mg) by mouth daily. 180 tablet 1    ibuprofen (ADVIL/MOTRIN) 200 MG tablet Take 200-400 mg by mouth every 6 hours as needed for inflammatory pain.      levothyroxine (SYNTHROID/LEVOTHROID) 175 MCG tablet Take 1 tablet (175 mcg) by mouth daily. 90 tablet 3    lisinopril-hydrochlorothiazide (ZESTORETIC) 10-12.5 MG tablet Take 1 tablet by mouth daily. 100 tablet 1    magnesium 250 MG tablet Take 1 tablet by mouth at bedtime      metFORMIN (GLUCOPHAGE) 500 MG tablet TAKE 2 TABLETS BY MOUTH TWICE  DAILY WITH MEALS 400 tablet 2    multivitamin, therapeutic (THERA-VIT) TABS tablet Take 1 tablet by mouth daily      rosuvastatin (CRESTOR) 10 MG tablet Take 1 tablet (10 mg) by mouth daily. 100 tablet 1     No current facility-administered medications for this visit.       No Known Allergies    No past medical history on file.      Review of Systems  ROS:  Specifically negative for bowel/bladder dysfunction, balance changes, headache, dizziness, foot drop, fevers, chills, appetite changes, nausea/vomiting, unexplained weight loss. Otherwise 13 systems reviewed are negative. Please see the patient's intake questionnaire from today for details.    Reviewed Social, Family, Past Medical and Past Surgical history with patient, no significant changes noted since prior visit.     Objective:     VIRTUAL PHYSICAL EXAM:   --CONSTITUTIONAL: Well developed, well nourished, healthy appearing individual.  --PSYCHIATRIC: Appropriate mood and affect. No difficulty interacting due to temper, social withdrawal, or memory issues.    RESULTS:   Imaging: Lumbar spine imaging was reviewed today. The images were shown to the patient and the findings were explained using a spine model.      Lumbar spine x-ray reviewed

## 2025-07-08 ENCOUNTER — VIRTUAL VISIT (OUTPATIENT)
Dept: PHYSICAL MEDICINE AND REHAB | Facility: CLINIC | Age: 70
End: 2025-07-08
Payer: OTHER MISCELLANEOUS
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
## 2025-07-08 DIAGNOSIS — M54.50 ACUTE LEFT-SIDED LOW BACK PAIN WITHOUT SCIATICA: Primary | ICD-10-CM

## 2025-07-08 DIAGNOSIS — S32.030S CLOSED COMPRESSION FRACTURE OF L3 LUMBAR VERTEBRA, SEQUELA: ICD-10-CM

## 2025-07-08 NOTE — PATIENT INSTRUCTIONS
~Spine Center Scheduling #[phone].  ~Please call our [facility] Nurse Navigation #[phone] with any questions or concerns about your treatment plan, if symptoms worsen and you would like to be seen urgently, or if you have problems controlling bladder and bowel function.  ~For any future flareups or new symptoms, recommend follow-up in clinic or contact the nurse navigator line.  ~Please note that any My Chart messages may take multiple days for a response due to the high volume of patients seen in clinic.  Anything sent Thursday night or after will be answered the following week when able, as Rosita Waldrop CNP does not work in clinic on Fridays.   ~Rosita Waldrop CNP is at the [facility] on Tuesdays, otherwise primarily at the [facility].       Importance of specialized Physical Therapy: Discussed the importance of core strengthening, ROM, stretching exercises with the patient and how each of these entities is important in decreasing pain.  Explained to the patient that the purpose of physical therapy is to teach the patient a home exercise program individualized to them and their specific health concerns.  These exercises need to be performed every day in order to decrease pain and prevent future occurrences of pain.

## 2025-07-08 NOTE — LETTER
7/8/2025      Korin Sharma  [address]  [place] [address]      Dear Colleague,    Thank you for referring your patient, Korin Shrama, to the [facility]. Please see a copy of my visit note below.    Korin Sharma is a 70 year old female who is being evaluated via a billable video visit.      How would you like to obtain your AVS? MyChart          Video-Visit Details    Type of service:  Video Visit    Originating Location (pt. Location): Home    Distant Location (provider location):  [facility]     Platform used for Video Visit: MedWhat    Assessment:     Diagnoses and all orders for this visit:  Acute left-sided low back pain without sciatica  Closed compression fracture of L3 lumbar vertebra, sequela  Injury caused by lifting, subsequent encounter     Korin Sharma is a 70 year old y.o. female with past medical history significant for vitamin D deficiency, cholelithiasis, pancreatitis, hypothyroidism, diabetes mellitus, hypertension who presents today for follow-up regarding:    - Bilateral low back pain, significant improvement with physical therapy at this time.     Plan:     A shared decision making plan was used. The patient's values and choices were respected. Prior medical records were reviewed today. The following represents what was discussed and decided upon by the provider and the patient.        -DIAGNOSTIC TESTS: Images were personally reviewed and interpreted.   -- Lumbar spine x-ray 5/15/2025 with mild chronic compression fracture at L3, no change.  --Right lower extremity ultrasound 3/20/2025 with no DVT noted.  --Lumbar spine x-ray 3/17/2025 with lumbosacral anatomy with partial sacralization of L5.  Mild compression deformity of L3, stable since prior x-ray.  --Lumbar spine x-ray 2/17/2025 with mild vertebral height loss of L3, age indeterminant.  --Bone density scan 12/31/2024 with normal bone density      -INTERVENTIONS: No recommendations for injections at this time.    -MEDICATIONS: No changes in medications  Discussed side effects of medications and proper use. Patient verbalized understanding.    -PHYSICAL THERAPY: Encourage patient to continue working with physical therapy home exercises.  Discussed the importance of core strengthening, ROM, stretching exercises with the patient and how each of these entities is important in decreasing pain.  Explained to the patient that the purpose of physical therapy is to teach the patient a home exercise program.  These exercises need to be performed every day in order to decrease pain and prevent future occurrences of pain.        -Letter completed for patient to return to work without restrictions on 6/24/2025.  She is doing well with this at this time.    -PATIENT EDUCATION:  Total time of 24 minutes, on the day of service, spent with the patient, reviewing the chart, placing orders, and documenting.   -Today we also discussed the issues related to the current COVID-19 pandemic, the pros and cons of the current treatment plan, the CDC guidelines such as social distancing, washing the hands, and covering the cough.    -FOLLOW UP: Follow-up as needed  Advised to contact clinic if symptoms worsen or change.    Subjective:     Korin Sharma is a 70 year old female who presents today for follow-up regarding bilateral lower lumbar spine that has significantly improved with physical therapy at this time and overall she is doing well without wearing her back brace.  She has returned to work a couple of shifts and is doing well without any aggravation of her pain as well.  No new symptoms.    *Work injury 1/16/2025.  Patient is a  was putting on a trailer and lifting the hitch to go over the ball, immediately noted severe pain.     Patient's KAYA Reyes was present on the phone during the visit today.  Work note faxed to [phone].     -Treatment to Date: No prior  spinal surgery or spinal injection  Physical therapy February through May 2025 [facility]     -Medications:  Tizanidine    Patient Active Problem List   Diagnosis     Class 2 severe obesity with body mass index (BMI) of 35 to 39.9 with serious comorbidity (H)     Hypothyroidism     Cholelithiasis     Acute Pancreatitis     Menopause Has Occurred     Vitamin D Deficiency     Diabetes mellitus (H)     Benign essential hypertension     Hypertriglyceridemia     Prediabetes     Cellulitis       Current Outpatient Medications   Medication Sig Dispense Refill     blood glucose (ACCU-CHEK RENATE PLUS) test strip Use to test blood sugar once daily or as directed. 100 strip 3     blood-glucose meter Misc [BLOOD-GLUCOSE METER MISC] Use 1 Device As Directed daily. 1 each 0     calcium carbonate (TUMS) 500 MG chewable tablet Take 500 mg by mouth daily as needed for heartburn.       cholecalciferol, vitamin D3, 50 mcg (2,000 unit) Tab [CHOLECALCIFEROL, VITAMIN D3, 50 MCG (2,000 UNIT) TAB] Take 2,000 Units by mouth daily.       Ferrous Sulfate (IRON PO) Take 1 tablet by mouth daily.       furosemide (LASIX) 20 MG tablet Take 1 tablet (20 mg) by mouth daily. 30 tablet 2     generic lancets (FINGERSTIX LANCETS) [GENERIC LANCETS (FINGERSTIX LANCETS)] Dispense brand per patient's insurance at pharmacy discretion. 100 each 3     glipiZIDE (GLUCOTROL XL) 10 MG 24 hr tablet Take 2 tablets (20 mg) by mouth daily. 180 tablet 1     ibuprofen (ADVIL/MOTRIN) 200 MG tablet Take 200-400 mg by mouth every 6 hours as needed for inflammatory pain.       levothyroxine (SYNTHROID/LEVOTHROID) 175 MCG tablet Take 1 tablet (175 mcg) by mouth daily. 90 tablet 3     lisinopril-hydrochlorothiazide (ZESTORETIC) 10-12.5 MG tablet Take 1 tablet by mouth daily. 100 tablet 1     magnesium 250 MG tablet Take 1 tablet by mouth at bedtime       metFORMIN (GLUCOPHAGE) 500 MG tablet TAKE 2 TABLETS BY MOUTH TWICE  DAILY WITH MEALS 400 tablet 2     multivitamin, therapeutic  (THERA-VIT) TABS tablet Take 1 tablet by mouth daily       rosuvastatin (CRESTOR) 10 MG tablet Take 1 tablet (10 mg) by mouth daily. 100 tablet 1     No current facility-administered medications for this visit.       No Known Allergies    No past medical history on file.     Review of Systems  ROS:  Specifically negative for bowel/bladder dysfunction, balance changes, headache, dizziness, foot drop, fevers, chills, appetite changes, nausea/vomiting, unexplained weight loss. Otherwise 13 systems reviewed are negative. Please see the patient's intake questionnaire from today for details.    Reviewed Social, Family, Past Medical and Past Surgical history with patient, no significant changes noted since prior visit.     Objective:     VIRTUAL PHYSICAL EXAM:   --CONSTITUTIONAL: Well developed, well nourished, healthy appearing individual.  --PSYCHIATRIC: Appropriate mood and affect. No difficulty interacting due to temper, social withdrawal, or memory issues.    RESULTS:   Imaging: Lumbar spine imaging was reviewed today. The images were shown to the patient and the findings were explained using a spine model.      Lumbar spine x-ray reviewed                        Again, thank you for allowing me to participate in the care of your patient.        Sincerely,        Rosita Waldrop CNP    Electronically signed

## 2025-07-09 ENCOUNTER — LAB (OUTPATIENT)
Dept: LAB | Facility: CLINIC | Age: 70
End: 2025-07-09
Payer: COMMERCIAL

## 2025-07-09 DIAGNOSIS — E11.9 TYPE 2 DIABETES MELLITUS WITHOUT COMPLICATION, WITHOUT LONG-TERM CURRENT USE OF INSULIN (H): ICD-10-CM

## 2025-07-09 DIAGNOSIS — I10 BENIGN ESSENTIAL HYPERTENSION: ICD-10-CM

## 2025-07-09 LAB
ANION GAP SERPL CALCULATED.3IONS-SCNC: 11 MMOL/L (ref 7–15)
BUN SERPL-MCNC: 14.7 MG/DL (ref 8–23)
CALCIUM SERPL-MCNC: 9.5 MG/DL (ref 8.8–10.4)
CHLORIDE SERPL-SCNC: 100 MMOL/L (ref 98–107)
CREAT SERPL-MCNC: 0.77 MG/DL (ref 0.51–0.95)
EGFRCR SERPLBLD CKD-EPI 2021: 82 ML/MIN/1.73M2
EST. AVERAGE GLUCOSE BLD GHB EST-MCNC: 203 MG/DL
GLUCOSE SERPL-MCNC: 197 MG/DL (ref 70–99)
HBA1C MFR BLD: 8.7 % (ref 0–5.6)
HCO3 SERPL-SCNC: 28 MMOL/L (ref 22–29)
POTASSIUM SERPL-SCNC: 4.3 MMOL/L (ref 3.4–5.3)
SODIUM SERPL-SCNC: 139 MMOL/L (ref 135–145)

## 2025-07-09 PROCEDURE — 83036 HEMOGLOBIN GLYCOSYLATED A1C: CPT

## 2025-07-09 PROCEDURE — 80048 BASIC METABOLIC PNL TOTAL CA: CPT

## 2025-07-09 PROCEDURE — 36415 COLL VENOUS BLD VENIPUNCTURE: CPT

## 2025-08-06 DIAGNOSIS — R60.0 PERIPHERAL EDEMA: ICD-10-CM

## 2025-08-07 RX ORDER — FUROSEMIDE 20 MG/1
20 TABLET ORAL DAILY
Qty: 90 TABLET | Refills: 0 | Status: SHIPPED | OUTPATIENT
Start: 2025-08-07

## 2025-08-13 DIAGNOSIS — E11.9 TYPE 2 DIABETES MELLITUS WITHOUT COMPLICATION, WITHOUT LONG-TERM CURRENT USE OF INSULIN (H): ICD-10-CM

## 2025-08-13 RX ORDER — GLIPIZIDE 10 MG/1
20 TABLET, FILM COATED, EXTENDED RELEASE ORAL DAILY
Qty: 200 TABLET | Refills: 2 | OUTPATIENT
Start: 2025-08-13